# Patient Record
Sex: FEMALE | Race: WHITE | NOT HISPANIC OR LATINO | Employment: FULL TIME | URBAN - METROPOLITAN AREA
[De-identification: names, ages, dates, MRNs, and addresses within clinical notes are randomized per-mention and may not be internally consistent; named-entity substitution may affect disease eponyms.]

---

## 2018-02-04 ENCOUNTER — HOSPITAL ENCOUNTER (EMERGENCY)
Facility: HOSPITAL | Age: 48
Discharge: HOME | End: 2018-02-04
Attending: EMERGENCY MEDICINE | Admitting: EMERGENCY MEDICINE

## 2018-02-04 ENCOUNTER — APPOINTMENT (OUTPATIENT)
Dept: GENERAL RADIOLOGY | Facility: HOSPITAL | Age: 48
End: 2018-02-04

## 2018-02-04 VITALS
HEART RATE: 64 BPM | OXYGEN SATURATION: 97 % | WEIGHT: 200 LBS | TEMPERATURE: 98.6 F | DIASTOLIC BLOOD PRESSURE: 77 MMHG | BODY MASS INDEX: 32.14 KG/M2 | HEIGHT: 66 IN | SYSTOLIC BLOOD PRESSURE: 99 MMHG | RESPIRATION RATE: 16 BRPM

## 2018-02-04 DIAGNOSIS — R07.89 ATYPICAL CHEST PAIN: Primary | ICD-10-CM

## 2018-02-04 LAB
ALBUMIN SERPL-MCNC: 3.8 G/DL (ref 3.5–5.2)
ALBUMIN/GLOB SERPL: 1.1 G/DL
ALP SERPL-CCNC: 97 U/L (ref 39–117)
ALT SERPL W P-5'-P-CCNC: 28 U/L (ref 1–33)
ANION GAP SERPL CALCULATED.3IONS-SCNC: 12.2 MMOL/L
AST SERPL-CCNC: 24 U/L (ref 1–32)
BASOPHILS # BLD AUTO: 0.02 10*3/MM3 (ref 0–0.2)
BASOPHILS NFR BLD AUTO: 0.2 % (ref 0–1.5)
BILIRUB SERPL-MCNC: 0.3 MG/DL (ref 0.1–1.2)
BUN BLD-MCNC: 14 MG/DL (ref 6–20)
BUN/CREAT SERPL: 14.4 (ref 7–25)
CALCIUM SPEC-SCNC: 9.2 MG/DL (ref 8.6–10.5)
CHLORIDE SERPL-SCNC: 106 MMOL/L (ref 98–107)
CO2 SERPL-SCNC: 23.8 MMOL/L (ref 22–29)
CREAT BLD-MCNC: 0.97 MG/DL (ref 0.57–1)
DEPRECATED RDW RBC AUTO: 41.2 FL (ref 37–54)
EOSINOPHIL # BLD AUTO: 0.19 10*3/MM3 (ref 0–0.7)
EOSINOPHIL NFR BLD AUTO: 2.1 % (ref 0.3–6.2)
ERYTHROCYTE [DISTWIDTH] IN BLOOD BY AUTOMATED COUNT: 12.8 % (ref 11.7–13)
GFR SERPL CREATININE-BSD FRML MDRD: 61 ML/MIN/1.73
GLOBULIN UR ELPH-MCNC: 3.4 GM/DL
GLUCOSE BLD-MCNC: 82 MG/DL (ref 65–99)
HCT VFR BLD AUTO: 41.7 % (ref 35.6–45.5)
HGB BLD-MCNC: 14 G/DL (ref 11.9–15.5)
IMM GRANULOCYTES # BLD: 0.08 10*3/MM3 (ref 0–0.03)
IMM GRANULOCYTES NFR BLD: 0.9 % (ref 0–0.5)
LYMPHOCYTES # BLD AUTO: 2.06 10*3/MM3 (ref 0.9–4.8)
LYMPHOCYTES NFR BLD AUTO: 22.4 % (ref 19.6–45.3)
MCH RBC QN AUTO: 29.7 PG (ref 26.9–32)
MCHC RBC AUTO-ENTMCNC: 33.6 G/DL (ref 32.4–36.3)
MCV RBC AUTO: 88.5 FL (ref 80.5–98.2)
MONOCYTES # BLD AUTO: 0.92 10*3/MM3 (ref 0.2–1.2)
MONOCYTES NFR BLD AUTO: 10 % (ref 5–12)
NEUTROPHILS # BLD AUTO: 5.91 10*3/MM3 (ref 1.9–8.1)
NEUTROPHILS NFR BLD AUTO: 64.4 % (ref 42.7–76)
PLATELET # BLD AUTO: 196 10*3/MM3 (ref 140–500)
PMV BLD AUTO: 10.5 FL (ref 6–12)
POTASSIUM BLD-SCNC: 4.2 MMOL/L (ref 3.5–5.2)
PROT SERPL-MCNC: 7.2 G/DL (ref 6–8.5)
RBC # BLD AUTO: 4.71 10*6/MM3 (ref 3.9–5.2)
SODIUM BLD-SCNC: 142 MMOL/L (ref 136–145)
TROPONIN T SERPL-MCNC: <0.01 NG/ML (ref 0–0.03)
WBC NRBC COR # BLD: 9.18 10*3/MM3 (ref 4.5–10.7)

## 2018-02-04 PROCEDURE — 93005 ELECTROCARDIOGRAM TRACING: CPT

## 2018-02-04 PROCEDURE — 93010 ELECTROCARDIOGRAM REPORT: CPT | Performed by: INTERNAL MEDICINE

## 2018-02-04 PROCEDURE — 84484 ASSAY OF TROPONIN QUANT: CPT | Performed by: NURSE PRACTITIONER

## 2018-02-04 PROCEDURE — 99285 EMERGENCY DEPT VISIT HI MDM: CPT

## 2018-02-04 PROCEDURE — 80053 COMPREHEN METABOLIC PANEL: CPT | Performed by: NURSE PRACTITIONER

## 2018-02-04 PROCEDURE — 85025 COMPLETE CBC W/AUTO DIFF WBC: CPT | Performed by: NURSE PRACTITIONER

## 2018-02-04 PROCEDURE — 71046 X-RAY EXAM CHEST 2 VIEWS: CPT

## 2018-02-04 RX ORDER — NAPROXEN SODIUM 550 MG/1
550 TABLET ORAL 2 TIMES DAILY PRN
COMMUNITY
End: 2019-07-21

## 2018-02-04 RX ORDER — CITALOPRAM 40 MG/1
60 TABLET ORAL DAILY
COMMUNITY
End: 2022-03-23 | Stop reason: ALTCHOICE

## 2018-02-04 RX ORDER — LORAZEPAM 0.5 MG/1
0.5 TABLET ORAL EVERY 8 HOURS PRN
COMMUNITY
End: 2021-12-13 | Stop reason: HOSPADM

## 2018-02-04 RX ORDER — NITROGLYCERIN 0.4 MG/1
0.4 TABLET SUBLINGUAL ONCE
Status: COMPLETED | OUTPATIENT
Start: 2018-02-04 | End: 2018-02-04

## 2018-02-04 RX ORDER — SUMATRIPTAN 6 MG/.5ML
6 INJECTION, SOLUTION SUBCUTANEOUS ONCE AS NEEDED
COMMUNITY
End: 2021-12-13 | Stop reason: HOSPADM

## 2018-02-04 RX ADMIN — NITROGLYCERIN 0.4 MG: 0.4 TABLET SUBLINGUAL at 15:12

## 2018-02-04 NOTE — ED PROVIDER NOTES
"  EMERGENCY DEPARTMENT ENCOUNTER    CHIEF COMPLAINT  Chief Complaint: chest pain  History given by: patient, family  History limited by: N/A  Room Number: 03/03  PMD: SAMI Villegas      HPI:  Pt is a 47 y.o. female who has hx of atrial fibrillation (on 81mg ASA and metoprolol). She presents with nonradiating central chest pain (described as a \"kick\") that started today after she rolled over on the couch. It has no aggravating factors and no alleviating factors. She has also had anxiety. She denies nausea, vomiting, sweating, trouble breathing, palpitations, BLE swelling, fevers, chills, and abd pain. She reports that because her sx were similar to previous episodes of atrial fibrillation, she took metoprolol; however, it did not provide significant sx relief. Currently, sx have improved. Past Medical History of anxiety with panic attacks, atrial fibrillation (on metoprolol, 81mg ASA), and HTN.     Duration: started today  Timing: constant  Location: central chest  Radiation: none  Quality: \"kick\"  Intensity/Severity: moderate  Progression: improved  Associated Symptoms: anxiety  Aggravating Factors: none  Alleviating Factors: none  Previous Episodes: Pt notes that she had similar sx in the past due to atrial fibrillation.   Treatment before arrival: Pt reports that because her sx were similar to previous episodes of atrial fibrillation, she took metoprolol; however, it did not provide significant sx relief.    PAST MEDICAL HISTORY  Active Ambulatory Problems     Diagnosis Date Noted   • No Active Ambulatory Problems     Resolved Ambulatory Problems     Diagnosis Date Noted   • No Resolved Ambulatory Problems     Past Medical History:   Diagnosis Date   • Anxiety    • Atrial fibrillation    • Hypertension    • Panic attacks        PAST SURGICAL HISTORY  History reviewed. No pertinent surgical history.    FAMILY HISTORY  History reviewed. No pertinent family history.    SOCIAL HISTORY  Social History     Social " History   • Marital status:      Spouse name: N/A   • Number of children: N/A   • Years of education: N/A     Occupational History   • Not on file.     Social History Main Topics   • Smoking status: Not on file   • Smokeless tobacco: Not on file   • Alcohol use Not on file   • Drug use: Not on file   • Sexual activity: Not on file     Other Topics Concern   • Not on file     Social History Narrative   • No narrative on file         ALLERGIES  Review of patient's allergies indicates no known allergies.    REVIEW OF SYSTEMS  Review of Systems   Constitutional: Negative for chills and fever.   HENT: Negative for sore throat.    Respiratory: Negative for cough and shortness of breath.    Cardiovascular: Positive for chest pain (central chest pain).   Gastrointestinal: Negative for abdominal pain, diarrhea, nausea and vomiting.   Genitourinary: Negative for difficulty urinating and dysuria.   Musculoskeletal: Negative for back pain.   Skin: Negative for rash.   Neurological: Negative for weakness and headaches.   Psychiatric/Behavioral: The patient is nervous/anxious.        PHYSICAL EXAM  ED Triage Vitals   Temp Heart Rate Resp BP SpO2   02/04/18 1348 02/04/18 1347 02/04/18 1347 02/04/18 1347 02/04/18 1347   98.6 °F (37 °C) 65 16 106/72 98 % WNL       Physical Exam   Constitutional: She is oriented to person, place, and time and well-developed, well-nourished, and in no distress.   HENT:   Head: Normocephalic.   Mouth/Throat: Mucous membranes are normal.   Eyes: EOM are normal. No scleral icterus.   Neck: Normal range of motion.   Cardiovascular: Normal rate, regular rhythm and normal heart sounds.    Pulmonary/Chest: Effort normal and breath sounds normal. No respiratory distress. She has no wheezes. She exhibits tenderness (tenderness to mid chest that reproduces chest pain).   Abdominal: Soft. There is no tenderness. There is no rebound and no guarding.   Musculoskeletal: Normal range of motion. She exhibits  no edema (no pedal edema).   Neurological: She is alert and oriented to person, place, and time. She has normal motor skills and normal sensation.   Skin: Skin is warm and dry.   Psychiatric: Mood and affect normal.   Nursing note and vitals reviewed.      LAB RESULTS  Recent Results (from the past 24 hour(s))   Comprehensive Metabolic Panel    Collection Time: 02/04/18  3:00 PM   Result Value Ref Range    Glucose 82 65 - 99 mg/dL    BUN 14 6 - 20 mg/dL    Creatinine 0.97 0.57 - 1.00 mg/dL    Sodium 142 136 - 145 mmol/L    Potassium 4.2 3.5 - 5.2 mmol/L    Chloride 106 98 - 107 mmol/L    CO2 23.8 22.0 - 29.0 mmol/L    Calcium 9.2 8.6 - 10.5 mg/dL    Total Protein 7.2 6.0 - 8.5 g/dL    Albumin 3.80 3.50 - 5.20 g/dL    ALT (SGPT) 28 1 - 33 U/L    AST (SGOT) 24 1 - 32 U/L    Alkaline Phosphatase 97 39 - 117 U/L    Total Bilirubin 0.3 0.1 - 1.2 mg/dL    eGFR Non African Amer 61 >60 mL/min/1.73    Globulin 3.4 gm/dL    A/G Ratio 1.1 g/dL    BUN/Creatinine Ratio 14.4 7.0 - 25.0    Anion Gap 12.2 mmol/L   Troponin    Collection Time: 02/04/18  3:00 PM   Result Value Ref Range    Troponin T <0.010 0.000 - 0.030 ng/mL   CBC Auto Differential    Collection Time: 02/04/18  3:00 PM   Result Value Ref Range    WBC 9.18 4.50 - 10.70 10*3/mm3    RBC 4.71 3.90 - 5.20 10*6/mm3    Hemoglobin 14.0 11.9 - 15.5 g/dL    Hematocrit 41.7 35.6 - 45.5 %    MCV 88.5 80.5 - 98.2 fL    MCH 29.7 26.9 - 32.0 pg    MCHC 33.6 32.4 - 36.3 g/dL    RDW 12.8 11.7 - 13.0 %    RDW-SD 41.2 37.0 - 54.0 fl    MPV 10.5 6.0 - 12.0 fL    Platelets 196 140 - 500 10*3/mm3    Neutrophil % 64.4 42.7 - 76.0 %    Lymphocyte % 22.4 19.6 - 45.3 %    Monocyte % 10.0 5.0 - 12.0 %    Eosinophil % 2.1 0.3 - 6.2 %    Basophil % 0.2 0.0 - 1.5 %    Immature Grans % 0.9 (H) 0.0 - 0.5 %    Neutrophils, Absolute 5.91 1.90 - 8.10 10*3/mm3    Lymphocytes, Absolute 2.06 0.90 - 4.80 10*3/mm3    Monocytes, Absolute 0.92 0.20 - 1.20 10*3/mm3    Eosinophils, Absolute 0.19 0.00 - 0.70  10*3/mm3    Basophils, Absolute 0.02 0.00 - 0.20 10*3/mm3    Immature Grans, Absolute 0.08 (H) 0.00 - 0.03 10*3/mm3       I ordered the above labs and reviewed the results      RADIOLOGY         XR Chest 2 View (Preliminary result) Result time: 02/04/18 16:10:31     Preliminary result by Interface, Rad Results [place] In (02/04/18 16:10:31)     Impression:     Negative.        Narrative:     PA AND LATERAL CHEST X-RAY     HISTORY: Atrial fibrillation. Chest pain.     Chest x-ray consisting of PA and lateral views is provided.  Comparison  exams: None     FINDINGS: The cardiomediastinal silhouette is normal. The lungs are  clear. The costophrenic sulci are dry and the bones appear normal. There  is no pneumothorax.          I ordered the above noted radiological studies and reviewed the images on the PACS system.        EKG    EKG x2 were interpreted by Dr. Hairston. See Dr Hairston's note for EKG interpretation.         PROGRESS AND CONSULTS  3:01 PM- Reviewed pt's history and workup with Dr. Hairston.  At bedside evaluation, they agree with the plan of care.  3:08 PM- Per RN, pt's chest pain has worsened. Ordered repeat EKG and NTG sublingual.   3:40 PM- Pt's chest pain remained unchanged with NTG sublingual. Repeat EKG is not changed compared to initial EKG (see Dr Hairston's note for EKG interpretation).   3:52 PM- Rechecked pt. She is resting comfortably and is in no acute distress. Reviewed implications of results (including negative troponin, CXR findings (no acute process), EKG findings), diagnosis, meds, responsibility to follow up, warning signs and symptoms of possible worsening, potential complications and reasons to return to ER with patient.  Discussed all results and noted any abnormalities with patient.  Discussed the importance of and the absolute need to follow up with PMD and referred cardiologist closely for recheck of abnormalities and condition and for further management. Strict RTER warnings  "given.   Discussed plan for discharge, as there is no emergent indication for admission.  Pt is agreeable and understands need for follow up and repeat testing.  Pt is aware that discharge does not mean that nothing is wrong but it indicates no emergency is present.  Pt is discharged with instructions to follow up with primary care doctor to have their blood pressure rechecked.       DIAGNOSIS  Final diagnoses:   Atypical chest pain       FOLLOW UP   Yoanna AYAH Mabel, APRN  [address]  [facility] [id number] [phone]    Call in 1 day      Estefany Humphries MD  [address]  [address]  [facility] [address]  [phone]    Call in 1 day        COURSE & MEDICAL DECISION MAKING  Pertinent Labs and Imaging studies that were ordered and reviewed are noted above.  Results were reviewed/discussed with the patient and they were also made aware of online assess.   Pt also made aware that some labs, such as cultures, will not be resulted during ER visit and follow up with PMD is necessary.     MEDICATIONS GIVEN IN ER  Medications   nitroglycerin (NITROSTAT) SL tablet 0.4 mg (0.4 mg Sublingual Given 2/4/18 1512)       /84  Pulse 62  Temp 98.6 °F (37 °C)  Resp 16  Ht 167.6 cm (66\")  Wt 90.7 kg (200 lb)  SpO2 95%  BMI 32.28 kg/m2      I personally reviewed the past medical history, past surgical history, social history, family history, current medications and allergies as they appear in this chart.  The scribe's note accurately reflects the work and decisions made by me.     Documentation assistance provided by vicki Mooney for JET Cruz on 2/4/2018 at 4:11 PM. Information recorded by the scribe was done at my direction and has been verified and validated by me.         Asif Mooney  02/04/18 1632       SAMI Burton  02/05/18 0650    "

## 2018-02-04 NOTE — ED PROVIDER NOTES
Pt presents to the ED presents to the ED complaining of chest pain.  Pt states that today's chest pain was preceded by dizziness. She reports a history of afib but has not seen a cardiologist since 2016.  Pt states that her mom had a stent placed in her 40s.      On exam Pt is alert, awake, and in no distress.      I reviewed available workup.  Spoke with Pt about her available results.  Discussed the plan to evaluate further with chest XR.  Will discharge if XR is normal. Pt understands and agrees with the plan, all questions answered.    EKG           EKG time: 1437  Rhythm/Rate: Sinus, 64  P waves and MS: Normal  QRS, axis: LAD   ST and T waves: Non specific ST and T changes     Interpreted Contemporaneously by me, independently viewed  No priors for comparison.     EKG           EKG time: 1508  Rhythm/Rate: Sinus, 64  P waves and MS: Normal  QRS, axis: Normal   ST and T waves: Non specific ST and T wave changes.      Interpreted Contemporaneously by me, independently viewed  Unchanged compared to prior performed at 1437       I supervised care provided by the midlevel provider.    We have discussed this patient's history, physical exam, and treatment plan.   I have reviewed the note and personally saw and examined the patient and agree with the plan of care.    Documentation assistance provided by vicki Holm for Dr. Hairsotn.  Information recorded by the vicki was done at my direction and has been verified and validated by me.       Rosi Holm  02/04/18 1550       Rosi Holm  02/04/18 1621       Lai Hairston MD  02/04/18 5543

## 2018-02-04 NOTE — DISCHARGE INSTRUCTIONS
Continue current home medications  Follow up with pmd and cardiologist-call Monday for appointment  Return to er for fever, chills, vomiting, diarrhea, chest pain, shortness of air, or any new or worsening symptoms

## 2018-03-03 ENCOUNTER — HOSPITAL ENCOUNTER (EMERGENCY)
Facility: HOSPITAL | Age: 48
Discharge: HOME | End: 2018-03-03
Attending: EMERGENCY MEDICINE | Admitting: EMERGENCY MEDICINE

## 2018-03-03 ENCOUNTER — APPOINTMENT (OUTPATIENT)
Dept: GENERAL RADIOLOGY | Facility: HOSPITAL | Age: 48
End: 2018-03-03

## 2018-03-03 VITALS
HEART RATE: 78 BPM | RESPIRATION RATE: 18 BRPM | HEIGHT: 62 IN | DIASTOLIC BLOOD PRESSURE: 78 MMHG | WEIGHT: 163 LBS | TEMPERATURE: 98.2 F | OXYGEN SATURATION: 98 % | SYSTOLIC BLOOD PRESSURE: 114 MMHG | BODY MASS INDEX: 30 KG/M2

## 2018-03-03 DIAGNOSIS — S93.601A SPRAIN OF RIGHT FOOT, INITIAL ENCOUNTER: ICD-10-CM

## 2018-03-03 DIAGNOSIS — S93.401A SPRAIN OF RIGHT ANKLE, UNSPECIFIED LIGAMENT, INITIAL ENCOUNTER: Primary | ICD-10-CM

## 2018-03-03 PROCEDURE — 73630 X-RAY EXAM OF FOOT: CPT

## 2018-03-03 PROCEDURE — 99283 EMERGENCY DEPT VISIT LOW MDM: CPT

## 2018-03-03 PROCEDURE — 73610 X-RAY EXAM OF ANKLE: CPT

## 2018-03-03 RX ORDER — NAPROXEN 500 MG/1
500 TABLET ORAL 2 TIMES DAILY WITH MEALS
Qty: 20 TABLET | Refills: 0 | OUTPATIENT
Start: 2018-03-03 | End: 2019-07-21

## 2018-03-04 NOTE — ED NOTES
Pt c/o right anterior ankle pain that started apprpox 1hr ago. Pt states that she was walking her dog in the dark while texting when she missed at step and landed hard on her right foot. Minimal swelling noted. No deformity. Pedal pulse strong. Pt able to bear weight     Jayla Ann RN  03/03/18 4192

## 2018-03-04 NOTE — ED NOTES
Pt reports she was walking and texting and tripped and hurt her right ankle. Pt able to bear some wt     David Sapp RN  03/03/18 4565

## 2018-04-04 ENCOUNTER — HOSPITAL ENCOUNTER (EMERGENCY)
Facility: HOSPITAL | Age: 48
Discharge: HOME | End: 2018-04-04
Attending: EMERGENCY MEDICINE | Admitting: EMERGENCY MEDICINE

## 2018-04-04 VITALS
SYSTOLIC BLOOD PRESSURE: 126 MMHG | DIASTOLIC BLOOD PRESSURE: 72 MMHG | OXYGEN SATURATION: 98 % | HEART RATE: 87 BPM | RESPIRATION RATE: 18 BRPM | TEMPERATURE: 97.8 F | HEIGHT: 62 IN | BODY MASS INDEX: 30 KG/M2 | WEIGHT: 163 LBS

## 2018-04-04 DIAGNOSIS — M72.2 PLANTAR FASCIA SYNDROME: Primary | ICD-10-CM

## 2018-04-04 PROCEDURE — 99283 EMERGENCY DEPT VISIT LOW MDM: CPT

## 2018-04-04 RX ORDER — HYDROCODONE BITARTRATE AND ACETAMINOPHEN 5; 325 MG/1; MG/1
1 TABLET ORAL EVERY 6 HOURS PRN
Qty: 12 TABLET | Refills: 0 | Status: SHIPPED | OUTPATIENT
Start: 2018-04-04 | End: 2021-12-13 | Stop reason: HOSPADM

## 2018-04-04 RX ORDER — IBUPROFEN 800 MG/1
800 TABLET ORAL EVERY 8 HOURS PRN
Qty: 30 TABLET | Refills: 0 | OUTPATIENT
Start: 2018-04-04 | End: 2019-07-21

## 2018-04-04 NOTE — ED NOTES
Pt c/o left ankle pain that radiates up to her knee that started after she rolled her ankle 1 month ago. Pt states the pain has gotten worse.      Emma Bergman RN  04/04/18 6583

## 2018-04-04 NOTE — ED PROVIDER NOTES
EMERGENCY DEPARTMENT ENCOUNTER    CHIEF COMPLAINT  Chief Complaint: Ankle pain  History given by: pt  History limited by: nothing  Room Number: 50/50  PMD: Yoanna Monroe, SAMI      HPI:  Pt is a 47 y.o. female who presents complaining of L ankle pain onset 1 month ago. She feels pain today radiating up to her knee when she walks. Pt had a boot on her L leg, and thinks she twisted her ankle when she had this.     Duration:  1 month  Onset: gradual  Timing: constant  Location: L ankle  Radiation: L leg to knee  Quality: dull  Intensity/Severity: moderate  Progression: worsening  Associated Symptoms: none  Aggravating Factors: none  Alleviating Factors: none  Previous Episodes: Pt had an injury on her R leg prior to this one  Treatment before arrival: No tx before arrival noted.    PAST MEDICAL HISTORY  Active Ambulatory Problems     Diagnosis Date Noted   • No Active Ambulatory Problems     Resolved Ambulatory Problems     Diagnosis Date Noted   • No Resolved Ambulatory Problems     Past Medical History:   Diagnosis Date   • Anxiety    • Atrial fibrillation    • Hypertension    • Panic attacks        PAST SURGICAL HISTORY  History reviewed. No pertinent surgical history.    FAMILY HISTORY  History reviewed. No pertinent family history.    SOCIAL HISTORY  Social History     Social History   • Marital status:      Spouse name: N/A   • Number of children: N/A   • Years of education: N/A     Occupational History   • Not on file.     Social History Main Topics   • Smoking status: Never Smoker   • Smokeless tobacco: Not on file   • Alcohol use No   • Drug use: No   • Sexual activity: Defer     Other Topics Concern   • Not on file     Social History Narrative   • No narrative on file       ALLERGIES  Review of patient's allergies indicates no known allergies.    REVIEW OF SYSTEMS  Review of Systems   Constitutional: Negative for fever.   HENT: Negative for sore throat.    Eyes: Negative.    Respiratory: Negative  for cough and shortness of breath.    Cardiovascular: Negative for chest pain.   Gastrointestinal: Negative for abdominal pain, diarrhea and vomiting.   Genitourinary: Negative for dysuria.   Musculoskeletal: Positive for arthralgias (L foot). Negative for neck pain.   Skin: Negative for rash.   Allergic/Immunologic: Negative.    Neurological: Negative for weakness, numbness and headaches.   Hematological: Negative.    Psychiatric/Behavioral: Negative.    All other systems reviewed and are negative.      PHYSICAL EXAM  ED Triage Vitals   Temp Heart Rate Resp BP SpO2   04/04/18 1154 04/04/18 1154 04/04/18 1154 04/04/18 1221 04/04/18 1154   97.8 °F (36.6 °C) 88 16 115/78 98 %      Temp src Heart Rate Source Patient Position BP Location FiO2 (%)   04/04/18 1154 -- -- -- --   Tympanic           Physical Exam   Constitutional: She is oriented to person, place, and time and well-developed, well-nourished, and in no distress.   Eyes: EOM are normal.   Neck: Normal range of motion.   Cardiovascular: Normal rate and regular rhythm.    Pulmonary/Chest: Effort normal and breath sounds normal. No respiratory distress.   Musculoskeletal:   L foot- tenderness to palpation and plantar fascitis, pain with ROM, no swelling    Neurological: She is alert and oriented to person, place, and time. She has normal sensation and normal strength.   Skin: Skin is warm and dry.   Psychiatric: Affect normal.   Nursing note and vitals reviewed.      PROCEDURES  Procedures      PROGRESS AND CONSULTS  ED Course     1229- I advised pt that she has plantar fascitis. We will plan to provide her with steroids to decrease inflammation and provide her with a boot to decrease pressure on the area. We will have her f/u with a podiatrist. She will then be discharged. Pt understands and agrees with the plan, all questions answered.    MEDICAL DECISION MAKING  Results were reviewed/discussed with the patient and they were also made aware of online access. Pt  also made aware that some labs, such as cultures, will not be resulted during ER visit and follow up with PMD is necessary.     MDM       DIAGNOSIS  Final diagnoses:   Plantar fascia syndrome       DISPOSITION  DISCHARGE    Patient discharged in stable condition.    Reviewed implications of results, diagnosis, meds, responsibility to follow up, warning signs and symptoms of possible worsening, potential complications and reasons to return to ER, including worsening of sx.    Patient/Family voiced understanding of above instructions.    Discussed plan for discharge, as there is no emergent indication for admission. Patient referred to primary care provider for BP management due to today's BP. Pt/family is agreeable and understands need for follow up and repeat testing.  Pt is aware that discharge does not mean that nothing is wrong but it indicates no emergency is present that requires admission and they must continue care with follow-up as given below or physician of their choice.     FOLLOW-UP  Yoanna Monroe, APRN  [address] [facility] [address] [phone]    Call            Medication List      New Prescriptions    HYDROcodone-acetaminophen 5-325 MG per tablet  Commonly known as:  NORCO  Take 1 tablet by mouth Every 6 (Six) Hours As Needed for Severe Pain .     ibuprofen 800 MG tablet  Commonly known as:  ADVIL,MOTRIN  Take 1 tablet by mouth Every 8 (Eight) Hours As Needed for Mild Pain .              Latest Documented Vital Signs:  As of 12:42 PM  BP- 115/78 HR- 88 Temp- 97.8 °F (36.6 °C) (Tympanic) O2 sat- 98%    --  Documentation assistance provided by vicki Medellin for Dr. Smith.  Information recorded by the scrpoojae was done at my direction and has been verified and validated by me.     Chinmay Medellin  04/04/18 7624       Salvatore Smith MD  04/04/18 6485

## 2019-04-21 ENCOUNTER — HOSPITAL ENCOUNTER (EMERGENCY)
Facility: HOSPITAL | Age: 49
Discharge: HOME | End: 2019-04-21
Attending: EMERGENCY MEDICINE | Admitting: EMERGENCY MEDICINE

## 2019-04-21 VITALS
SYSTOLIC BLOOD PRESSURE: 108 MMHG | HEIGHT: 62 IN | OXYGEN SATURATION: 99 % | BODY MASS INDEX: 32.76 KG/M2 | WEIGHT: 178 LBS | DIASTOLIC BLOOD PRESSURE: 77 MMHG | RESPIRATION RATE: 16 BRPM | HEART RATE: 87 BPM | TEMPERATURE: 97.7 F

## 2019-04-21 DIAGNOSIS — K64.9 HEMORRHOIDS, UNSPECIFIED HEMORRHOID TYPE: ICD-10-CM

## 2019-04-21 DIAGNOSIS — K62.5 RECTAL BLEEDING: Primary | ICD-10-CM

## 2019-04-21 LAB
ABO GROUP BLD: NORMAL
ALBUMIN SERPL-MCNC: 4.5 G/DL (ref 3.5–5.2)
ALBUMIN/GLOB SERPL: 1.6 G/DL
ALP SERPL-CCNC: 110 U/L (ref 39–117)
ALT SERPL W P-5'-P-CCNC: 29 U/L (ref 1–33)
ANION GAP SERPL CALCULATED.3IONS-SCNC: 13.4 MMOL/L
AST SERPL-CCNC: 32 U/L (ref 1–32)
BASOPHILS # BLD AUTO: 0.04 10*3/MM3 (ref 0–0.2)
BASOPHILS NFR BLD AUTO: 0.6 % (ref 0–1.5)
BILIRUB SERPL-MCNC: 0.4 MG/DL (ref 0.2–1.2)
BLD GP AB SCN SERPL QL: NEGATIVE
BUN BLD-MCNC: 9 MG/DL (ref 6–20)
BUN/CREAT SERPL: 8.7 (ref 7–25)
CALCIUM SPEC-SCNC: 9.2 MG/DL (ref 8.6–10.5)
CHLORIDE SERPL-SCNC: 104 MMOL/L (ref 98–107)
CO2 SERPL-SCNC: 23.6 MMOL/L (ref 22–29)
CREAT BLD-MCNC: 1.03 MG/DL (ref 0.57–1)
D-LACTATE SERPL-SCNC: 1.3 MMOL/L (ref 0.5–2)
DEPRECATED RDW RBC AUTO: 38.6 FL (ref 37–54)
EOSINOPHIL # BLD AUTO: 0.24 10*3/MM3 (ref 0–0.4)
EOSINOPHIL NFR BLD AUTO: 3.3 % (ref 0.3–6.2)
ERYTHROCYTE [DISTWIDTH] IN BLOOD BY AUTOMATED COUNT: 12.6 % (ref 12.3–15.4)
GFR SERPL CREATININE-BSD FRML MDRD: 57 ML/MIN/1.73
GLOBULIN UR ELPH-MCNC: 2.9 GM/DL
GLUCOSE BLD-MCNC: 126 MG/DL (ref 65–99)
HCT VFR BLD AUTO: 43.5 % (ref 34–46.6)
HGB BLD-MCNC: 14.3 G/DL (ref 12–15.9)
IMM GRANULOCYTES # BLD AUTO: 0.04 10*3/MM3 (ref 0–0.05)
IMM GRANULOCYTES NFR BLD AUTO: 0.6 % (ref 0–0.5)
INR PPP: 0.98 (ref 0.9–1.1)
LYMPHOCYTES # BLD AUTO: 1.88 10*3/MM3 (ref 0.7–3.1)
LYMPHOCYTES NFR BLD AUTO: 25.9 % (ref 19.6–45.3)
MCH RBC QN AUTO: 28 PG (ref 26.6–33)
MCHC RBC AUTO-ENTMCNC: 32.9 G/DL (ref 31.5–35.7)
MCV RBC AUTO: 85.1 FL (ref 79–97)
MONOCYTES # BLD AUTO: 0.66 10*3/MM3 (ref 0.1–0.9)
MONOCYTES NFR BLD AUTO: 9.1 % (ref 5–12)
NEUTROPHILS # BLD AUTO: 4.4 10*3/MM3 (ref 1.7–7)
NEUTROPHILS NFR BLD AUTO: 60.5 % (ref 42.7–76)
NRBC BLD AUTO-RTO: 0 /100 WBC (ref 0–0.2)
PLATELET # BLD AUTO: 206 10*3/MM3 (ref 140–450)
PMV BLD AUTO: 10.4 FL (ref 6–12)
POTASSIUM BLD-SCNC: 4 MMOL/L (ref 3.5–5.2)
PROT SERPL-MCNC: 7.4 G/DL (ref 6–8.5)
PROTHROMBIN TIME: 12.7 SECONDS (ref 11.7–14.2)
RBC # BLD AUTO: 5.11 10*6/MM3 (ref 3.77–5.28)
RH BLD: POSITIVE
SODIUM BLD-SCNC: 141 MMOL/L (ref 136–145)
T&S EXPIRATION DATE: NORMAL
WBC NRBC COR # BLD: 7.26 10*3/MM3 (ref 3.4–10.8)

## 2019-04-21 PROCEDURE — 86901 BLOOD TYPING SEROLOGIC RH(D): CPT | Performed by: EMERGENCY MEDICINE

## 2019-04-21 PROCEDURE — 85025 COMPLETE CBC W/AUTO DIFF WBC: CPT | Performed by: EMERGENCY MEDICINE

## 2019-04-21 PROCEDURE — 80053 COMPREHEN METABOLIC PANEL: CPT | Performed by: EMERGENCY MEDICINE

## 2019-04-21 PROCEDURE — 83605 ASSAY OF LACTIC ACID: CPT | Performed by: EMERGENCY MEDICINE

## 2019-04-21 PROCEDURE — 99284 EMERGENCY DEPT VISIT MOD MDM: CPT

## 2019-04-21 PROCEDURE — 86850 RBC ANTIBODY SCREEN: CPT | Performed by: EMERGENCY MEDICINE

## 2019-04-21 PROCEDURE — 85610 PROTHROMBIN TIME: CPT | Performed by: EMERGENCY MEDICINE

## 2019-04-21 PROCEDURE — 86900 BLOOD TYPING SEROLOGIC ABO: CPT | Performed by: EMERGENCY MEDICINE

## 2019-04-21 RX ORDER — OMEPRAZOLE 20 MG/1
20 CAPSULE, DELAYED RELEASE ORAL DAILY
COMMUNITY
End: 2022-03-23 | Stop reason: SDUPTHER

## 2019-04-21 RX ORDER — ARIPIPRAZOLE 5 MG/1
5 TABLET ORAL DAILY
COMMUNITY
End: 2021-12-13 | Stop reason: HOSPADM

## 2019-04-21 RX ORDER — SODIUM CHLORIDE 0.9 % (FLUSH) 0.9 %
10 SYRINGE (ML) INJECTION AS NEEDED
Status: DISCONTINUED | OUTPATIENT
Start: 2019-04-21 | End: 2019-04-21 | Stop reason: HOSPADM

## 2019-07-21 ENCOUNTER — APPOINTMENT (OUTPATIENT)
Dept: GENERAL RADIOLOGY | Facility: HOSPITAL | Age: 49
End: 2019-07-21

## 2019-07-21 PROCEDURE — 73130 X-RAY EXAM OF HAND: CPT | Performed by: FAMILY MEDICINE

## 2019-07-21 PROCEDURE — 73110 X-RAY EXAM OF WRIST: CPT | Performed by: FAMILY MEDICINE

## 2019-09-02 ENCOUNTER — HOSPITAL ENCOUNTER (EMERGENCY)
Facility: HOSPITAL | Age: 49
Discharge: HOME | End: 2019-09-02
Attending: EMERGENCY MEDICINE | Admitting: EMERGENCY MEDICINE

## 2019-09-02 ENCOUNTER — APPOINTMENT (OUTPATIENT)
Dept: GENERAL RADIOLOGY | Facility: HOSPITAL | Age: 49
End: 2019-09-02

## 2019-09-02 VITALS
HEART RATE: 79 BPM | DIASTOLIC BLOOD PRESSURE: 87 MMHG | BODY MASS INDEX: 32.2 KG/M2 | RESPIRATION RATE: 16 BRPM | WEIGHT: 175 LBS | TEMPERATURE: 98.6 F | SYSTOLIC BLOOD PRESSURE: 125 MMHG | HEIGHT: 62 IN | OXYGEN SATURATION: 100 %

## 2019-09-02 DIAGNOSIS — S62.91XA CLOSED FRACTURE OF RIGHT HAND, INITIAL ENCOUNTER: Primary | ICD-10-CM

## 2019-09-02 PROCEDURE — 99283 EMERGENCY DEPT VISIT LOW MDM: CPT

## 2019-09-02 PROCEDURE — 73130 X-RAY EXAM OF HAND: CPT

## 2019-09-02 RX ORDER — OXYCODONE HYDROCHLORIDE AND ACETAMINOPHEN 5; 325 MG/1; MG/1
1-2 TABLET ORAL EVERY 6 HOURS PRN
Qty: 12 TABLET | Refills: 0 | Status: SHIPPED | OUTPATIENT
Start: 2019-09-02 | End: 2021-06-03 | Stop reason: HOSPADM

## 2019-09-02 NOTE — ED NOTES
"Pt states \"I hurt my wrist back in July and they told me that if it didn't get better in 2 weeks to get an MRI. It never got better but I didn't go to get an MRI. A week ago I hurt it again so now I think I need to get an MRI as soon as possible.\"     Rosi Viveros, RN  09/02/19 4191    "

## 2019-09-02 NOTE — ED PROVIDER NOTES
" EMERGENCY DEPARTMENT ENCOUNTER    Room Number:    Date of encounter:  2019  PCP: Yoanna Monroe APRN  Historian: Pt      HPI:  Chief Complaint: R wrist/hand pain  A complete HPI/ROS/PMH/PSH/SH/FH are unobtainable due to: n/a    Context: Marguerite Petersen is a 49 y.o. female who presents to the ED c/o constant \"moderate\" R wrist/hand pain for the past 6 weeks.  Pt states that she fell and landed on her R wrist 6 weeks ago and that it has been hurting ever since.  She states that she fell on it again recently which made the pain worse.  She had Xrays done following both falls which showed no fractures.  She came to ED today because her pain has not improved and she would like an MRI.  The pain is worse with movement and she has been taking Tylenol and Naproxen for the pain with no relief.       PAST MEDICAL HISTORY  Active Ambulatory Problems     Diagnosis Date Noted   • No Active Ambulatory Problems     Resolved Ambulatory Problems     Diagnosis Date Noted   • No Resolved Ambulatory Problems     Past Medical History:   Diagnosis Date   • Anxiety    • Atrial fibrillation (CMS/HCC)    • Hypertension    • Panic attacks          PAST SURGICAL HISTORY  Past Surgical History:   Procedure Laterality Date   •  SECTION     • CHOLECYSTECTOMY     • HYSTERECTOMY           FAMILY HISTORY  History reviewed. No pertinent family history.      SOCIAL HISTORY  Social History     Socioeconomic History   • Marital status:      Spouse name: Not on file   • Number of children: Not on file   • Years of education: Not on file   • Highest education level: Not on file   Tobacco Use   • Smoking status: Never Smoker   • Smokeless tobacco: Never Used   Substance and Sexual Activity   • Alcohol use: No   • Drug use: No   • Sexual activity: Defer         ALLERGIES  Patient has no known allergies.        REVIEW OF SYSTEMS  Review of Systems   Constitutional: Negative for fever.   Respiratory: Negative for shortness of breath. "    Cardiovascular: Negative for chest pain.   Musculoskeletal: Positive for arthralgias ( R wrist/hand).        All systems reviewed and negative except for those discussed in HPI.       PHYSICAL EXAM    I have reviewed the triage vital signs and nursing notes.    ED Triage Vitals [09/02/19 1651]   Temp Heart Rate Resp BP SpO2   97.4 °F (36.3 °C) 83 16 -- 99 %      Temp src Heart Rate Source Patient Position BP Location FiO2 (%)   Tympanic -- -- -- --       Physical Exam  GENERAL: not distressed  HENT: nares patent  EYES: no scleral icterus  CV: regular rhythm, regular rate  RESPIRATORY: normal effort  ABDOMEN: soft  MUSCULOSKELETAL: no deformity, tenderness to palpation of mild palm  NEURO: alert, moves all extremities, follows commands  SKIN: warm, dry      RADIOLOGY  No Radiology Exams Resulted Within Past 24 Hours    I ordered the above noted radiological studies. Reviewed by me and discussed with radiologist.  See dictation for official radiology interpretation.      PROCEDURES    Splint - Cast - Strapping  Date/Time: 9/2/2019 7:32 PM  Performed by: Joseph Berger MD  Authorized by: Joseph Berger MD     Pre-procedure details:     Sensation:  Normal  Procedure details:     Laterality:  Right    Location:  Hand    Hand:  R hand    Splint type:  Ulnar gutter    Supplies:  Ortho-Glass  Post-procedure details:     Pain:  Unchanged    Sensation:  Normal    Patient tolerance of procedure:  Tolerated well, no immediate complications          MEDICATIONS GIVEN IN ER    Medications - No data to display      PROGRESS, DATA ANALYSIS, CONSULTS, AND MEDICAL DECISION MAKING    All labs have been independently reviewed by me.  All radiology studies have been reviewed by me and discussed with radiologist dictating the report.   EKG's independently viewed and interpreted by me.  Discussion below represents my analysis of pertinent findings related to patient's condition, differential diagnosis, treatment plan and final  disposition.    1915: Rechecked the patient who is resting comfortably and in NAD. Patient is stable.  Informed the patient of imaging which showed possible healing from a recent fracture. Discussed the plan for discharge with instructions to f/u with hand specialist for further evaluation and management. Strict RTER warnings given. Pt understands and agrees with the plan, all questions answered.    ED Course as of Sep 02 1931   Mon Sep 02, 2019   1929 I discussed the x-ray results with Dr. Good.  I have also reviewed the images.  There is an abnormal density at the base of the fifth metacarpal which could represent healing from a prior occult fracture.  Dr. Good he does not note this density on the x-rays from 7/21.  I will then treat this as a possible subacute occult fracture.  I will patient place patient in a Ortho-Glass splint.  [DB]      ED Course User Index  [DB] Joseph Berger MD       AS OF 7:31 PM VITALS:    BP - 119/84  HR - 83  TEMP - 97.4 °F (36.3 °C) (Tympanic)  02 SATS - 99%        DIAGNOSIS  Final diagnoses:   Closed fracture of right hand, initial encounter         DISPOSITION  DISCHARGE    Patient discharged in stable condition.    Reviewed implications of results, diagnosis, meds, responsibility to follow up, warning signs and symptoms of possible worsening, potential complications and reasons to return to ER.    Patient/Family voiced understanding of above instructions.    Discussed plan for discharge, as there is no emergent indication for admission. Patient referred to primary care provider for BP management due to today's BP. Pt/family is agreeable and understands need for follow up and repeat testing.  Pt is aware that discharge does not mean that nothing is wrong but it indicates no emergency is present that requires admission and they must continue care with follow-up as given below or physician of their choice.     FOLLOW-UP  Ana M Mckinney MD  [facility]  [place] [address]  [phone]      Call for Appointment         Medication List      New Prescriptions    oxyCODONE-acetaminophen 5-325 MG per tablet  Commonly known as:  PERCOCET  Take 1-2 tablets by mouth Every 6 (Six) Hours As Needed (pain).              --  Documentation assistance provided by vicki Valente for Dr. Celine MD.  Information recorded by the scribe was done at my direction and has been verified and validated by me.       Simone Valente  09/02/19 1932       Joseph Berger MD  09/02/19 1932

## 2020-09-27 ENCOUNTER — HOSPITAL ENCOUNTER (EMERGENCY)
Facility: HOSPITAL | Age: 50
Discharge: LEFT BEFORE DISCHARGE | End: 2020-09-27

## 2020-09-27 VITALS — TEMPERATURE: 98.5 F

## 2020-11-03 ENCOUNTER — APPOINTMENT (OUTPATIENT)
Dept: GENERAL RADIOLOGY | Facility: HOSPITAL | Age: 50
End: 2020-11-03

## 2020-11-03 ENCOUNTER — HOSPITAL ENCOUNTER (EMERGENCY)
Facility: HOSPITAL | Age: 50
Discharge: HOME | End: 2020-11-03
Attending: EMERGENCY MEDICINE | Admitting: EMERGENCY MEDICINE

## 2020-11-03 VITALS
OXYGEN SATURATION: 97 % | HEART RATE: 80 BPM | BODY MASS INDEX: 32.01 KG/M2 | TEMPERATURE: 98.7 F | SYSTOLIC BLOOD PRESSURE: 108 MMHG | DIASTOLIC BLOOD PRESSURE: 76 MMHG | HEIGHT: 62 IN | RESPIRATION RATE: 16 BRPM

## 2020-11-03 DIAGNOSIS — S90.32XA CONTUSION OF LEFT FOOT, INITIAL ENCOUNTER: ICD-10-CM

## 2020-11-03 DIAGNOSIS — S80.12XA CONTUSION OF LEFT LOWER LEG, INITIAL ENCOUNTER: Primary | ICD-10-CM

## 2020-11-03 PROCEDURE — 73630 X-RAY EXAM OF FOOT: CPT

## 2020-11-03 PROCEDURE — 73590 X-RAY EXAM OF LOWER LEG: CPT

## 2020-11-03 PROCEDURE — 73610 X-RAY EXAM OF ANKLE: CPT

## 2020-11-03 PROCEDURE — 99283 EMERGENCY DEPT VISIT LOW MDM: CPT

## 2020-11-03 NOTE — ED PROVIDER NOTES
Pt presents to the ED c/o  left foot and ankle pain after fall down 7 carpeted steps today.  She denies head trauma or loss of consciousness.  The pain is currently mild, worse when she tries to put weight on it.     On exam,   Awake and alert, no acute distress.  Left ankle:  No proximal fibula tenderness.  Intact Crespo's test.  Leg compartments soft and compressible.   Mild medial malleolar tenderness.  No lateral malleolar tenderness.   No fifth metatarsal tenderness.  No dorsal foot tenderness.  2+ DP/PT pulses  5/5 strength to dorsiflexion and plantarflexion.  SILT to sural, saphenous, deep peroneal and superficial peroneal nerves.       Plan: Obtain plain films of the left ankle and tib-fib.      I wore a surgical mask, gloves, and eye protection during this patient encounter.  Patient also wearing a surgical mask.  Hand hygeine performed before and after seeing the patient.     Attestation:  The LYNNE and I have discussed this patient's history, physical exam, and treatment plan.  I have reviewed the documentation and personally had a face to face interaction with the patient. I affirm the documentation and agree with the treatment and plan.  The attached note describes my personal findings.            Teofilo Bundy MD  11/03/20 0910

## 2020-11-03 NOTE — ED NOTES
Pt presents to ED via EMS from home. Pt reports she fell down 7 stairs this morning. Pt denies head injury, LOC, or blood thinner. Pt complains of L ankle pain. Pt is A&OX4, able to ambulate with EMS, and in a mask.      Josh Berg, RN  11/03/20 0937

## 2020-11-03 NOTE — ED PROVIDER NOTES
EMERGENCY DEPARTMENT ENCOUNTER    Room Number:    Date seen:  11/3/2020  Time seen: 09:31 EST  PCP: Marielena Crews MD  Historian: patient      HPI:  Chief Complaint: left leg injury    A complete HPI/ROS/PMH/PSH/SH/FH are unobtainable due to: none    Context: Marguerite Petersen is a 50 y.o. female who presents to the ED for evaluation of pain in the left lower leg ankle and foot that began just prior to arrival and is constant moderate and made worse by weightbearing and range of motion and made better by resting and elevating.  She states it started when she fell on her carpeted stairs at home.  She states her foot slipped on the edge of one of them causing her to fall down them.  She denies any head injury neck or back pain, any other extremity pain or injury.  She is not anticoagulated.        PAST MEDICAL HISTORY  Active Ambulatory Problems     Diagnosis Date Noted   • No Active Ambulatory Problems     Resolved Ambulatory Problems     Diagnosis Date Noted   • No Resolved Ambulatory Problems     Past Medical History:   Diagnosis Date   • Anxiety    • Atrial fibrillation (CMS/HCC)    • Hypertension    • Panic attacks          PAST SURGICAL HISTORY  Past Surgical History:   Procedure Laterality Date   •  SECTION     • CHOLECYSTECTOMY     • HYSTERECTOMY           FAMILY HISTORY  No family history on file.      SOCIAL HISTORY  Social History     Socioeconomic History   • Marital status:      Spouse name: Not on file   • Number of children: Not on file   • Years of education: Not on file   • Highest education level: Not on file   Tobacco Use   • Smoking status: Never Smoker   • Smokeless tobacco: Never Used   Substance and Sexual Activity   • Alcohol use: No   • Drug use: No   • Sexual activity: Defer         ALLERGIES  Patient has no known allergies.        REVIEW OF SYSTEMS  Review of Systems     All systems reviewed and negative except for those discussed in HPI.       PHYSICAL EXAM  ED Triage  Vitals   Temp Heart Rate Resp BP SpO2   11/03/20 0909 11/03/20 0907 11/03/20 0907 11/03/20 0907 11/03/20 0907   98.7 °F (37.1 °C) 87 16 102/69 100 %      Temp src Heart Rate Source Patient Position BP Location FiO2 (%)   -- 11/03/20 0907 11/03/20 0907 11/03/20 0907 --    Monitor Sitting Right arm          GENERAL: not distressed  HENT: atraumatic  EYES: no scleral icterus  CV:  regular rate  RESPIRATORY: normal effort  ABDOMEN: Nondistended  MUSCULOSKELETAL: no deformity.  There is a superficial abrasion and with some linear ecchymosis over the mid left shin as well as superficial abrasion with some mild ecchymosis over the dorsum of the left foot along the first metatarsal.  There are some localized tenderness to these areas as well as some mild adjacent edema.  She does have full range of motion of the left lower extremity, sensation is intact distally, cap refill is brisk, DP PT pulses are 2+.  NEURO: alert, moves all extremities, follows commands  SKIN: warm, dry    Vital signs and nursing notes reviewed.    RADIOLOGY  Xr Tibia Fibula 2 View Left    Result Date: 11/3/2020  Narrative: XR TIBIA FIBULA 2 VW LEFT-, XR ANKLE 3+ VW LEFT-, XR FOOT 3+ VW LEFT-  Clinical: Fell down stairs, injured left leg  FINDINGS: There is patellofemoral joint narrowing. The medial and lateral compartments the left knee are preserved. No knee effusion. Proximally mid left tibia and fibula satisfactory appearance. The tibiotalar alignment is appropriate. No osteochondral defect.  There is a 3 mm calcific density projecting off the tip of the medial malleolus, there is a likely small cortical defect at this location suggesting tiny chip/avulsion fracture. There is however no overlying soft tissue swelling suggesting that this could be old.  The distal fibula is normal without fracture. The subtalar articulations are preserved. There is an inferior calcaneal spur. There is first metatarsal phalangeal joint narrowing. Minimal bone  hypertrophy demonstrated along the base of the fourth and fifth metatarsals. The forefoot is otherwise satisfactory in appearance. No indication of foot fracture or dislocation. The soft tissues have a satisfactory appearance, no soft tissue gas to radiopaque foreign body seen.  CONCLUSION: There appears to be a tiny chip or avulsion fracture off the tip of the medial malleolus, there is however no overlying soft tissue swelling at this location. Advise clinical correlation. No other acute osseous or articular abnormality is demonstrated. There is joint degeneration about the left knee.  This report was finalized on 11/3/2020 10:27 AM by Dr. Bashir Del Rosario M.D.      Xr Ankle 3+ View Left    Result Date: 11/3/2020  Narrative: XR TIBIA FIBULA 2 VW LEFT-, XR ANKLE 3+ VW LEFT-, XR FOOT 3+ VW LEFT-  Clinical: Fell down stairs, injured left leg  FINDINGS: There is patellofemoral joint narrowing. The medial and lateral compartments the left knee are preserved. No knee effusion. Proximally mid left tibia and fibula satisfactory appearance. The tibiotalar alignment is appropriate. No osteochondral defect.  There is a 3 mm calcific density projecting off the tip of the medial malleolus, there is a likely small cortical defect at this location suggesting tiny chip/avulsion fracture. There is however no overlying soft tissue swelling suggesting that this could be old.  The distal fibula is normal without fracture. The subtalar articulations are preserved. There is an inferior calcaneal spur. There is first metatarsal phalangeal joint narrowing. Minimal bone hypertrophy demonstrated along the base of the fourth and fifth metatarsals. The forefoot is otherwise satisfactory in appearance. No indication of foot fracture or dislocation. The soft tissues have a satisfactory appearance, no soft tissue gas to radiopaque foreign body seen.  CONCLUSION: There appears to be a tiny chip or avulsion fracture off the tip of the medial  malleolus, there is however no overlying soft tissue swelling at this location. Advise clinical correlation. No other acute osseous or articular abnormality is demonstrated. There is joint degeneration about the left knee.  This report was finalized on 11/3/2020 10:27 AM by Dr. Bashir Del Rosario M.D.      Xr Foot 3+ View Left    Result Date: 11/3/2020  Narrative: XR TIBIA FIBULA 2 VW LEFT-, XR ANKLE 3+ VW LEFT-, XR FOOT 3+ VW LEFT-  Clinical: Fell down stairs, injured left leg  FINDINGS: There is patellofemoral joint narrowing. The medial and lateral compartments the left knee are preserved. No knee effusion. Proximally mid left tibia and fibula satisfactory appearance. The tibiotalar alignment is appropriate. No osteochondral defect.  There is a 3 mm calcific density projecting off the tip of the medial malleolus, there is a likely small cortical defect at this location suggesting tiny chip/avulsion fracture. There is however no overlying soft tissue swelling suggesting that this could be old.  The distal fibula is normal without fracture. The subtalar articulations are preserved. There is an inferior calcaneal spur. There is first metatarsal phalangeal joint narrowing. Minimal bone hypertrophy demonstrated along the base of the fourth and fifth metatarsals. The forefoot is otherwise satisfactory in appearance. No indication of foot fracture or dislocation. The soft tissues have a satisfactory appearance, no soft tissue gas to radiopaque foreign body seen.  CONCLUSION: There appears to be a tiny chip or avulsion fracture off the tip of the medial malleolus, there is however no overlying soft tissue swelling at this location. Advise clinical correlation. No other acute osseous or articular abnormality is demonstrated. There is joint degeneration about the left knee.  This report was finalized on 11/3/2020 10:27 AM by Dr. Bashir Del Rosario M.D.        I ordered the above noted radiological studies. Reviewed by me and  discussed with radiologist.  See dictation for official radiology interpretation.    PROCEDURES  Procedures        MEDICATIONS GIVEN IN ER  Medications - No data to display          PROGRESS AND CONSULTS    DDX includes but not limited to sprain, contusion, fracture      Reassessed the patient, she appears comfortable.  She has no tenderness, edema, or ecchymosis over the medial malleolus to suggest acute avulsion fracture.  She declines crutches, feels they would actually make her more likely to fall.  She has a lot of pain with ambulation, requests a cam walker boot which we will apply prior to discharge.  Patient she has previously seen an orthopedist but cannot remember who.  I will give her the on-call today in case she needs the follow-up information, I have recommended she follow-up with Ortho in 1 week if not significantly improved.  It appears her injuries today are primarily contusion and hopefully she will have a quick and uneventful course of healing.  She can take OTCs at home as needed for pain, rest ice and elevate when possible when needed for pain and swelling.  She is agreeable with the plan and stable for discharge.     My interpretation of the left tib-fib and left foot show no acute fracture.  Small chip off the medial malleolus which is clinically inconsistent with acute fracture.    Medical chart reviewed. ER visit on 9/2/2019 for an injury to the right wrist and hand.  X-ray showed concern for possible subacute fracture and splint was applied and she was given follow-up with orthopedic hand.     Reviewed pt's history and workup with Dr. Bundy.  After a bedside evaluation; they agree with the plan of care      Patient was placed in face mask in first look. Patient was wearing facemask each time I entered the room and throughout our encounter. I wore protective equipment throughout this patient encounter including a face mask, eye shield and gloves. Hand hygiene was performed before donning  protective equipment and after removal when leaving the room.        DIAGNOSIS  Final diagnoses:   Contusion of left lower leg, initial encounter   Contusion of left foot, initial encounter               Latest Documented Vital Signs:  As of 09:31 EST  BP- 102/69 HR- 87 Temp- 98.7 °F (37.1 °C) O2 sat- 100%       Jeny Salmon PA  11/04/20 [id number]

## 2020-11-03 NOTE — DISCHARGE INSTRUCTIONS
Rest ice elevate, activities as tolerated.  He can take OTC medications as needed for pain such as Tylenol ibuprofen or Aleve.  Return to the emergency department as needed.

## 2021-03-24 ENCOUNTER — BULK ORDERING (OUTPATIENT)
Dept: CASE MANAGEMENT | Facility: OTHER | Age: 51
End: 2021-03-24

## 2021-03-24 DIAGNOSIS — Z23 IMMUNIZATION DUE: ICD-10-CM

## 2021-04-18 ENCOUNTER — APPOINTMENT (OUTPATIENT)
Dept: GENERAL RADIOLOGY | Facility: HOSPITAL | Age: 51
End: 2021-04-18

## 2021-04-18 ENCOUNTER — HOSPITAL ENCOUNTER (EMERGENCY)
Facility: HOSPITAL | Age: 51
Discharge: HOME | End: 2021-04-18
Attending: EMERGENCY MEDICINE | Admitting: EMERGENCY MEDICINE

## 2021-04-18 ENCOUNTER — APPOINTMENT (OUTPATIENT)
Dept: CT IMAGING | Facility: HOSPITAL | Age: 51
End: 2021-04-18

## 2021-04-18 VITALS
SYSTOLIC BLOOD PRESSURE: 113 MMHG | HEART RATE: 92 BPM | BODY MASS INDEX: 28.52 KG/M2 | OXYGEN SATURATION: 97 % | WEIGHT: 155 LBS | HEIGHT: 62 IN | DIASTOLIC BLOOD PRESSURE: 87 MMHG | RESPIRATION RATE: 16 BRPM | TEMPERATURE: 97.4 F

## 2021-04-18 DIAGNOSIS — S40.012A CONTUSION OF LEFT SHOULDER, INITIAL ENCOUNTER: ICD-10-CM

## 2021-04-18 DIAGNOSIS — S30.1XXA CONTUSION OF ABDOMINAL WALL, INITIAL ENCOUNTER: ICD-10-CM

## 2021-04-18 DIAGNOSIS — S00.93XA CONTUSION OF HEAD, UNSPECIFIED PART OF HEAD, INITIAL ENCOUNTER: ICD-10-CM

## 2021-04-18 DIAGNOSIS — S36.428A: ICD-10-CM

## 2021-04-18 DIAGNOSIS — S16.1XXA STRAIN OF NECK MUSCLE, INITIAL ENCOUNTER: ICD-10-CM
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
## 2021-04-18 LAB
ALBUMIN SERPL-MCNC: 4.3 G/DL (ref 3.5–5.2)
ALBUMIN/GLOB SERPL: 1.4 G/DL
ALP SERPL-CCNC: 144 U/L (ref 39–117)
ALT SERPL W P-5'-P-CCNC: 16 U/L (ref 1–33)
ANION GAP SERPL CALCULATED.3IONS-SCNC: 12.5 MMOL/L (ref 5–15)
AST SERPL-CCNC: 20 U/L (ref 1–32)
BACTERIA UR QL AUTO: ABNORMAL /HPF
BASOPHILS # BLD AUTO: 0.04 10*3/MM3 (ref 0–0.2)
BASOPHILS NFR BLD AUTO: 0.5 % (ref 0–1.5)
BILIRUB SERPL-MCNC: 0.3 MG/DL (ref 0–1.2)
BILIRUB UR QL STRIP: NEGATIVE
BUN SERPL-MCNC: 13 MG/DL (ref 6–20)
BUN/CREAT SERPL: 14.9 (ref 7–25)
CALCIUM SPEC-SCNC: 9.7 MG/DL (ref 8.6–10.5)
CHLORIDE SERPL-SCNC: 101 MMOL/L (ref 98–107)
CLARITY UR: ABNORMAL
CO2 SERPL-SCNC: 21.5 MMOL/L (ref 22–29)
COLOR UR: YELLOW
CREAT SERPL-MCNC: 0.87 MG/DL (ref 0.57–1)
DEPRECATED RDW RBC AUTO: 39.6 FL (ref 37–54)
EOSINOPHIL # BLD AUTO: 0.19 10*3/MM3 (ref 0–0.4)
EOSINOPHIL NFR BLD AUTO: 2.1 % (ref 0.3–6.2)
ERYTHROCYTE [DISTWIDTH] IN BLOOD BY AUTOMATED COUNT: 13.1 % (ref 12.3–15.4)
GFR SERPL CREATININE-BSD FRML MDRD: 68 ML/MIN/1.73
GLOBULIN UR ELPH-MCNC: 3 GM/DL
GLUCOSE SERPL-MCNC: 225 MG/DL (ref 65–99)
GLUCOSE UR STRIP-MCNC: NEGATIVE MG/DL
HCT VFR BLD AUTO: 44.4 % (ref 34–46.6)
HGB BLD-MCNC: 14.5 G/DL (ref 12–15.9)
HGB UR QL STRIP.AUTO: NEGATIVE
HYALINE CASTS UR QL AUTO: ABNORMAL /LPF
IMM GRANULOCYTES # BLD AUTO: 0.06 10*3/MM3 (ref 0–0.05)
IMM GRANULOCYTES NFR BLD AUTO: 0.7 % (ref 0–0.5)
INR PPP: 0.99 (ref 0.9–1.1)
KETONES UR QL STRIP: NEGATIVE
LEUKOCYTE ESTERASE UR QL STRIP.AUTO: ABNORMAL
LIPASE SERPL-CCNC: 74 U/L (ref 13–60)
LYMPHOCYTES # BLD AUTO: 1.46 10*3/MM3 (ref 0.7–3.1)
LYMPHOCYTES NFR BLD AUTO: 16.5 % (ref 19.6–45.3)
MCH RBC QN AUTO: 27.3 PG (ref 26.6–33)
MCHC RBC AUTO-ENTMCNC: 32.7 G/DL (ref 31.5–35.7)
MCV RBC AUTO: 83.6 FL (ref 79–97)
MONOCYTES # BLD AUTO: 0.52 10*3/MM3 (ref 0.1–0.9)
MONOCYTES NFR BLD AUTO: 5.9 % (ref 5–12)
NEUTROPHILS NFR BLD AUTO: 6.57 10*3/MM3 (ref 1.7–7)
NEUTROPHILS NFR BLD AUTO: 74.3 % (ref 42.7–76)
NITRITE UR QL STRIP: NEGATIVE
NRBC BLD AUTO-RTO: 0 /100 WBC (ref 0–0.2)
PH UR STRIP.AUTO: 6 [PH] (ref 5–8)
PLATELET # BLD AUTO: 193 10*3/MM3 (ref 140–450)
PMV BLD AUTO: 10 FL (ref 6–12)
POTASSIUM SERPL-SCNC: 3.6 MMOL/L (ref 3.5–5.2)
PROT SERPL-MCNC: 7.3 G/DL (ref 6–8.5)
PROT UR QL STRIP: NEGATIVE
PROTHROMBIN TIME: 12.9 SECONDS (ref 11.7–14.2)
RBC # BLD AUTO: 5.31 10*6/MM3 (ref 3.77–5.28)
RBC # UR: ABNORMAL /HPF
REF LAB TEST METHOD: ABNORMAL
SODIUM SERPL-SCNC: 135 MMOL/L (ref 136–145)
SP GR UR STRIP: 1.01 (ref 1–1.03)
SQUAMOUS #/AREA URNS HPF: ABNORMAL /HPF
UROBILINOGEN UR QL STRIP: ABNORMAL
WBC # BLD AUTO: 8.84 10*3/MM3 (ref 3.4–10.8)
WBC UR QL AUTO: ABNORMAL /HPF

## 2021-04-18 PROCEDURE — 83690 ASSAY OF LIPASE: CPT | Performed by: EMERGENCY MEDICINE

## 2021-04-18 PROCEDURE — 85610 PROTHROMBIN TIME: CPT | Performed by: EMERGENCY MEDICINE

## 2021-04-18 PROCEDURE — 70450 CT HEAD/BRAIN W/O DYE: CPT

## 2021-04-18 PROCEDURE — 81001 URINALYSIS AUTO W/SCOPE: CPT | Performed by: EMERGENCY MEDICINE

## 2021-04-18 PROCEDURE — 73030 X-RAY EXAM OF SHOULDER: CPT

## 2021-04-18 PROCEDURE — 25010000002 IOPAMIDOL 61 % SOLUTION: Performed by: EMERGENCY MEDICINE

## 2021-04-18 PROCEDURE — 71260 CT THORAX DX C+: CPT

## 2021-04-18 PROCEDURE — 80053 COMPREHEN METABOLIC PANEL: CPT | Performed by: EMERGENCY MEDICINE

## 2021-04-18 PROCEDURE — 99283 EMERGENCY DEPT VISIT LOW MDM: CPT

## 2021-04-18 PROCEDURE — 96374 THER/PROPH/DIAG INJ IV PUSH: CPT

## 2021-04-18 PROCEDURE — 25010000002 KETOROLAC TROMETHAMINE PER 15 MG: Performed by: EMERGENCY MEDICINE

## 2021-04-18 PROCEDURE — 85025 COMPLETE CBC W/AUTO DIFF WBC: CPT | Performed by: EMERGENCY MEDICINE

## 2021-04-18 PROCEDURE — 73610 X-RAY EXAM OF ANKLE: CPT

## 2021-04-18 PROCEDURE — 74177 CT ABD & PELVIS W/CONTRAST: CPT

## 2021-04-18 PROCEDURE — 72125 CT NECK SPINE W/O DYE: CPT

## 2021-04-18 RX ORDER — HYDROCODONE BITARTRATE AND ACETAMINOPHEN 5; 325 MG/1; MG/1
1 TABLET ORAL 4 TIMES DAILY PRN
Qty: 10 TABLET | Refills: 0 | Status: SHIPPED | OUTPATIENT
Start: 2021-04-18 | End: 2021-06-03 | Stop reason: HOSPADM

## 2021-04-18 RX ORDER — KETOROLAC TROMETHAMINE 15 MG/ML
15 INJECTION, SOLUTION INTRAMUSCULAR; INTRAVENOUS ONCE
Status: COMPLETED | OUTPATIENT
Start: 2021-04-18 | End: 2021-04-18

## 2021-04-18 RX ADMIN — SODIUM CHLORIDE 500 ML: 9 INJECTION, SOLUTION INTRAVENOUS at 09:29

## 2021-04-18 RX ADMIN — IOPAMIDOL 100 ML: 612 INJECTION, SOLUTION INTRAVENOUS at 10:38

## 2021-04-18 RX ADMIN — KETOROLAC TROMETHAMINE 15 MG: 15 INJECTION, SOLUTION INTRAMUSCULAR; INTRAVENOUS at 09:29
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
## 2021-05-22 ENCOUNTER — HOSPITAL ENCOUNTER (EMERGENCY)
Facility: HOSPITAL | Age: 51
Discharge: HOME | End: 2021-05-23
Attending: EMERGENCY MEDICINE | Admitting: EMERGENCY MEDICINE

## 2021-05-22 ENCOUNTER — APPOINTMENT (OUTPATIENT)
Dept: CT IMAGING | Facility: HOSPITAL | Age: 51
End: 2021-05-22

## 2021-05-22 DIAGNOSIS — N39.0 UTI (URINARY TRACT INFECTION), BACTERIAL: ICD-10-CM

## 2021-05-22 DIAGNOSIS — A49.9 UTI (URINARY TRACT INFECTION), BACTERIAL: ICD-10-CM

## 2021-05-22 DIAGNOSIS — N23 RENAL COLIC ON RIGHT SIDE: Primary | ICD-10-CM

## 2021-05-22 LAB
ALBUMIN SERPL-MCNC: 4.1 G/DL (ref 3.5–5.2)
ALBUMIN/GLOB SERPL: 1.3 G/DL
ALP SERPL-CCNC: 159 U/L (ref 39–117)
ALT SERPL W P-5'-P-CCNC: 18 U/L (ref 1–33)
ANION GAP SERPL CALCULATED.3IONS-SCNC: 12.4 MMOL/L (ref 5–15)
AST SERPL-CCNC: 20 U/L (ref 1–32)
BACTERIA UR QL AUTO: ABNORMAL /HPF
BASOPHILS # BLD AUTO: 0.07 10*3/MM3 (ref 0–0.2)
BASOPHILS NFR BLD AUTO: 0.8 % (ref 0–1.5)
BILIRUB SERPL-MCNC: 0.3 MG/DL (ref 0–1.2)
BILIRUB UR QL STRIP: NEGATIVE
BUN SERPL-MCNC: 8 MG/DL (ref 6–20)
BUN/CREAT SERPL: 7.8 (ref 7–25)
CALCIUM SPEC-SCNC: 9.5 MG/DL (ref 8.6–10.5)
CHLORIDE SERPL-SCNC: 100 MMOL/L (ref 98–107)
CLARITY UR: ABNORMAL
CO2 SERPL-SCNC: 21.6 MMOL/L (ref 22–29)
COLOR UR: ABNORMAL
CREAT SERPL-MCNC: 1.02 MG/DL (ref 0.57–1)
DEPRECATED RDW RBC AUTO: 37.6 FL (ref 37–54)
EOSINOPHIL # BLD AUTO: 0.35 10*3/MM3 (ref 0–0.4)
EOSINOPHIL NFR BLD AUTO: 4 % (ref 0.3–6.2)
ERYTHROCYTE [DISTWIDTH] IN BLOOD BY AUTOMATED COUNT: 13 % (ref 12.3–15.4)
GFR SERPL CREATININE-BSD FRML MDRD: 57 ML/MIN/1.73
GLOBULIN UR ELPH-MCNC: 3.1 GM/DL
GLUCOSE SERPL-MCNC: 374 MG/DL (ref 65–99)
GLUCOSE UR STRIP-MCNC: ABNORMAL MG/DL
HCT VFR BLD AUTO: 46.3 % (ref 34–46.6)
HGB BLD-MCNC: 15.6 G/DL (ref 12–15.9)
HGB UR QL STRIP.AUTO: ABNORMAL
HYALINE CASTS UR QL AUTO: ABNORMAL /LPF
IMM GRANULOCYTES # BLD AUTO: 0.1 10*3/MM3 (ref 0–0.05)
IMM GRANULOCYTES NFR BLD AUTO: 1.2 % (ref 0–0.5)
KETONES UR QL STRIP: NEGATIVE
LEUKOCYTE ESTERASE UR QL STRIP.AUTO: ABNORMAL
LIPASE SERPL-CCNC: 151 U/L (ref 13–60)
LYMPHOCYTES # BLD AUTO: 1.9 10*3/MM3 (ref 0.7–3.1)
LYMPHOCYTES NFR BLD AUTO: 21.9 % (ref 19.6–45.3)
MCH RBC QN AUTO: 27.4 PG (ref 26.6–33)
MCHC RBC AUTO-ENTMCNC: 33.7 G/DL (ref 31.5–35.7)
MCV RBC AUTO: 81.4 FL (ref 79–97)
MONOCYTES # BLD AUTO: 0.83 10*3/MM3 (ref 0.1–0.9)
MONOCYTES NFR BLD AUTO: 9.6 % (ref 5–12)
NEUTROPHILS NFR BLD AUTO: 5.41 10*3/MM3 (ref 1.7–7)
NEUTROPHILS NFR BLD AUTO: 62.5 % (ref 42.7–76)
NITRITE UR QL STRIP: NEGATIVE
NRBC BLD AUTO-RTO: 0 /100 WBC (ref 0–0.2)
PH UR STRIP.AUTO: <=5 [PH] (ref 5–8)
PLATELET # BLD AUTO: 225 10*3/MM3 (ref 140–450)
PMV BLD AUTO: 9.5 FL (ref 6–12)
POTASSIUM SERPL-SCNC: 3.9 MMOL/L (ref 3.5–5.2)
PROT SERPL-MCNC: 7.2 G/DL (ref 6–8.5)
PROT UR QL STRIP: ABNORMAL
RBC # BLD AUTO: 5.69 10*6/MM3 (ref 3.77–5.28)
RBC # UR: ABNORMAL /HPF
REF LAB TEST METHOD: ABNORMAL
SODIUM SERPL-SCNC: 134 MMOL/L (ref 136–145)
SP GR UR STRIP: 1.02 (ref 1–1.03)
SQUAMOUS #/AREA URNS HPF: ABNORMAL /HPF
UROBILINOGEN UR QL STRIP: ABNORMAL
WBC # BLD AUTO: 8.66 10*3/MM3 (ref 3.4–10.8)
WBC UR QL AUTO: ABNORMAL /HPF

## 2021-05-22 PROCEDURE — 25010000002 HYDROMORPHONE PER 4 MG: Performed by: EMERGENCY MEDICINE

## 2021-05-22 PROCEDURE — 80053 COMPREHEN METABOLIC PANEL: CPT | Performed by: EMERGENCY MEDICINE

## 2021-05-22 PROCEDURE — 99283 EMERGENCY DEPT VISIT LOW MDM: CPT

## 2021-05-22 PROCEDURE — 96365 THER/PROPH/DIAG IV INF INIT: CPT

## 2021-05-22 PROCEDURE — 85025 COMPLETE CBC W/AUTO DIFF WBC: CPT | Performed by: EMERGENCY MEDICINE

## 2021-05-22 PROCEDURE — 25010000002 ONDANSETRON PER 1 MG: Performed by: EMERGENCY MEDICINE

## 2021-05-22 PROCEDURE — 81001 URINALYSIS AUTO W/SCOPE: CPT | Performed by: EMERGENCY MEDICINE

## 2021-05-22 PROCEDURE — 25010000002 KETOROLAC TROMETHAMINE PER 15 MG: Performed by: EMERGENCY MEDICINE

## 2021-05-22 PROCEDURE — 83690 ASSAY OF LIPASE: CPT | Performed by: EMERGENCY MEDICINE

## 2021-05-22 PROCEDURE — 74176 CT ABD & PELVIS W/O CONTRAST: CPT

## 2021-05-22 PROCEDURE — 96375 TX/PRO/DX INJ NEW DRUG ADDON: CPT

## 2021-05-22 PROCEDURE — 25010000002 CEFTRIAXONE PER 250 MG: Performed by: EMERGENCY MEDICINE

## 2021-05-22 RX ORDER — SODIUM CHLORIDE 0.9 % (FLUSH) 0.9 %
10 SYRINGE (ML) INJECTION AS NEEDED
Status: DISCONTINUED | OUTPATIENT
Start: 2021-05-22 | End: 2021-05-23 | Stop reason: HOSPADM

## 2021-05-22 RX ORDER — CEFTRIAXONE SODIUM 1 G/50ML
1 INJECTION, SOLUTION INTRAVENOUS ONCE
Status: COMPLETED | OUTPATIENT
Start: 2021-05-22 | End: 2021-05-23

## 2021-05-22 RX ORDER — ONDANSETRON 2 MG/ML
4 INJECTION INTRAMUSCULAR; INTRAVENOUS ONCE
Status: COMPLETED | OUTPATIENT
Start: 2021-05-22 | End: 2021-05-22

## 2021-05-22 RX ORDER — HYDROMORPHONE HYDROCHLORIDE 1 MG/ML
0.5 INJECTION, SOLUTION INTRAMUSCULAR; INTRAVENOUS; SUBCUTANEOUS ONCE
Status: COMPLETED | OUTPATIENT
Start: 2021-05-22 | End: 2021-05-22

## 2021-05-22 RX ORDER — KETOROLAC TROMETHAMINE 15 MG/ML
15 INJECTION, SOLUTION INTRAMUSCULAR; INTRAVENOUS ONCE
Status: COMPLETED | OUTPATIENT
Start: 2021-05-22 | End: 2021-05-22

## 2021-05-22 RX ADMIN — SODIUM CHLORIDE 1000 ML: 9 INJECTION, SOLUTION INTRAVENOUS at 21:20

## 2021-05-22 RX ADMIN — CEFTRIAXONE SODIUM 1 G: 1 INJECTION, SOLUTION INTRAVENOUS at 23:57

## 2021-05-22 RX ADMIN — HYDROMORPHONE HYDROCHLORIDE 0.5 MG: 1 INJECTION, SOLUTION INTRAMUSCULAR; INTRAVENOUS; SUBCUTANEOUS at 21:22

## 2021-05-22 RX ADMIN — ONDANSETRON 4 MG: 2 INJECTION INTRAMUSCULAR; INTRAVENOUS at 21:20

## 2021-05-22 RX ADMIN — KETOROLAC TROMETHAMINE 15 MG: 15 INJECTION, SOLUTION INTRAMUSCULAR; INTRAVENOUS at 21:21

## 2021-05-22 NOTE — ED NOTES
Pt ambulatory to triage from home with c/o right flank pain - diagnosed recently with kidney stone.  States pain is increasing throughout day.  Pt denies dysuria and hematuria.  Pt provided with mask in triage.  Triage personnel wore appropriate PPE       Jeniffer Morgan RN  05/22/21 5571

## 2021-05-23 VITALS
OXYGEN SATURATION: 98 % | BODY MASS INDEX: 28.35 KG/M2 | HEART RATE: 92 BPM | DIASTOLIC BLOOD PRESSURE: 99 MMHG | RESPIRATION RATE: 16 BRPM | TEMPERATURE: 98.5 F | HEIGHT: 62 IN | SYSTOLIC BLOOD PRESSURE: 134 MMHG

## 2021-05-23 RX ORDER — HYDROCODONE BITARTRATE AND ACETAMINOPHEN 5; 325 MG/1; MG/1
1 TABLET ORAL EVERY 4 HOURS PRN
Qty: 18 TABLET | Refills: 0 | Status: SHIPPED | OUTPATIENT
Start: 2021-05-23 | End: 2021-06-03 | Stop reason: HOSPADM

## 2021-05-23 RX ORDER — HYDROCODONE BITARTRATE AND ACETAMINOPHEN 5; 325 MG/1; MG/1
1 TABLET ORAL EVERY 4 HOURS PRN
Qty: 18 TABLET | Refills: 0 | Status: SHIPPED | OUTPATIENT
Start: 2021-05-23 | End: 2021-05-23 | Stop reason: SDUPTHER

## 2021-05-23 RX ORDER — SULFAMETHOXAZOLE AND TRIMETHOPRIM 800; 160 MG/1; MG/1
1 TABLET ORAL 2 TIMES DAILY
Qty: 14 TABLET | Refills: 0 | Status: SHIPPED | OUTPATIENT
Start: 2021-05-23 | End: 2021-06-03 | Stop reason: HOSPADM

## 2021-05-23 NOTE — ED NOTES
Pt states she has had a known kidney stone in the right kidney since October of last year. Pt states s Pt in mask in room. This RN wore appropriate PPE throughout encounter with pt, hand hygiene performed prior to entering room and upon exiting the room. he has had increased right flank pain for the last 2 days and nausea.      Caitlin Del Rosario, RN  05/22/21 2046
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
## 2021-05-28 ENCOUNTER — APPOINTMENT (OUTPATIENT)
Dept: CT IMAGING | Facility: HOSPITAL | Age: 51
End: 2021-05-28

## 2021-05-28 ENCOUNTER — HOSPITAL ENCOUNTER (EMERGENCY)
Facility: HOSPITAL | Age: 51
Discharge: HOME | End: 2021-05-28
Attending: EMERGENCY MEDICINE | Admitting: EMERGENCY MEDICINE

## 2021-05-28 VITALS
SYSTOLIC BLOOD PRESSURE: 126 MMHG | RESPIRATION RATE: 18 BRPM | DIASTOLIC BLOOD PRESSURE: 93 MMHG | HEART RATE: 108 BPM | HEIGHT: 62 IN | TEMPERATURE: 98.2 F | OXYGEN SATURATION: 95 % | WEIGHT: 159 LBS | BODY MASS INDEX: 29.26 KG/M2

## 2021-05-28 DIAGNOSIS — R10.9 RIGHT FLANK PAIN: Primary | ICD-10-CM

## 2021-05-28 LAB
ALBUMIN SERPL-MCNC: 4.3 G/DL (ref 3.5–5.2)
ALBUMIN/GLOB SERPL: 1.3 G/DL
ALP SERPL-CCNC: 169 U/L (ref 39–117)
ALT SERPL W P-5'-P-CCNC: 21 U/L (ref 1–33)
ANION GAP SERPL CALCULATED.3IONS-SCNC: 11.1 MMOL/L (ref 5–15)
AST SERPL-CCNC: 19 U/L (ref 1–32)
BACTERIA UR QL AUTO: ABNORMAL /HPF
BASOPHILS # BLD AUTO: 0.08 10*3/MM3 (ref 0–0.2)
BASOPHILS NFR BLD AUTO: 0.9 % (ref 0–1.5)
BILIRUB SERPL-MCNC: 0.3 MG/DL (ref 0–1.2)
BILIRUB UR QL STRIP: NEGATIVE
BUN SERPL-MCNC: 10 MG/DL (ref 6–20)
BUN/CREAT SERPL: 8.5 (ref 7–25)
CALCIUM SPEC-SCNC: 9.7 MG/DL (ref 8.6–10.5)
CHLORIDE SERPL-SCNC: 102 MMOL/L (ref 98–107)
CLARITY UR: CLEAR
CO2 SERPL-SCNC: 19.9 MMOL/L (ref 22–29)
COLOR UR: YELLOW
CREAT SERPL-MCNC: 1.18 MG/DL (ref 0.57–1)
DEPRECATED RDW RBC AUTO: 38.7 FL (ref 37–54)
EOSINOPHIL # BLD AUTO: 0.17 10*3/MM3 (ref 0–0.4)
EOSINOPHIL NFR BLD AUTO: 1.9 % (ref 0.3–6.2)
ERYTHROCYTE [DISTWIDTH] IN BLOOD BY AUTOMATED COUNT: 13.2 % (ref 12.3–15.4)
GFR SERPL CREATININE-BSD FRML MDRD: 48 ML/MIN/1.73
GLOBULIN UR ELPH-MCNC: 3.2 GM/DL
GLUCOSE SERPL-MCNC: 307 MG/DL (ref 65–99)
GLUCOSE UR STRIP-MCNC: ABNORMAL MG/DL
HCT VFR BLD AUTO: 48.2 % (ref 34–46.6)
HGB BLD-MCNC: 16.1 G/DL (ref 12–15.9)
HGB UR QL STRIP.AUTO: ABNORMAL
HOLD SPECIMEN: NORMAL
HOLD SPECIMEN: NORMAL
HYALINE CASTS UR QL AUTO: ABNORMAL /LPF
IMM GRANULOCYTES # BLD AUTO: 0.03 10*3/MM3 (ref 0–0.05)
IMM GRANULOCYTES NFR BLD AUTO: 0.3 % (ref 0–0.5)
KETONES UR QL STRIP: NEGATIVE
LEUKOCYTE ESTERASE UR QL STRIP.AUTO: ABNORMAL
LIPASE SERPL-CCNC: 77 U/L (ref 13–60)
LYMPHOCYTES # BLD AUTO: 2.1 10*3/MM3 (ref 0.7–3.1)
LYMPHOCYTES NFR BLD AUTO: 23.5 % (ref 19.6–45.3)
MCH RBC QN AUTO: 27.5 PG (ref 26.6–33)
MCHC RBC AUTO-ENTMCNC: 33.4 G/DL (ref 31.5–35.7)
MCV RBC AUTO: 82.3 FL (ref 79–97)
MONOCYTES # BLD AUTO: 0.77 10*3/MM3 (ref 0.1–0.9)
MONOCYTES NFR BLD AUTO: 8.6 % (ref 5–12)
NEUTROPHILS NFR BLD AUTO: 5.77 10*3/MM3 (ref 1.7–7)
NEUTROPHILS NFR BLD AUTO: 64.8 % (ref 42.7–76)
NITRITE UR QL STRIP: NEGATIVE
NRBC BLD AUTO-RTO: 0 /100 WBC (ref 0–0.2)
PH UR STRIP.AUTO: 6 [PH] (ref 5–8)
PLATELET # BLD AUTO: 208 10*3/MM3 (ref 140–450)
PMV BLD AUTO: 9.8 FL (ref 6–12)
POTASSIUM SERPL-SCNC: 4.3 MMOL/L (ref 3.5–5.2)
PROT SERPL-MCNC: 7.5 G/DL (ref 6–8.5)
PROT UR QL STRIP: NEGATIVE
RBC # BLD AUTO: 5.86 10*6/MM3 (ref 3.77–5.28)
RBC # UR: ABNORMAL /HPF
REF LAB TEST METHOD: ABNORMAL
SODIUM SERPL-SCNC: 133 MMOL/L (ref 136–145)
SP GR UR STRIP: 1.01 (ref 1–1.03)
SQUAMOUS #/AREA URNS HPF: ABNORMAL /HPF
UROBILINOGEN UR QL STRIP: ABNORMAL
WBC # BLD AUTO: 8.92 10*3/MM3 (ref 3.4–10.8)
WBC UR QL AUTO: ABNORMAL /HPF
WHOLE BLOOD HOLD SPECIMEN: NORMAL
WHOLE BLOOD HOLD SPECIMEN: NORMAL

## 2021-05-28 PROCEDURE — 36415 COLL VENOUS BLD VENIPUNCTURE: CPT

## 2021-05-28 PROCEDURE — 96374 THER/PROPH/DIAG INJ IV PUSH: CPT

## 2021-05-28 PROCEDURE — 96375 TX/PRO/DX INJ NEW DRUG ADDON: CPT

## 2021-05-28 PROCEDURE — 83690 ASSAY OF LIPASE: CPT

## 2021-05-28 PROCEDURE — 80053 COMPREHEN METABOLIC PANEL: CPT

## 2021-05-28 PROCEDURE — 74176 CT ABD & PELVIS W/O CONTRAST: CPT

## 2021-05-28 PROCEDURE — 25010000002 HYDROMORPHONE PER 4 MG: Performed by: EMERGENCY MEDICINE

## 2021-05-28 PROCEDURE — 99283 EMERGENCY DEPT VISIT LOW MDM: CPT

## 2021-05-28 PROCEDURE — 81001 URINALYSIS AUTO W/SCOPE: CPT

## 2021-05-28 PROCEDURE — 25010000002 ONDANSETRON PER 1 MG: Performed by: EMERGENCY MEDICINE

## 2021-05-28 PROCEDURE — 85025 COMPLETE CBC W/AUTO DIFF WBC: CPT

## 2021-05-28 RX ORDER — ONDANSETRON 2 MG/ML
4 INJECTION INTRAMUSCULAR; INTRAVENOUS ONCE
Status: COMPLETED | OUTPATIENT
Start: 2021-05-28 | End: 2021-05-28

## 2021-05-28 RX ORDER — SODIUM CHLORIDE 0.9 % (FLUSH) 0.9 %
10 SYRINGE (ML) INJECTION AS NEEDED
Status: DISCONTINUED | OUTPATIENT
Start: 2021-05-28 | End: 2021-05-29 | Stop reason: HOSPADM

## 2021-05-28 RX ORDER — PROMETHAZINE HYDROCHLORIDE 25 MG/1
25 TABLET ORAL EVERY 6 HOURS PRN
Qty: 10 TABLET | Refills: 0 | Status: SHIPPED | OUTPATIENT
Start: 2021-05-28 | End: 2021-06-03 | Stop reason: HOSPADM

## 2021-05-28 RX ORDER — HYDROMORPHONE HYDROCHLORIDE 1 MG/ML
0.5 INJECTION, SOLUTION INTRAMUSCULAR; INTRAVENOUS; SUBCUTANEOUS ONCE
Status: COMPLETED | OUTPATIENT
Start: 2021-05-28 | End: 2021-05-28

## 2021-05-28 RX ORDER — NAPROXEN 250 MG/1
250 TABLET ORAL 2 TIMES DAILY PRN
Qty: 20 TABLET | Refills: 0 | Status: SHIPPED | OUTPATIENT
Start: 2021-05-28 | End: 2021-06-03 | Stop reason: HOSPADM

## 2021-05-28 RX ADMIN — ONDANSETRON 4 MG: 2 INJECTION INTRAMUSCULAR; INTRAVENOUS at 20:29

## 2021-05-28 RX ADMIN — HYDROMORPHONE HYDROCHLORIDE 0.5 MG: 1 INJECTION, SOLUTION INTRAMUSCULAR; INTRAVENOUS; SUBCUTANEOUS at 20:30

## 2021-05-30 ENCOUNTER — HOSPITAL ENCOUNTER (EMERGENCY)
Facility: HOSPITAL | Age: 51
Discharge: HOME | End: 2021-05-31
Attending: EMERGENCY MEDICINE | Admitting: EMERGENCY MEDICINE

## 2021-05-30 ENCOUNTER — APPOINTMENT (OUTPATIENT)
Dept: GENERAL RADIOLOGY | Facility: HOSPITAL | Age: 51
End: 2021-05-30

## 2021-05-30 DIAGNOSIS — R11.0 NAUSEA: ICD-10-CM

## 2021-05-30 DIAGNOSIS — R00.0 TACHYCARDIA: ICD-10-CM

## 2021-05-30 DIAGNOSIS — R05.9 COUGH: ICD-10-CM

## 2021-05-30 DIAGNOSIS — R73.9 HYPERGLYCEMIA: Primary | ICD-10-CM

## 2021-05-30 LAB
ALBUMIN SERPL-MCNC: 4.3 G/DL (ref 3.5–5.2)
ALBUMIN/GLOB SERPL: 1.4 G/DL
ALP SERPL-CCNC: 161 U/L (ref 39–117)
ALT SERPL W P-5'-P-CCNC: 22 U/L (ref 1–33)
ANION GAP SERPL CALCULATED.3IONS-SCNC: 12.5 MMOL/L (ref 5–15)
AST SERPL-CCNC: 21 U/L (ref 1–32)
ATMOSPHERIC PRESS: 759 MMHG
B-OH-BUTYR SERPL-SCNC: 0.07 MMOL/L (ref 0.02–0.27)
BACTERIA UR QL AUTO: ABNORMAL /HPF
BASE EXCESS BLDV CALC-SCNC: -3.4 MMOL/L (ref -2–2)
BASOPHILS # BLD AUTO: 0.06 10*3/MM3 (ref 0–0.2)
BASOPHILS NFR BLD AUTO: 0.7 % (ref 0–1.5)
BDY SITE: ABNORMAL
BILIRUB SERPL-MCNC: 0.2 MG/DL (ref 0–1.2)
BILIRUB UR QL STRIP: NEGATIVE
BUN SERPL-MCNC: 12 MG/DL (ref 6–20)
BUN/CREAT SERPL: 10.1 (ref 7–25)
CALCIUM SPEC-SCNC: 9.6 MG/DL (ref 8.6–10.5)
CHLORIDE SERPL-SCNC: 101 MMOL/L (ref 98–107)
CLARITY UR: ABNORMAL
CO2 SERPL-SCNC: 21.5 MMOL/L (ref 22–29)
COD CRY URNS QL: ABNORMAL /HPF
COLOR UR: ABNORMAL
CREAT SERPL-MCNC: 1.19 MG/DL (ref 0.57–1)
DEPRECATED RDW RBC AUTO: 38.7 FL (ref 37–54)
EOSINOPHIL # BLD AUTO: 0.26 10*3/MM3 (ref 0–0.4)
EOSINOPHIL NFR BLD AUTO: 3.1 % (ref 0.3–6.2)
ERYTHROCYTE [DISTWIDTH] IN BLOOD BY AUTOMATED COUNT: 13.6 % (ref 12.3–15.4)
GFR SERPL CREATININE-BSD FRML MDRD: 48 ML/MIN/1.73
GLOBULIN UR ELPH-MCNC: 3 GM/DL
GLUCOSE BLDC GLUCOMTR-MCNC: 120 MG/DL (ref 70–130)
GLUCOSE BLDC GLUCOMTR-MCNC: 273 MG/DL (ref 70–130)
GLUCOSE SERPL-MCNC: 266 MG/DL (ref 65–99)
GLUCOSE UR STRIP-MCNC: NEGATIVE MG/DL
HCO3 BLDV-SCNC: 22.5 MMOL/L (ref 22–28)
HCT VFR BLD AUTO: 46.7 % (ref 34–46.6)
HGB BLD-MCNC: 15.6 G/DL (ref 12–15.9)
HGB UR QL STRIP.AUTO: NEGATIVE
HOLD SPECIMEN: NORMAL
HOLD SPECIMEN: NORMAL
HYALINE CASTS UR QL AUTO: ABNORMAL /LPF
IMM GRANULOCYTES # BLD AUTO: 0.05 10*3/MM3 (ref 0–0.05)
IMM GRANULOCYTES NFR BLD AUTO: 0.6 % (ref 0–0.5)
INHALED O2 CONCENTRATION: 21 %
KETONES UR QL STRIP: ABNORMAL
LEUKOCYTE ESTERASE UR QL STRIP.AUTO: ABNORMAL
LIPASE SERPL-CCNC: 65 U/L (ref 13–60)
LYMPHOCYTES # BLD AUTO: 2.38 10*3/MM3 (ref 0.7–3.1)
LYMPHOCYTES NFR BLD AUTO: 28.1 % (ref 19.6–45.3)
MCH RBC QN AUTO: 26.9 PG (ref 26.6–33)
MCHC RBC AUTO-ENTMCNC: 33.4 G/DL (ref 31.5–35.7)
MCV RBC AUTO: 80.5 FL (ref 79–97)
MODALITY: ABNORMAL
MONOCYTES # BLD AUTO: 0.71 10*3/MM3 (ref 0.1–0.9)
MONOCYTES NFR BLD AUTO: 8.4 % (ref 5–12)
NEUTROPHILS NFR BLD AUTO: 5.02 10*3/MM3 (ref 1.7–7)
NEUTROPHILS NFR BLD AUTO: 59.1 % (ref 42.7–76)
NITRITE UR QL STRIP: NEGATIVE
NRBC BLD AUTO-RTO: 0 /100 WBC (ref 0–0.2)
PCO2 BLDV: 42.5 MM HG (ref 41–51)
PH BLDV: 7.33 PH UNITS (ref 7.31–7.41)
PH UR STRIP.AUTO: 5.5 [PH] (ref 5–8)
PLATELET # BLD AUTO: 229 10*3/MM3 (ref 140–450)
PMV BLD AUTO: 9.5 FL (ref 6–12)
PO2 BLDV: 43.5 MM HG (ref 35–45)
POTASSIUM SERPL-SCNC: 3.5 MMOL/L (ref 3.5–5.2)
PROT SERPL-MCNC: 7.3 G/DL (ref 6–8.5)
PROT UR QL STRIP: ABNORMAL
RBC # BLD AUTO: 5.8 10*6/MM3 (ref 3.77–5.28)
RBC # UR: ABNORMAL /HPF
REF LAB TEST METHOD: ABNORMAL
SAO2 % BLDCOA: 75.8 % (ref 92–99)
SODIUM SERPL-SCNC: 135 MMOL/L (ref 136–145)
SP GR UR STRIP: 1.03 (ref 1–1.03)
SQUAMOUS #/AREA URNS HPF: ABNORMAL /HPF
TOTAL RATE: 20 BREATHS/MINUTE
TROPONIN T SERPL-MCNC: <0.01 NG/ML (ref 0–0.03)
UROBILINOGEN UR QL STRIP: ABNORMAL
WBC # BLD AUTO: 8.48 10*3/MM3 (ref 3.4–10.8)
WBC UR QL AUTO: ABNORMAL /HPF
WHOLE BLOOD HOLD SPECIMEN: NORMAL
WHOLE BLOOD HOLD SPECIMEN: NORMAL

## 2021-05-30 PROCEDURE — 82962 GLUCOSE BLOOD TEST: CPT

## 2021-05-30 PROCEDURE — 96375 TX/PRO/DX INJ NEW DRUG ADDON: CPT

## 2021-05-30 PROCEDURE — 81001 URINALYSIS AUTO W/SCOPE: CPT | Performed by: EMERGENCY MEDICINE

## 2021-05-30 PROCEDURE — 83690 ASSAY OF LIPASE: CPT | Performed by: EMERGENCY MEDICINE

## 2021-05-30 PROCEDURE — 25010000002 KETOROLAC TROMETHAMINE PER 15 MG: Performed by: EMERGENCY MEDICINE

## 2021-05-30 PROCEDURE — 96361 HYDRATE IV INFUSION ADD-ON: CPT

## 2021-05-30 PROCEDURE — 84484 ASSAY OF TROPONIN QUANT: CPT | Performed by: EMERGENCY MEDICINE

## 2021-05-30 PROCEDURE — 99283 EMERGENCY DEPT VISIT LOW MDM: CPT

## 2021-05-30 PROCEDURE — 80053 COMPREHEN METABOLIC PANEL: CPT | Performed by: EMERGENCY MEDICINE

## 2021-05-30 PROCEDURE — 82803 BLOOD GASES ANY COMBINATION: CPT

## 2021-05-30 PROCEDURE — 71045 X-RAY EXAM CHEST 1 VIEW: CPT

## 2021-05-30 PROCEDURE — 82010 KETONE BODYS QUAN: CPT | Performed by: EMERGENCY MEDICINE

## 2021-05-30 PROCEDURE — 93005 ELECTROCARDIOGRAM TRACING: CPT | Performed by: EMERGENCY MEDICINE

## 2021-05-30 PROCEDURE — 93010 ELECTROCARDIOGRAM REPORT: CPT | Performed by: INTERNAL MEDICINE

## 2021-05-30 PROCEDURE — 85025 COMPLETE CBC W/AUTO DIFF WBC: CPT | Performed by: EMERGENCY MEDICINE

## 2021-05-30 PROCEDURE — 96374 THER/PROPH/DIAG INJ IV PUSH: CPT

## 2021-05-30 PROCEDURE — 25010000002 ONDANSETRON PER 1 MG: Performed by: EMERGENCY MEDICINE

## 2021-05-30 PROCEDURE — 63710000001 INSULIN REGULAR HUMAN PER 5 UNITS: Performed by: EMERGENCY MEDICINE

## 2021-05-30 RX ORDER — ONDANSETRON 2 MG/ML
8 INJECTION INTRAMUSCULAR; INTRAVENOUS ONCE
Status: COMPLETED | OUTPATIENT
Start: 2021-05-30 | End: 2021-05-30

## 2021-05-30 RX ORDER — KETOROLAC TROMETHAMINE 15 MG/ML
15 INJECTION, SOLUTION INTRAMUSCULAR; INTRAVENOUS ONCE
Status: COMPLETED | OUTPATIENT
Start: 2021-05-30 | End: 2021-05-30

## 2021-05-30 RX ADMIN — KETOROLAC TROMETHAMINE 15 MG: 15 INJECTION, SOLUTION INTRAMUSCULAR; INTRAVENOUS at 23:19

## 2021-05-30 RX ADMIN — INSULIN HUMAN 10 UNITS: 100 INJECTION, SOLUTION PARENTERAL at 23:18

## 2021-05-30 RX ADMIN — SODIUM CHLORIDE, POTASSIUM CHLORIDE, SODIUM LACTATE AND CALCIUM CHLORIDE 1000 ML: 600; 310; 30; 20 INJECTION, SOLUTION INTRAVENOUS at 22:52

## 2021-05-30 RX ADMIN — ONDANSETRON 8 MG: 2 INJECTION INTRAMUSCULAR; INTRAVENOUS at 22:54

## 2021-05-31 VITALS
SYSTOLIC BLOOD PRESSURE: 116 MMHG | HEART RATE: 98 BPM | DIASTOLIC BLOOD PRESSURE: 83 MMHG | TEMPERATURE: 98.5 F | BODY MASS INDEX: 29.08 KG/M2 | OXYGEN SATURATION: 96 % | HEIGHT: 62 IN | RESPIRATION RATE: 16 BRPM

## 2021-05-31 LAB
BILIRUB UR QL STRIP: NEGATIVE
CLARITY UR: CLEAR
COLOR UR: YELLOW
GLUCOSE UR STRIP-MCNC: NEGATIVE MG/DL
HGB UR QL STRIP.AUTO: NEGATIVE
KETONES UR QL STRIP: NEGATIVE
LEUKOCYTE ESTERASE UR QL STRIP.AUTO: NEGATIVE
NITRITE UR QL STRIP: NEGATIVE
PH UR STRIP.AUTO: 6.5 [PH] (ref 5–8)
PROT UR QL STRIP: NEGATIVE
QT INTERVAL: 353 MS
SP GR UR STRIP: 1.01 (ref 1–1.03)
UROBILINOGEN UR QL STRIP: NORMAL

## 2021-05-31 PROCEDURE — P9612 CATHETERIZE FOR URINE SPEC: HCPCS

## 2021-05-31 PROCEDURE — 81003 URINALYSIS AUTO W/O SCOPE: CPT | Performed by: EMERGENCY MEDICINE

## 2021-05-31 PROCEDURE — 96361 HYDRATE IV INFUSION ADD-ON: CPT

## 2021-05-31 RX ORDER — ONDANSETRON 8 MG/1
8 TABLET, ORALLY DISINTEGRATING ORAL EVERY 8 HOURS PRN
Qty: 15 TABLET | Refills: 0 | Status: SHIPPED | OUTPATIENT
Start: 2021-05-31 | End: 2022-04-28

## 2021-05-31 RX ADMIN — SODIUM CHLORIDE, POTASSIUM CHLORIDE, SODIUM LACTATE AND CALCIUM CHLORIDE 1000 ML: 600; 310; 30; 20 INJECTION, SOLUTION INTRAVENOUS at 01:31

## 2021-06-01 ENCOUNTER — HOSPITAL ENCOUNTER (OUTPATIENT)
Facility: HOSPITAL | Age: 51
Discharge: HOME | End: 2021-06-03
Attending: HOSPITALIST | Admitting: HOSPITALIST

## 2021-06-01 LAB — GLUCOSE BLDC GLUCOMTR-MCNC: 123 MG/DL (ref 70–130)

## 2021-06-01 PROCEDURE — 96375 TX/PRO/DX INJ NEW DRUG ADDON: CPT

## 2021-06-01 PROCEDURE — 96374 THER/PROPH/DIAG INJ IV PUSH: CPT

## 2021-06-01 PROCEDURE — 25010000002 HYDROMORPHONE PER 4 MG: Performed by: UROLOGY

## 2021-06-01 PROCEDURE — 82962 GLUCOSE BLOOD TEST: CPT

## 2021-06-01 PROCEDURE — 25010000002 ONDANSETRON PER 1 MG: Performed by: UROLOGY

## 2021-06-01 PROCEDURE — C9803 HOPD COVID-19 SPEC COLLECT: HCPCS

## 2021-06-01 PROCEDURE — 25010000002 HYDROMORPHONE PER 4 MG: Performed by: HOSPITALIST

## 2021-06-01 PROCEDURE — U0004 COV-19 TEST NON-CDC HGH THRU: HCPCS | Performed by: HOSPITALIST

## 2021-06-01 PROCEDURE — 25010000002 ONDANSETRON PER 1 MG: Performed by: HOSPITALIST

## 2021-06-01 RX ORDER — HYDROMORPHONE HYDROCHLORIDE 1 MG/ML
0.5 INJECTION, SOLUTION INTRAMUSCULAR; INTRAVENOUS; SUBCUTANEOUS EVERY 4 HOURS PRN
Status: DISCONTINUED | OUTPATIENT
Start: 2021-06-01 | End: 2021-06-03

## 2021-06-01 RX ORDER — SODIUM CHLORIDE 9 MG/ML
75 INJECTION, SOLUTION INTRAVENOUS CONTINUOUS
Status: DISCONTINUED | OUTPATIENT
Start: 2021-06-01 | End: 2021-06-03

## 2021-06-01 RX ORDER — HYDROMORPHONE HYDROCHLORIDE 1 MG/ML
0.5 INJECTION, SOLUTION INTRAMUSCULAR; INTRAVENOUS; SUBCUTANEOUS
Status: DISCONTINUED | OUTPATIENT
Start: 2021-06-01 | End: 2021-06-01

## 2021-06-01 RX ORDER — ONDANSETRON 2 MG/ML
4 INJECTION INTRAMUSCULAR; INTRAVENOUS EVERY 6 HOURS PRN
Status: DISCONTINUED | OUTPATIENT
Start: 2021-06-01 | End: 2021-06-03

## 2021-06-01 RX ORDER — METOPROLOL TARTRATE 50 MG/1
50 TABLET, FILM COATED ORAL EVERY 12 HOURS SCHEDULED
Status: DISCONTINUED | OUTPATIENT
Start: 2021-06-01 | End: 2021-06-03 | Stop reason: HOSPADM

## 2021-06-01 RX ADMIN — HYDROMORPHONE HYDROCHLORIDE 0.5 MG: 1 INJECTION, SOLUTION INTRAMUSCULAR; INTRAVENOUS; SUBCUTANEOUS at 20:03

## 2021-06-01 RX ADMIN — ONDANSETRON 4 MG: 2 INJECTION INTRAMUSCULAR; INTRAVENOUS at 20:04

## 2021-06-01 RX ADMIN — SODIUM CHLORIDE 75 ML/HR: 9 INJECTION, SOLUTION INTRAVENOUS at 20:08

## 2021-06-01 RX ADMIN — METOPROLOL TARTRATE 50 MG: 50 TABLET, FILM COATED ORAL at 20:07

## 2021-06-02 ENCOUNTER — APPOINTMENT (OUTPATIENT)
Dept: GENERAL RADIOLOGY | Facility: HOSPITAL | Age: 51
End: 2021-06-02

## 2021-06-02 ENCOUNTER — ANESTHESIA EVENT (OUTPATIENT)
Dept: PERIOP | Facility: HOSPITAL | Age: 51
End: 2021-06-02

## 2021-06-02 ENCOUNTER — ANESTHESIA (OUTPATIENT)
Dept: PERIOP | Facility: HOSPITAL | Age: 51
End: 2021-06-02

## 2021-06-02 PROBLEM — N20.0 RIGHT RENAL STONE: Status: ACTIVE | Noted: 2021-06-02

## 2021-06-02 LAB
ANION GAP SERPL CALCULATED.3IONS-SCNC: 10.3 MMOL/L (ref 5–15)
BACTERIA UR QL AUTO: ABNORMAL /HPF
BILIRUB UR QL STRIP: NEGATIVE
BUN SERPL-MCNC: 7 MG/DL (ref 6–20)
BUN/CREAT SERPL: 7.4 (ref 7–25)
CALCIUM SPEC-SCNC: 8.7 MG/DL (ref 8.6–10.5)
CHLORIDE SERPL-SCNC: 108 MMOL/L (ref 98–107)
CLARITY UR: CLEAR
CO2 SERPL-SCNC: 23.7 MMOL/L (ref 22–29)
COLOR UR: YELLOW
CREAT SERPL-MCNC: 0.94 MG/DL (ref 0.57–1)
DEPRECATED RDW RBC AUTO: 39.7 FL (ref 37–54)
ERYTHROCYTE [DISTWIDTH] IN BLOOD BY AUTOMATED COUNT: 13.2 % (ref 12.3–15.4)
GFR SERPL CREATININE-BSD FRML MDRD: 63 ML/MIN/1.73
GLUCOSE BLDC GLUCOMTR-MCNC: 114 MG/DL (ref 70–130)
GLUCOSE BLDC GLUCOMTR-MCNC: 379 MG/DL (ref 70–130)
GLUCOSE SERPL-MCNC: 131 MG/DL (ref 65–99)
GLUCOSE UR STRIP-MCNC: NEGATIVE MG/DL
HCT VFR BLD AUTO: 41.5 % (ref 34–46.6)
HGB BLD-MCNC: 13.9 G/DL (ref 12–15.9)
HGB UR QL STRIP.AUTO: NEGATIVE
HYALINE CASTS UR QL AUTO: ABNORMAL /LPF
KETONES UR QL STRIP: NEGATIVE
LEUKOCYTE ESTERASE UR QL STRIP.AUTO: ABNORMAL
MCH RBC QN AUTO: 27.8 PG (ref 26.6–33)
MCHC RBC AUTO-ENTMCNC: 33.5 G/DL (ref 31.5–35.7)
MCV RBC AUTO: 83 FL (ref 79–97)
NITRITE UR QL STRIP: NEGATIVE
PH UR STRIP.AUTO: 5.5 [PH] (ref 5–8)
PLATELET # BLD AUTO: 179 10*3/MM3 (ref 140–450)
PMV BLD AUTO: 9.9 FL (ref 6–12)
POTASSIUM SERPL-SCNC: 3.6 MMOL/L (ref 3.5–5.2)
PROT UR QL STRIP: NEGATIVE
RBC # BLD AUTO: 5 10*6/MM3 (ref 3.77–5.28)
RBC # UR: ABNORMAL /HPF
REF LAB TEST METHOD: ABNORMAL
SARS-COV-2 ORF1AB RESP QL NAA+PROBE: NOT DETECTED
SODIUM SERPL-SCNC: 142 MMOL/L (ref 136–145)
SP GR UR STRIP: 1.01 (ref 1–1.03)
SQUAMOUS #/AREA URNS HPF: ABNORMAL /HPF
UROBILINOGEN UR QL STRIP: ABNORMAL
WBC # BLD AUTO: 7.31 10*3/MM3 (ref 3.4–10.8)
WBC UR QL AUTO: ABNORMAL /HPF

## 2021-06-02 PROCEDURE — 25010000002 HYDROMORPHONE PER 4 MG: Performed by: UROLOGY

## 2021-06-02 PROCEDURE — 25010000002 ONDANSETRON PER 1 MG: Performed by: UROLOGY

## 2021-06-02 PROCEDURE — 82962 GLUCOSE BLOOD TEST: CPT

## 2021-06-02 PROCEDURE — 81001 URINALYSIS AUTO W/SCOPE: CPT | Performed by: UROLOGY

## 2021-06-02 PROCEDURE — 25010000002 HYDROMORPHONE PER 4 MG: Performed by: HOSPITALIST

## 2021-06-02 PROCEDURE — G0378 HOSPITAL OBSERVATION PER HR: HCPCS

## 2021-06-02 PROCEDURE — 96376 TX/PRO/DX INJ SAME DRUG ADON: CPT

## 2021-06-02 PROCEDURE — 25010000002 DEXAMETHASONE PER 1 MG: Performed by: ANESTHESIOLOGY

## 2021-06-02 PROCEDURE — 74018 RADEX ABDOMEN 1 VIEW: CPT

## 2021-06-02 PROCEDURE — 25010000002 ONDANSETRON PER 1 MG: Performed by: ANESTHESIOLOGY

## 2021-06-02 PROCEDURE — 25010000002 ONDANSETRON PER 1 MG: Performed by: HOSPITALIST

## 2021-06-02 PROCEDURE — 80048 BASIC METABOLIC PNL TOTAL CA: CPT | Performed by: HOSPITALIST

## 2021-06-02 PROCEDURE — 25010000002 PROPOFOL 10 MG/ML EMULSION: Performed by: ANESTHESIOLOGY

## 2021-06-02 PROCEDURE — 85027 COMPLETE CBC AUTOMATED: CPT | Performed by: HOSPITALIST

## 2021-06-02 PROCEDURE — 25010000002 FENTANYL CITRATE (PF) 50 MCG/ML SOLUTION: Performed by: ANESTHESIOLOGY

## 2021-06-02 RX ORDER — ARIPIPRAZOLE 5 MG/1
5 TABLET ORAL DAILY
Status: DISCONTINUED | OUTPATIENT
Start: 2021-06-02 | End: 2021-06-03 | Stop reason: HOSPADM

## 2021-06-02 RX ORDER — PROMETHAZINE HYDROCHLORIDE 25 MG/1
25 TABLET ORAL ONCE AS NEEDED
Status: DISCONTINUED | OUTPATIENT
Start: 2021-06-02 | End: 2021-06-03

## 2021-06-02 RX ORDER — DEXAMETHASONE SODIUM PHOSPHATE 10 MG/ML
INJECTION INTRAMUSCULAR; INTRAVENOUS AS NEEDED
Status: DISCONTINUED | OUTPATIENT
Start: 2021-06-02 | End: 2021-06-02 | Stop reason: SURG

## 2021-06-02 RX ORDER — NALOXONE HCL 0.4 MG/ML
0.2 VIAL (ML) INJECTION AS NEEDED
Status: DISCONTINUED | OUTPATIENT
Start: 2021-06-02 | End: 2021-06-03

## 2021-06-02 RX ORDER — ONDANSETRON 2 MG/ML
INJECTION INTRAMUSCULAR; INTRAVENOUS AS NEEDED
Status: DISCONTINUED | OUTPATIENT
Start: 2021-06-02 | End: 2021-06-02 | Stop reason: SURG

## 2021-06-02 RX ORDER — INSULIN LISPRO 100 [IU]/ML
0-7 INJECTION, SOLUTION INTRAVENOUS; SUBCUTANEOUS
Status: DISCONTINUED | OUTPATIENT
Start: 2021-06-02 | End: 2021-06-03

## 2021-06-02 RX ORDER — MIDAZOLAM HYDROCHLORIDE 1 MG/ML
1 INJECTION INTRAMUSCULAR; INTRAVENOUS
Status: DISCONTINUED | OUTPATIENT
Start: 2021-06-02 | End: 2021-06-02 | Stop reason: HOSPADM

## 2021-06-02 RX ORDER — SODIUM CHLORIDE 0.9 % (FLUSH) 0.9 %
3 SYRINGE (ML) INJECTION EVERY 12 HOURS SCHEDULED
Status: DISCONTINUED | OUTPATIENT
Start: 2021-06-02 | End: 2021-06-02 | Stop reason: HOSPADM

## 2021-06-02 RX ORDER — PANTOPRAZOLE SODIUM 40 MG/1
40 TABLET, DELAYED RELEASE ORAL EVERY MORNING
Status: DISCONTINUED | OUTPATIENT
Start: 2021-06-02 | End: 2021-06-03 | Stop reason: HOSPADM

## 2021-06-02 RX ORDER — EPHEDRINE SULFATE 50 MG/ML
5 INJECTION, SOLUTION INTRAVENOUS ONCE AS NEEDED
Status: DISCONTINUED | OUTPATIENT
Start: 2021-06-02 | End: 2021-06-02 | Stop reason: HOSPADM

## 2021-06-02 RX ORDER — LIDOCAINE HYDROCHLORIDE 10 MG/ML
0.5 INJECTION, SOLUTION EPIDURAL; INFILTRATION; INTRACAUDAL; PERINEURAL ONCE AS NEEDED
Status: DISCONTINUED | OUTPATIENT
Start: 2021-06-02 | End: 2021-06-02 | Stop reason: HOSPADM

## 2021-06-02 RX ORDER — OXYCODONE AND ACETAMINOPHEN 10; 325 MG/1; MG/1
1 TABLET ORAL EVERY 4 HOURS PRN
Status: DISCONTINUED | OUTPATIENT
Start: 2021-06-02 | End: 2021-06-03

## 2021-06-02 RX ORDER — FENTANYL CITRATE 50 UG/ML
INJECTION, SOLUTION INTRAMUSCULAR; INTRAVENOUS AS NEEDED
Status: DISCONTINUED | OUTPATIENT
Start: 2021-06-02 | End: 2021-06-02 | Stop reason: SURG

## 2021-06-02 RX ORDER — FLUMAZENIL 0.1 MG/ML
0.2 INJECTION INTRAVENOUS AS NEEDED
Status: DISCONTINUED | OUTPATIENT
Start: 2021-06-02 | End: 2021-06-02 | Stop reason: HOSPADM

## 2021-06-02 RX ORDER — HYDROCODONE BITARTRATE AND ACETAMINOPHEN 7.5; 325 MG/1; MG/1
1 TABLET ORAL ONCE AS NEEDED
Status: DISCONTINUED | OUTPATIENT
Start: 2021-06-02 | End: 2021-06-03

## 2021-06-02 RX ORDER — TRAZODONE HYDROCHLORIDE 50 MG/1
50 TABLET ORAL NIGHTLY
Status: DISCONTINUED | OUTPATIENT
Start: 2021-06-02 | End: 2021-06-03 | Stop reason: HOSPADM

## 2021-06-02 RX ORDER — SODIUM CHLORIDE, SODIUM LACTATE, POTASSIUM CHLORIDE, CALCIUM CHLORIDE 600; 310; 30; 20 MG/100ML; MG/100ML; MG/100ML; MG/100ML
9 INJECTION, SOLUTION INTRAVENOUS CONTINUOUS
Status: DISCONTINUED | OUTPATIENT
Start: 2021-06-02 | End: 2021-06-02

## 2021-06-02 RX ORDER — LABETALOL HYDROCHLORIDE 5 MG/ML
5 INJECTION, SOLUTION INTRAVENOUS
Status: DISCONTINUED | OUTPATIENT
Start: 2021-06-02 | End: 2021-06-03

## 2021-06-02 RX ORDER — ONDANSETRON 2 MG/ML
4 INJECTION INTRAMUSCULAR; INTRAVENOUS ONCE AS NEEDED
Status: DISCONTINUED | OUTPATIENT
Start: 2021-06-02 | End: 2021-06-03

## 2021-06-02 RX ORDER — PROPOFOL 10 MG/ML
VIAL (ML) INTRAVENOUS AS NEEDED
Status: DISCONTINUED | OUTPATIENT
Start: 2021-06-02 | End: 2021-06-02 | Stop reason: SURG

## 2021-06-02 RX ORDER — LIDOCAINE HYDROCHLORIDE 20 MG/ML
INJECTION, SOLUTION INFILTRATION; PERINEURAL AS NEEDED
Status: DISCONTINUED | OUTPATIENT
Start: 2021-06-02 | End: 2021-06-02 | Stop reason: SURG

## 2021-06-02 RX ORDER — DIPHENHYDRAMINE HCL 25 MG
25 CAPSULE ORAL
Status: DISCONTINUED | OUTPATIENT
Start: 2021-06-02 | End: 2021-06-02 | Stop reason: HOSPADM

## 2021-06-02 RX ORDER — DULOXETIN HYDROCHLORIDE 60 MG/1
60 CAPSULE, DELAYED RELEASE ORAL DAILY
Status: DISCONTINUED | OUTPATIENT
Start: 2021-06-02 | End: 2021-06-03 | Stop reason: HOSPADM

## 2021-06-02 RX ORDER — PROMETHAZINE HYDROCHLORIDE 25 MG/1
25 SUPPOSITORY RECTAL ONCE AS NEEDED
Status: DISCONTINUED | OUTPATIENT
Start: 2021-06-02 | End: 2021-06-03

## 2021-06-02 RX ORDER — ASPIRIN 81 MG/1
81 TABLET, CHEWABLE ORAL DAILY
Status: DISCONTINUED | OUTPATIENT
Start: 2021-06-02 | End: 2021-06-03 | Stop reason: HOSPADM

## 2021-06-02 RX ORDER — SODIUM CHLORIDE 0.9 % (FLUSH) 0.9 %
3-10 SYRINGE (ML) INJECTION AS NEEDED
Status: DISCONTINUED | OUTPATIENT
Start: 2021-06-02 | End: 2021-06-02 | Stop reason: HOSPADM

## 2021-06-02 RX ORDER — IBUPROFEN 400 MG/1
600 TABLET ORAL ONCE AS NEEDED
Status: DISCONTINUED | OUTPATIENT
Start: 2021-06-02 | End: 2021-06-03

## 2021-06-02 RX ORDER — HYDROMORPHONE HYDROCHLORIDE 1 MG/ML
0.5 INJECTION, SOLUTION INTRAMUSCULAR; INTRAVENOUS; SUBCUTANEOUS
Status: DISCONTINUED | OUTPATIENT
Start: 2021-06-02 | End: 2021-06-03 | Stop reason: SDUPTHER

## 2021-06-02 RX ORDER — DIPHENHYDRAMINE HYDROCHLORIDE 50 MG/ML
12.5 INJECTION INTRAMUSCULAR; INTRAVENOUS
Status: DISCONTINUED | OUTPATIENT
Start: 2021-06-02 | End: 2021-06-02 | Stop reason: HOSPADM

## 2021-06-02 RX ADMIN — HYDROMORPHONE HYDROCHLORIDE 0.5 MG: 1 INJECTION, SOLUTION INTRAMUSCULAR; INTRAVENOUS; SUBCUTANEOUS at 18:40

## 2021-06-02 RX ADMIN — METOPROLOL TARTRATE 50 MG: 50 TABLET, FILM COATED ORAL at 08:19

## 2021-06-02 RX ADMIN — SODIUM CHLORIDE, POTASSIUM CHLORIDE, SODIUM LACTATE AND CALCIUM CHLORIDE: 600; 310; 30; 20 INJECTION, SOLUTION INTRAVENOUS at 15:08

## 2021-06-02 RX ADMIN — PROPOFOL 160 MG: 10 INJECTION, EMULSION INTRAVENOUS at 15:08

## 2021-06-02 RX ADMIN — LIDOCAINE HYDROCHLORIDE 60 MG: 20 INJECTION, SOLUTION INFILTRATION; PERINEURAL at 15:08

## 2021-06-02 RX ADMIN — ONDANSETRON 4 MG: 2 INJECTION INTRAMUSCULAR; INTRAVENOUS at 09:43

## 2021-06-02 RX ADMIN — FENTANYL CITRATE 50 MCG: 50 INJECTION INTRAMUSCULAR; INTRAVENOUS at 15:08

## 2021-06-02 RX ADMIN — METOPROLOL TARTRATE 50 MG: 50 TABLET, FILM COATED ORAL at 20:00

## 2021-06-02 RX ADMIN — ONDANSETRON 4 MG: 2 INJECTION INTRAMUSCULAR; INTRAVENOUS at 15:34

## 2021-06-02 RX ADMIN — HYDROMORPHONE HYDROCHLORIDE 0.5 MG: 1 INJECTION, SOLUTION INTRAMUSCULAR; INTRAVENOUS; SUBCUTANEOUS at 09:43

## 2021-06-02 RX ADMIN — TRAZODONE HYDROCHLORIDE 50 MG: 50 TABLET ORAL at 20:00

## 2021-06-02 RX ADMIN — DEXAMETHASONE SODIUM PHOSPHATE 8 MG: 10 INJECTION INTRAMUSCULAR; INTRAVENOUS at 15:18

## 2021-06-02 RX ADMIN — OXYCODONE HYDROCHLORIDE AND ACETAMINOPHEN 1 TABLET: 10; 325 TABLET ORAL at 22:27

## 2021-06-02 NOTE — PROGRESS NOTES
Continued Stay Note  [facility]     Patient Name: Marguerite Petersen  MRN: [id number]  Today's Date: 6/2/2021    Admit Date: 6/1/2021    Discharge Plan     Row Name 06/02/21 1336       Plan    Plan  Declines skilled rehab he prefers home with HH    Plan Comments  Patient declines skilled rehab at [facility].  He plans home and prefers HH and agreeable to referrals.  Landy with [facility] declines..........................Arianna Castro RN        Discharge Codes    No documentation.             Arianna Castro RN

## 2021-06-02 NOTE — PLAN OF CARE
Goal Outcome Evaluation:  Plan of Care Reviewed With: patient  Progress: improving  Outcome Summary: VSS, on RA. Lithotripsy with cystoscopy today. currently no c/o of pain, N/V/D. BP and DM controlled. will continue to montior

## 2021-06-02 NOTE — ANESTHESIA POSTPROCEDURE EVALUATION
Patient: Marguerite Petersen    Procedure Summary     Date: 06/02/21 Room / Location:  [facility]  /  [facility]    Anesthesia Start: 1507 Anesthesia Stop: 1546    Procedure: EXTRACORPOREAL SHOCKWAVE LITHOTRIPSY CYSTOSCOPY (Right ) Diagnosis:       Renal calculi      Hematuria, microscopic      (Right renal calculus with hematuria)    Surgeons: Sidney Medeliln MD Provider: Salvatore Medellin MD    Anesthesia Type: general ASA Status: 3          Anesthesia Type: general    Vitals  Vitals Value Taken Time   /78 06/02/21 1630   Temp 36.7 °C (98 °F) 06/02/21 1545   Pulse 88 06/02/21 1638   Resp 18 06/02/21 1615   SpO2 95 % 06/02/21 1638   Vitals shown include unvalidated device data.        Post Anesthesia Care and Evaluation    Patient location during evaluation: bedside  Patient participation: complete - patient participated  Level of consciousness: awake  Pain management: adequate  Airway patency: patent  Anesthetic complications: No anesthetic complications  PONV Status: none  Cardiovascular status: acceptable  Respiratory status: acceptable  Hydration status: acceptable  Post Neuraxial Block status: Motor and sensory function returned to baseline

## 2021-06-02 NOTE — ANESTHESIA PROCEDURE NOTES
Airway  Urgency: elective    Date/Time: 6/2/2021 3:10 PM  Airway not difficult    General Information and Staff    Patient location during procedure: OR  Anesthesiologist: Salvatore Medellin MD    Indications and Patient Condition  Indications for airway management: airway protection    Preoxygenated: yes  MILS maintained throughout  Mask difficulty assessment: 1 - vent by mask    Final Airway Details  Final airway type: supraglottic airway      Successful airway: classic  Size 4    Number of attempts at approach: 1  Assessment: lips, teeth, and gum same as pre-op

## 2021-06-02 NOTE — H&P
"History and physical    Primary care physician  Dr. Crews    Chief complaint  Right flank pain    History of present illness  50-year-old white female with history of anxiety depression diet-controlled diabetes presented to [facility] multiple times with right flank pain.  Patient has been diagnosed with right kidney stone that will require lithotripsy by [facility].  Patient discharged home with pain medication antibiotics with follow-up with urology.  Patient continued to have symptoms which get worse and further evaluated by primary care doctor and directly admitted to our service as she is having increase right flank pain.  Patient denies any burning in the urination hematuria fever but have some nausea.  Patient admitted for management.    PAST MEDICAL HISTORY  • Anxiety     • Depression     • Diabetes mellitus      • Hypertension     • Panic attacks     • Kidney stones        PAST SURGICAL HISTORY              Procedure Laterality Date   •  SECTION       • CHOLECYSTECTOMY       • HYSTERECTOMY             FAMILY HISTORY  History reviewed. No pertinent family history.     SOCIAL HISTORY                 Socioeconomic History   • Marital status:        Spouse name: Not on file   • Number of children: Not on file   • Years of education: Not on file   • Highest education level: Not on file   Tobacco Use   • Smoking status: Never Smoker   • Smokeless tobacco: Never Used   Vaping Use   • Vaping Use: Never used   Substance and Sexual Activity   • Alcohol use: No   • Drug use: No   • Sexual activity: Defer         ALLERGIES  Patient has no known allergies.  Home medications reviewed     REVIEW OF SYSTEMS  All systems reviewed and negative except for those discussed in HPI.      PHYSICAL EXAM  Blood pressure 119/92, pulse 97, temperature 98.6 °F (37 °C), temperature source Oral, resp. rate 18, height 157.5 cm (62\"), weight 72.6 kg (160 lb), SpO2 95 %.    General: No acute distress.  HENT: " NCAT, PERRL, Nares patent.  Eyes: no scleral icterus.  Neck: trachea midline, no ROM limitations.  CV: regular rhythm, tachycardic rate.  Respiratory: normal effort, CTAB.  Abdomen: soft, nondistended, bowel sounds positive right CVA tenderness  Musculoskeletal: no deformity.  Neuro: alert, moves all extremities, follows commands.  Skin: warm, dry.     LAB RESULTS  Lab Results (last 24 hours)     Procedure Component Value Units Date/Time    POC Glucose Once [[id number]]  (Normal) Collected: 06/02/21 1126    Specimen: Blood Updated: 06/02/21 1128     Glucose 114 mg/dL     Urinalysis With Microscopic If Indicated (No Culture) - Urine, Catheter In/Out [[id number]]  (Abnormal) Collected: 06/02/21 0831    Specimen: Urine, Catheter In/Out Updated: 06/02/21 0846     Color, UA Yellow     Appearance, UA Clear     pH, UA 5.5     Specific Gravity, UA 1.009     Glucose, UA Negative     Ketones, UA Negative     Bilirubin, UA Negative     Blood, UA Negative     Protein, UA Negative     Leuk Esterase, UA Trace     Nitrite, UA Negative     Urobilinogen, UA 0.2 E.U./dL    Urinalysis, Microscopic Only - Urine, Catheter In/Out [[id number]]  (Abnormal) Collected: 06/02/21 0831    Specimen: Urine, Catheter In/Out Updated: 06/02/21 0846     RBC, UA 0-2 /HPF      WBC, UA 3-5 /HPF      Bacteria, UA None Seen /HPF      Squamous Epithelial Cells, UA 0-2 /HPF      Hyaline Casts, UA None Seen /LPF      Methodology Automated Microscopy    Basic Metabolic Panel [[id number]]  (Abnormal) Collected: 06/02/21 0348    Specimen: Blood Updated: 06/02/21 0555     Glucose 131 mg/dL      BUN 7 mg/dL      Creatinine 0.94 mg/dL      Sodium 142 mmol/L      Potassium 3.6 mmol/L      Chloride 108 mmol/L      CO2 23.7 mmol/L      Calcium 8.7 mg/dL      eGFR Non African Amer 63 mL/min/1.73      BUN/Creatinine Ratio 7.4     Anion Gap 10.3 mmol/L     Narrative:      GFR Normal >60  Chronic Kidney Disease <60  Kidney Failure <15      CBC (No Diff) [[id number]]  (Normal)  Collected: 06/02/21 0348    Specimen: Blood Updated: 06/02/21 0533     WBC 7.31 10*3/mm3      RBC 5.00 10*6/mm3      Hemoglobin 13.9 g/dL      Hematocrit 41.5 %      MCV 83.0 fL      MCH 27.8 pg      MCHC 33.5 g/dL      RDW 13.2 %      RDW-SD 39.7 fl      MPV 9.9 fL      Platelets 179 10*3/mm3     COVID PRE-OP / PRE-PROCEDURE SCREENING ORDER (NO ISOLATION) - Swab, Nasopharynx [[id number]]  (Normal) Collected: 06/01/21 2018    Specimen: Swab from Nasopharynx Updated: 06/02/21 0110    Narrative:      The following orders were created for panel order COVID PRE-OP / PRE-PROCEDURE SCREENING ORDER (NO ISOLATION) - Swab, Nasopharynx.  Procedure                               Abnormality         Status                     ---------                               -----------         ------                     COVID-19,APTIMA PANTHER,...[[id number]]  Normal              Final result                 Please view results for these tests on the individual orders.    COVID-19,APTIMA PANTHER,[facility], NP/OP SWAB IN UTM/VTM/SALINE TRANSPORT MEDIA,24 HR TAT - Swab, Nasopharynx [[id number]]  (Normal) Collected: 06/01/21 2018    Specimen: Swab from Nasopharynx Updated: 06/02/21 0110     COVID19 Not Detected    Narrative:      Fact sheet for providers: https://www.fda.gov/media/870257/download     Fact sheet for patients: https://www.fda.gov/media/382480/download    Test performed by RT PCR.    POC Glucose Once [[id number]]  (Normal) Collected: 06/01/21 1839    Specimen: Blood Updated: 06/01/21 1840     Glucose 123 mg/dL         Imaging Results (Last 24 Hours)     Procedure Component Value Units Date/Time    XR Abdomen KUB [[id number]] Resulted: 06/02/21 1112     Updated: 06/02/21 1119           Study Result    Narrative & Impression   CT OF THE ABDOMEN AND PELVIS WITHOUT CONTRAST     HISTORY: Right flank pain     COMPARISON: 05/22/2021     TECHNIQUE: Axial CT imaging was obtained through the abdomen and pelvis.  No IV contrast was  administered.     FINDINGS:  Images through the lung bases do not demonstrate any acute  abnormalities. The stomach and duodenum are unremarkable, as are the  adrenal glands. There is somewhat heterogeneous attenuation of the  liver. This is favored to be secondary to hepatic steatosis. The stomach  and duodenum are unremarkable. Gallbladder is surgically absent.  Calcified granulomata are noted within the spleen. Pancreas is normal.  There is no hydronephrosis. There is a 5 mm nonobstructing stone  identified within the inferior pole of the right kidney. There is a left  renal cyst. No distal ureteral or bladder stones are seen. Uterus is  surgically absent. There is colonic diverticulosis. The appendix is  normal. No acute osseous abnormalities are identified.     IMPRESSION:     1. Stable appearance to previously described 5 mm nonobstructing stone  within the inferior pole of the right kidney.           ECG 12 Lead  Component   Ref Range & Units 5/30/21 2218   QT Interval   ms 353         HEART RATE= 114  bpm  RR Interval= 524  ms  GA Interval= 142  ms  P Horizontal Axis= 20  deg  P Front Axis= 39  deg  QRSD Interval= 91  ms  QT Interval= 353  ms  QRS Axis= -83  deg  T Wave Axis= 59  deg  - ABNORMAL ECG -  Sinus tachycardia  Low voltage, precordial leads  Nonspecific T abnormalities, anterior leads  Borderline prolonged QT interval  When compared with ECG of 04-Feb-2018 15:08:47,  Significant rate increase otherwise no change               Current Facility-Administered Medications:   •  ARIPiprazole (ABILIFY) tablet 5 mg, 5 mg, Oral, Daily, Dante Alcantara MD  •  aspirin chewable tablet 81 mg, 81 mg, Oral, Daily, Dante Alcantara MD  •  citalopram (CeleXA) tablet 60 mg, 60 mg, Oral, Daily, Dante Alcantara MD  •  DULoxetine (CYMBALTA) DR capsule 60 mg, 60 mg, Oral, Daily, Dante Alcantara MD  •  HYDROmorphone (DILAUDID) injection 0.5 mg, 0.5 mg, Intravenous, Q4H PRN, Dante Alcantara MD, 0.5 mg at 06/02/21 0943  •  metoprolol  tartrate (LOPRESSOR) tablet 50 mg, 50 mg, Oral, Q12H, Satya Alcantara MD, 50 mg at 06/02/21 0819  •  ondansetron (ZOFRAN) injection 4 mg, 4 mg, Intravenous, Q6H PRN, Satya Alcantara MD, 4 mg at 06/02/21 0943  •  pantoprazole (PROTONIX) EC tablet 40 mg, 40 mg, Oral, QAM, Satya Alcantara MD  •  sodium chloride 0.9 % infusion, 75 mL/hr, Intravenous, Continuous, Satya Alcantara MD, Last Rate: 75 mL/hr at 06/01/21 2008, 75 mL/hr at 06/01/21 2008  •  traZODone (DESYREL) tablet 50 mg, 50 mg, Oral, Nightly, Satya Alcantara MD     ASSESSMENT  Right nonobstructing 5 mm kidney stone  Anxiety disorder  Depression  Hypertension  Diabetes mellitus diet-controlled  Gastroesophageal reflux disease    PLAN  Admit  IVF  Pain management  Urology consult  Adjust home medications  Stress ulcer DVT prophylaxis  Supportive care  Patient is full code  Discussed with nursing staff  Follow closely further recommendation current hospital course    SATYA ALCANTARA MD

## 2021-06-02 NOTE — OP NOTE
EXTRACORPOREAL SHOCKWAVE LITHOTRIPSY  Procedure Note    Marguerite Petersen  6/2/2021    Pre-op Diagnosis:   Right renal calculus with hematuria    Post-op Diagnosis:     Post-Op Diagnosis Codes:     * Renal calculi [N20.0]     * Hematuria, microscopic [R31.29]    Procedure(s):  EXTRACORPOREAL SHOCKWAVE LITHOTRIPSY CYSTOSCOPY    Surgeon(s):  Sidney Medellin MD    Anesthesia: General    Staff:   Circulator: Peggy Martinez RN  Scrub Person: Sailaja Paez  Vendor Representative: Ellis, Duane    Estimated Blood Loss: none    Specimens:                * No orders in the log *      Drains: * No LDAs found *    Findings: Small stone right lower pole kidney which appears to be well fragmented.  Negative cystoscopy    Complications: None apparent    Indications: This 50-year-old female microscopic hematuria stone the right lower pole kidney now presents for ESWL.  She has been having persistent right-sided abdominal pain.  Patient of Dr. Germain Palmer.    Procedure: Patient was taken the op suite given general anesthesia.  Placed in comfortable supine position Lithotron table.  Surgical timeout was performed.  Schocket was brought up the stone was visualized and was coupled.  We commenced with lithotripsy.  Her legs were frog-legged during the initiation of the procedure she was prepped and draped in a sterile fashion.  Cystoscopy was performed.  Her bladder was unremarkable with no urothelial abnormalities.  3000 shocks were given at 24 KV.  Excellent fragmentation was achieved.  No ventricular ectopy was noted.  She was woken and taken to recovery in stable condition.  No obvious bruising seen.      Sidney Medellin MD     Date: 6/2/2021  Time: 15:39 EDT

## 2021-06-02 NOTE — CONSULTS
[facility]      Patient Identification:  NAME:  Marguerite Petersen  Age:  50 y.o.   Sex:  female   :  1970   MRN:  [id number]       Chief complaint:  Right side hurts    History of present illness:      49 yo female well known to our practice, sees Dr. Palmer.  Has known 5 mm RLP stone, scheduled for right ESWL as OP.  Had pain at that time but stone non-obstructing and not etiology of pain.    Presented now to the ER with worsening nausea and right flank pain with radiation to the RLQ.  No fever or GH.  Increased frequency and urgency and light headed.    In hospital:  -Afebrile, VSs stable  -No UOP recorded overnight  -WBC 7 K  -Creat 0.94  -Repeated UAs in last month show possible UTI on clean catch, normal UA on cath specimen on 21  -CT on 21, 21 and 21 all show 5 or 6 mm RLP non obstructing stone    Was seen in her primary MDs office who counseled her to be [facility] as an IP    Asked to see    Past medical history:  Past Medical History:   Diagnosis Date   • Anxiety    • Atrial fibrillation (CMS/HCC)    • Diabetes mellitus (CMS/HCC)    • Hypertension    • Panic attacks    • Renal stone        Past surgical history:  Past Surgical History:   Procedure Laterality Date   •  SECTION     • CHOLECYSTECTOMY     • HYSTERECTOMY         Allergies:  Patient has no known allergies.    Home medications:  Medications Prior to Admission   Medication Sig Dispense Refill Last Dose   • ARIPiprazole (ABILIFY) 5 MG tablet Take 5 mg by mouth Daily.   2021 at Unknown time   • aspirin 81 MG tablet Take 81 mg by mouth Daily.   2021   • citalopram (CeleXA) 40 MG tablet Take 60 mg by mouth Daily.   2021 at Unknown time   • DULoxetine (CYMBALTA) 60 MG capsule Take 60 mg by mouth Daily.  2 2021 at Unknown time   • METOPROLOL TARTRATE PO Take 50 mg by mouth 2 (Two) Times a Day.   2021 at Unknown time   • naproxen (NAPROSYN) 250 MG  tablet Take 1 tablet by mouth 2 (Two) Times a Day As Needed (Neck Pain). 20 tablet 0 5/31/2021 at Unknown time   • omeprazole (priLOSEC) 20 MG capsule Take 20 mg by mouth Daily.   6/1/2021 at Unknown time   • ondansetron ODT (ZOFRAN-ODT) 8 MG disintegrating tablet Place 1 tablet on the tongue Every 8 (Eight) Hours As Needed for Nausea or Vomiting. 15 tablet 0 5/31/2021 at Unknown time   • promethazine (PHENERGAN) 25 MG tablet Take 1 tablet by mouth Every 6 (Six) Hours As Needed for Nausea or Vomiting. 10 tablet 0 5/31/2021 at Unknown time   • sulfamethoxazole-trimethoprim (BACTRIM DS,SEPTRA DS) 800-160 MG per tablet Take 1 tablet by mouth 2 (Two) Times a Day. 14 tablet 0 5/31/2021 at Unknown time   • SUMAtriptan (IMITREX) 6 MG/0.5ML solution injection Inject 6 mg under the skin 1 (One) Time As Needed for Migraine.   Past Month at Unknown time   • Topiramate (TROKENDI XR PO) Take 200 mg by mouth Daily.   5/31/2021 at Unknown time   • TRAZODONE HCL PO Take 50 mg by mouth Every Night.   5/31/2021 at Unknown time   • Cyanocobalamin (VITAMIN B12 PO) Take 1 tablet by mouth Daily.      • HYDROcodone-acetaminophen (NORCO) 5-325 MG per tablet Take 1 tablet by mouth Every 6 (Six) Hours As Needed for Severe Pain . 12 tablet 0    • HYDROcodone-acetaminophen (NORCO) 5-325 MG per tablet Take 1 tablet by mouth 4 (Four) Times a Day As Needed for Moderate Pain . 10 tablet 0    • HYDROcodone-acetaminophen (NORCO) 5-325 MG per tablet Take 1 tablet by mouth Every 4 (Four) Hours As Needed for Moderate Pain . 18 tablet 0    • IRON PO Take 1 tablet by mouth Daily.      • LORazepam (ATIVAN) 0.5 MG tablet Take 0.5 mg by mouth Every 8 (Eight) Hours As Needed for Anxiety.      • oxyCODONE-acetaminophen (PERCOCET) 5-325 MG per tablet Take 1-2 tablets by mouth Every 6 (Six) Hours As Needed (pain). 12 tablet 0         Hospital medications:  metoprolol tartrate, 50 mg, Oral, Q12H      sodium chloride, 75 mL/hr, Last Rate: 75 mL/hr (06/01/21  )      •  HYDROmorphone  •  ondansetron    Family history:  History reviewed. No pertinent family history.    Social history:  Social History     Tobacco Use   • Smoking status: Never Smoker   • Smokeless tobacco: Never Used   Vaping Use   • Vaping Use: Never used   Substance Use Topics   • Alcohol use: No   • Drug use: No       Review of systems:      Positive for:  nothing  Negative for:  Chest pain, cough, sob, o/w neg    Objective:  TMax 24 hours:   Temp (24hrs), Av.1 °F (36.7 °C), Min:97.6 °F (36.4 °C), Max:98.8 °F (37.1 °C)      Vitals Ranges:   Temp:  [97.6 °F (36.4 °C)-98.8 °F (37.1 °C)] 97.8 °F (36.6 °C)  Heart Rate:  [] 91  Resp:  [16-18] 16  BP: (115-120)/(78-89) 115/78    Intake/Output Last 3 shifts:  No intake/output data recorded.     Physical Exam:    General Appearance:    Alert, conversational, NAD   HEENT:    No trauma, pupils reactive, hearing intact   Lungs:     Respirations unlabored, no wheezing    Heart:    RRR, intact peripheral pulses   Abdomen:     Soft, ND   :    Pelvic not performed, bladder nondistended and nontender   Skin:   No bleeding, bruising or rashes   Neuro/Psych:   Orientation intact, answers questions       Results review:   I reviewed the patient's new clinical results.    Data review:  Lab Results (last 24 hours)     Procedure Component Value Units Date/Time    Basic Metabolic Panel [[id number]]  (Abnormal) Collected: 21    Specimen: Blood Updated: 21     Glucose 131 mg/dL      BUN 7 mg/dL      Creatinine 0.94 mg/dL      Sodium 142 mmol/L      Potassium 3.6 mmol/L      Chloride 108 mmol/L      CO2 23.7 mmol/L      Calcium 8.7 mg/dL      eGFR Non African Amer 63 mL/min/1.73      BUN/Creatinine Ratio 7.4     Anion Gap 10.3 mmol/L     Narrative:      GFR Normal >60  Chronic Kidney Disease <60  Kidney Failure <15      CBC (No Diff) [[id number]]  (Normal) Collected: 21    Specimen: Blood Updated: 21     WBC 7.31 10*3/mm3       RBC 5.00 10*6/mm3      Hemoglobin 13.9 g/dL      Hematocrit 41.5 %      MCV 83.0 fL      MCH 27.8 pg      MCHC 33.5 g/dL      RDW 13.2 %      RDW-SD 39.7 fl      MPV 9.9 fL      Platelets 179 10*3/mm3     COVID PRE-OP / PRE-PROCEDURE SCREENING ORDER (NO ISOLATION) - Swab, Nasopharynx [[id number]]  (Normal) Collected: 06/01/21 2018    Specimen: Swab from Nasopharynx Updated: 06/02/21 0110    Narrative:      The following orders were created for panel order COVID PRE-OP / PRE-PROCEDURE SCREENING ORDER (NO ISOLATION) - Swab, Nasopharynx.  Procedure                               Abnormality         Status                     ---------                               -----------         ------                     COVID-19,APTIMA PANTHER,...[[id number]]  Normal              Final result                 Please view results for these tests on the individual orders.    COVID-19,APTIMA PANTHER,[facility], NP/OP SWAB IN UTM/VTM/SALINE TRANSPORT MEDIA,24 HR TAT - Swab, Nasopharynx [[id number]]  (Normal) Collected: 06/01/21 2018    Specimen: Swab from Nasopharynx Updated: 06/02/21 0110     COVID19 Not Detected    Narrative:      Fact sheet for providers: https://www.fda.gov/media/050987/download     Fact sheet for patients: https://www.fda.gov/media/398643/download    Test performed by RT PCR.    POC Glucose Once [[id number]]  (Normal) Collected: 06/01/21 1839    Specimen: Blood Updated: 06/01/21 1840     Glucose 123 mg/dL            Imaging:  Imaging Results (Last 24 Hours)     ** No results found for the last 24 hours. **             Assessment:       * No active hospital problems. *    Right renal stone  Dysuria and frequency    Plan:     Check cath UA - treat if evidence of UTI  No indication for urgent stone surgery - stone not cause of any symptoms based on CT 3 days ago - patient informed of this fact at time of OV as well but remains concerned her symptoms are related to the non-obstructing stone  Check KUB  Will attempt  to add on for ESWL based on availability - keep NPO  RBO explained, discussed    Sidney Onofre MD  06/02/21  06:06 EDT

## 2021-06-02 NOTE — PLAN OF CARE
Goal Outcome Evaluation:  Plan of Care Reviewed With: patient  Progress: improving  Outcome Summary: pt here for right kidney stones. she had right flank pain, diluadid given prn. urology consult placed. NPO since midnight. IV fluids running. waiting for Ahmed to put in more admission orders. Labs this AM. VSS. room air. blood pressure stable with home metoprolol given. will ctm.

## 2021-06-02 NOTE — ANESTHESIA PREPROCEDURE EVALUATION
Anesthesia Evaluation     NPO Solid Status: > 8 hours  NPO Liquid Status: > 2 hours           Airway   Mallampati: II  Dental    (+) upper dentures and lower dentures    Pulmonary    Cardiovascular     (+) hypertension, dysrhythmias,       Neuro/Psych  (+) psychiatric history Anxiety,     GI/Hepatic/Renal/Endo    (+)   renal disease stones, diabetes mellitus,     Musculoskeletal     Abdominal    Substance History      OB/GYN          Other                        Anesthesia Plan    ASA 3     general     intravenous induction     Anesthetic plan, all risks, benefits, and alternatives have been provided, discussed and informed consent has been obtained with: patient.

## 2021-06-03 VITALS
HEIGHT: 62 IN | OXYGEN SATURATION: 97 % | HEART RATE: 87 BPM | RESPIRATION RATE: 14 BRPM | DIASTOLIC BLOOD PRESSURE: 70 MMHG | SYSTOLIC BLOOD PRESSURE: 101 MMHG | TEMPERATURE: 97.6 F | WEIGHT: 158.07 LBS | BODY MASS INDEX: 29.09 KG/M2

## 2021-06-03 LAB
ALBUMIN SERPL-MCNC: 3.6 G/DL (ref 3.5–5.2)
ALBUMIN/GLOB SERPL: 1.3 G/DL
ALP SERPL-CCNC: 138 U/L (ref 39–117)
ALT SERPL W P-5'-P-CCNC: 24 U/L (ref 1–33)
ANION GAP SERPL CALCULATED.3IONS-SCNC: 9.8 MMOL/L (ref 5–15)
AST SERPL-CCNC: 32 U/L (ref 1–32)
BASOPHILS # BLD AUTO: 0.02 10*3/MM3 (ref 0–0.2)
BASOPHILS NFR BLD AUTO: 0.2 % (ref 0–1.5)
BILIRUB SERPL-MCNC: 0.3 MG/DL (ref 0–1.2)
BUN SERPL-MCNC: 13 MG/DL (ref 6–20)
BUN/CREAT SERPL: 14 (ref 7–25)
CALCIUM SPEC-SCNC: 9.1 MG/DL (ref 8.6–10.5)
CHLORIDE SERPL-SCNC: 107 MMOL/L (ref 98–107)
CHOLEST SERPL-MCNC: 155 MG/DL (ref 0–200)
CO2 SERPL-SCNC: 22.2 MMOL/L (ref 22–29)
CREAT SERPL-MCNC: 0.93 MG/DL (ref 0.57–1)
DEPRECATED RDW RBC AUTO: 37 FL (ref 37–54)
EOSINOPHIL # BLD AUTO: 0.01 10*3/MM3 (ref 0–0.4)
EOSINOPHIL NFR BLD AUTO: 0.1 % (ref 0.3–6.2)
ERYTHROCYTE [DISTWIDTH] IN BLOOD BY AUTOMATED COUNT: 12.8 % (ref 12.3–15.4)
GFR SERPL CREATININE-BSD FRML MDRD: 63 ML/MIN/1.73
GLOBULIN UR ELPH-MCNC: 2.7 GM/DL
GLUCOSE BLDC GLUCOMTR-MCNC: 210 MG/DL (ref 70–130)
GLUCOSE BLDC GLUCOMTR-MCNC: 239 MG/DL (ref 70–130)
GLUCOSE SERPL-MCNC: 276 MG/DL (ref 65–99)
HBA1C MFR BLD: 8 % (ref 4.8–5.6)
HCT VFR BLD AUTO: 39.6 % (ref 34–46.6)
HDLC SERPL-MCNC: 35 MG/DL (ref 40–60)
HGB BLD-MCNC: 13.5 G/DL (ref 12–15.9)
IMM GRANULOCYTES # BLD AUTO: 0.07 10*3/MM3 (ref 0–0.05)
IMM GRANULOCYTES NFR BLD AUTO: 0.7 % (ref 0–0.5)
LDLC SERPL CALC-MCNC: 95 MG/DL (ref 0–100)
LDLC/HDLC SERPL: 2.62 {RATIO}
LYMPHOCYTES # BLD AUTO: 1.08 10*3/MM3 (ref 0.7–3.1)
LYMPHOCYTES NFR BLD AUTO: 11.3 % (ref 19.6–45.3)
MCH RBC QN AUTO: 27.4 PG (ref 26.6–33)
MCHC RBC AUTO-ENTMCNC: 34.1 G/DL (ref 31.5–35.7)
MCV RBC AUTO: 80.3 FL (ref 79–97)
MONOCYTES # BLD AUTO: 0.41 10*3/MM3 (ref 0.1–0.9)
MONOCYTES NFR BLD AUTO: 4.3 % (ref 5–12)
NEUTROPHILS NFR BLD AUTO: 7.96 10*3/MM3 (ref 1.7–7)
NEUTROPHILS NFR BLD AUTO: 83.4 % (ref 42.7–76)
NRBC BLD AUTO-RTO: 0 /100 WBC (ref 0–0.2)
NT-PROBNP SERPL-MCNC: 272.8 PG/ML (ref 0–900)
PLATELET # BLD AUTO: 211 10*3/MM3 (ref 140–450)
PMV BLD AUTO: 9.6 FL (ref 6–12)
POTASSIUM SERPL-SCNC: 4.3 MMOL/L (ref 3.5–5.2)
PROT SERPL-MCNC: 6.3 G/DL (ref 6–8.5)
RBC # BLD AUTO: 4.93 10*6/MM3 (ref 3.77–5.28)
SODIUM SERPL-SCNC: 139 MMOL/L (ref 136–145)
TRIGL SERPL-MCNC: 141 MG/DL (ref 0–150)
TSH SERPL DL<=0.05 MIU/L-ACNC: 0.28 UIU/ML (ref 0.27–4.2)
VLDLC SERPL-MCNC: 25 MG/DL (ref 5–40)
WBC # BLD AUTO: 9.55 10*3/MM3 (ref 3.4–10.8)

## 2021-06-03 PROCEDURE — 85025 COMPLETE CBC W/AUTO DIFF WBC: CPT | Performed by: UROLOGY

## 2021-06-03 PROCEDURE — 80053 COMPREHEN METABOLIC PANEL: CPT | Performed by: UROLOGY

## 2021-06-03 PROCEDURE — 84443 ASSAY THYROID STIM HORMONE: CPT | Performed by: UROLOGY

## 2021-06-03 PROCEDURE — 63710000001 INSULIN LISPRO (HUMAN) PER 5 UNITS: Performed by: UROLOGY

## 2021-06-03 PROCEDURE — 80061 LIPID PANEL: CPT | Performed by: UROLOGY

## 2021-06-03 PROCEDURE — G0378 HOSPITAL OBSERVATION PER HR: HCPCS

## 2021-06-03 PROCEDURE — 83036 HEMOGLOBIN GLYCOSYLATED A1C: CPT | Performed by: UROLOGY

## 2021-06-03 PROCEDURE — 82962 GLUCOSE BLOOD TEST: CPT

## 2021-06-03 PROCEDURE — 83880 ASSAY OF NATRIURETIC PEPTIDE: CPT | Performed by: UROLOGY

## 2021-06-03 RX ADMIN — PANTOPRAZOLE SODIUM 40 MG: 40 TABLET, DELAYED RELEASE ORAL at 06:07

## 2021-06-03 RX ADMIN — INSULIN LISPRO 3 UNITS: 100 INJECTION, SOLUTION INTRAVENOUS; SUBCUTANEOUS at 08:06

## 2021-06-03 RX ADMIN — CITALOPRAM 60 MG: 40 TABLET, FILM COATED ORAL at 08:07

## 2021-06-03 RX ADMIN — ASPIRIN 81 MG: 81 TABLET, CHEWABLE ORAL at 08:07

## 2021-06-03 RX ADMIN — DULOXETINE HYDROCHLORIDE 60 MG: 60 CAPSULE, DELAYED RELEASE ORAL at 08:07

## 2021-06-03 RX ADMIN — ARIPIPRAZOLE 5 MG: 5 TABLET ORAL at 08:07

## 2021-06-03 RX ADMIN — INSULIN LISPRO 3 UNITS: 100 INJECTION, SOLUTION INTRAVENOUS; SUBCUTANEOUS at 11:41

## 2021-06-03 RX ADMIN — METOPROLOL TARTRATE 50 MG: 50 TABLET, FILM COATED ORAL at 08:07

## 2021-06-03 RX ADMIN — OXYCODONE HYDROCHLORIDE AND ACETAMINOPHEN 1 TABLET: 10; 325 TABLET ORAL at 03:50

## 2021-06-03 RX ADMIN — SODIUM CHLORIDE 75 ML/HR: 9 INJECTION, SOLUTION INTRAVENOUS at 03:44

## 2021-06-03 NOTE — DISCHARGE SUMMARY
Discharge summary    Date of admission 6/1/2021  Date of discharge 6/3/2021                     Final diagnosis  Right nonobstructing 5 mm kidney stone s/p extra corporeal shockwave lithotripsy   Anxiety disorder  Depression  Hypertension  Diabetes mellitus   Gastroesophageal reflux disease    Discharge medications    Current Facility-Administered Medications:   •  ARIPiprazole (ABILIFY) tablet 5 mg, 5 mg, Oral, Daily, Sidney Medellin MD, 5 mg at 06/03/21 0807  •  aspirin chewable tablet 81 mg, 81 mg, Oral, Daily, Sidney Medellin MD, 81 mg at 06/03/21 0807  •  citalopram (CeleXA) tablet 60 mg, 60 mg, Oral, Daily, Sidney Medellin MD, 60 mg at 06/03/21 0807  •  DULoxetine (CYMBALTA) DR capsule 60 mg, 60 mg, Oral, Daily, Sidney Medellin MD, 60 mg at 06/03/21 0807  •  metoprolol tartrate (LOPRESSOR) tablet 50 mg, 50 mg, Oral, Q12H, Sidney Medellin MD, 50 mg at 06/03/21 0807  •  pantoprazole (PROTONIX) EC tablet 40 mg, 40 mg, Oral, QAM, Sidney Medellin MD, 40 mg at 06/03/21 0607  •  traZODone (DESYREL) tablet 50 mg, 50 mg, Oral, Nightly, Sidney Medellin MD, 50 mg at 06/02/21 2000     Consults obtained  Urology    Procedures  Extracorporeal shockwave lithotripsy with cystoscopy    Hospital course  51-year-old white female with history of diabetes anxiety depression migraine headache admitted through primary care doctor office with right flank pain.  Patient has multiple ER visits and found to have right nonobstructing 5 mm kidney stone.  Patient admitted treated with supportive care including IV fluid pain medication and urology consult obtained.  Patient underwent right extracorporeal shockwave lithotripsy with cystoscopy and post surgery she is doing 100% better.  Patient has no more flank pain tolerating diet walking all over wants to go home.  Patient clear for discharge    Discharge diet regular    Activity as tolerated    Medication as above    Follow-up with primary doctor  in 1 week and follow-up with urology per the instruction and take medication as directed    SATYA COATES MD

## 2021-06-03 NOTE — PROGRESS NOTES
"Renal progress note    Chief complaint  S/p lithotripsy and cystoscopy  Doing better  No new complaints  Wants to go home    History of present illness  50-year-old white female with history of anxiety depression diet-controlled diabetes presented to [facility] multiple times with right flank pain.  Patient has been diagnosed with right kidney stone that will require lithotripsy by first urology.  Patient discharged home with pain medication antibiotics with follow-up with urology.  Patient continued to have symptoms which get worse and further evaluated by primary care doctor and directly admitted to our service as she is having increase right flank pain.  Patient denies any burning in the urination hematuria fever but have some nausea.  Patient admitted for management.    REVIEW OF SYSTEMS  All systems reviewed and negative except for those discussed in HPI.      PHYSICAL EXAM  Blood pressure 101/70, pulse 87, temperature 97.6 °F (36.4 °C), temperature source Oral, resp. rate 14, height 157.5 cm (62.01\"), weight 71.7 kg (158 lb 1.1 oz), SpO2 97 %.    General: No acute distress.  HENT: NCAT, PERRL, Nares patent.  Eyes: no scleral icterus.  Neck: trachea midline, no ROM limitations.  CV: regular rhythm, tachycardic rate.  Respiratory: normal effort, CTAB.  Abdomen: soft, nondistended, bowel sounds positive right CVA tenderness  Musculoskeletal: no deformity.  Neuro: alert, moves all extremities, follows commands.  Skin: warm, dry.     LAB RESULTS  Lab Results (last 24 hours)     Procedure Component Value Units Date/Time    POC Glucose Once [[id number]]  (Abnormal) Collected: 06/03/21 1134    Specimen: Blood Updated: 06/03/21 1137     Glucose 239 mg/dL     POC Glucose Once [[id number]]  (Abnormal) Collected: 06/03/21 0639    Specimen: Blood Updated: 06/03/21 0642     Glucose 210 mg/dL     BNP [[id number]]  (Normal) Collected: 06/03/21 0345    Specimen: Blood Updated: 06/03/21 0517     proBNP 272.8 pg/mL     " Narrative:      Among patients with dyspnea, NT-proBNP is highly sensitive for the detection of acute congestive heart failure. In addition NT-proBNP of <300 pg/ml effectively rules out acute congestive heart failure with 99% negative predictive value.    Results may be falsely decreased if patient taking Biotin.      TSH [[id number]]  (Normal) Collected: 06/03/21 0345    Specimen: Blood Updated: 06/03/21 0517     TSH 0.279 uIU/mL     Comprehensive Metabolic Panel [[id number]]  (Abnormal) Collected: 06/03/21 0345    Specimen: Blood Updated: 06/03/21 0512     Glucose 276 mg/dL      BUN 13 mg/dL      Creatinine 0.93 mg/dL      Sodium 139 mmol/L      Potassium 4.3 mmol/L      Comment: Slight hemolysis detected by analyzer. Results may be affected.        Chloride 107 mmol/L      CO2 22.2 mmol/L      Calcium 9.1 mg/dL      Total Protein 6.3 g/dL      Albumin 3.60 g/dL      ALT (SGPT) 24 U/L      AST (SGOT) 32 U/L      Alkaline Phosphatase 138 U/L      Total Bilirubin 0.3 mg/dL      eGFR Non African Amer 63 mL/min/1.73      Globulin 2.7 gm/dL      A/G Ratio 1.3 g/dL      BUN/Creatinine Ratio 14.0     Anion Gap 9.8 mmol/L     Narrative:      GFR Normal >60  Chronic Kidney Disease <60  Kidney Failure <15      Lipid Panel [[id number]]  (Abnormal) Collected: 06/03/21 0345    Specimen: Blood Updated: 06/03/21 0510     Total Cholesterol 155 mg/dL      Triglycerides 141 mg/dL      HDL Cholesterol 35 mg/dL      LDL Cholesterol  95 mg/dL      VLDL Cholesterol 25 mg/dL      LDL/HDL Ratio 2.62    Narrative:      Cholesterol Reference Ranges  (U.S. Department of Health and Human Services ATP III Classifications)    Desirable          <200 mg/dL  Borderline High    200-239 mg/dL  High Risk          >240 mg/dL      Triglyceride Reference Ranges  (U.S. Department of Health and Human Services ATP III Classifications)    Normal           <150 mg/dL  Borderline High  150-199 mg/dL  High             200-499 mg/dL  Very High        >500  mg/dL    HDL Reference Ranges  (U.S. Department of Health and Human Services ATP III Classifcations)    Low     <40 mg/dl (major risk factor for CHD)  High    >60 mg/dl ('negative' risk factor for CHD)        LDL Reference Ranges  (U.S. Department of Health and Human Services ATP III Classifcations)    Optimal          <100 mg/dL  Near Optimal     100-129 mg/dL  Borderline High  130-159 mg/dL  High             160-189 mg/dL  Very High        >189 mg/dL    Hemoglobin A1c [[id number]]  (Abnormal) Collected: 06/03/21 0345    Specimen: Blood Updated: 06/03/21 0440     Hemoglobin A1C 8.00 %     Narrative:      Hemoglobin A1C Ranges:    Increased Risk for Diabetes  5.7% to 6.4%  Diabetes                     >= 6.5%  Diabetic Goal                < 7.0%    CBC & Differential [[id number]]  (Abnormal) Collected: 06/03/21 0345    Specimen: Blood Updated: 06/03/21 0428    Narrative:      The following orders were created for panel order CBC & Differential.  Procedure                               Abnormality         Status                     ---------                               -----------         ------                     CBC Auto Differential[[id number]]        Abnormal            Final result                 Please view results for these tests on the individual orders.    CBC Auto Differential [[id number]]  (Abnormal) Collected: 06/03/21 0345    Specimen: Blood Updated: 06/03/21 0428     WBC 9.55 10*3/mm3      RBC 4.93 10*6/mm3      Hemoglobin 13.5 g/dL      Hematocrit 39.6 %      MCV 80.3 fL      MCH 27.4 pg      MCHC 34.1 g/dL      RDW 12.8 %      RDW-SD 37.0 fl      MPV 9.6 fL      Platelets 211 10*3/mm3      Neutrophil % 83.4 %      Lymphocyte % 11.3 %      Monocyte % 4.3 %      Eosinophil % 0.1 %      Basophil % 0.2 %      Immature Grans % 0.7 %      Neutrophils, Absolute 7.96 10*3/mm3      Lymphocytes, Absolute 1.08 10*3/mm3      Monocytes, Absolute 0.41 10*3/mm3      Eosinophils, Absolute 0.01 10*3/mm3       Basophils, Absolute 0.02 10*3/mm3      Immature Grans, Absolute 0.07 10*3/mm3      nRBC 0.0 /100 WBC     POC Glucose Once [[id number]]  (Abnormal) Collected: 06/02/21 2004    Specimen: Blood Updated: 06/02/21 2007     Glucose 379 mg/dL         Imaging Results (Last 24 Hours)     ** No results found for the last 24 hours. **           Study Result    Narrative & Impression   CT OF THE ABDOMEN AND PELVIS WITHOUT CONTRAST     HISTORY: Right flank pain     COMPARISON: 05/22/2021     TECHNIQUE: Axial CT imaging was obtained through the abdomen and pelvis.  No IV contrast was administered.     FINDINGS:  Images through the lung bases do not demonstrate any acute  abnormalities. The stomach and duodenum are unremarkable, as are the  adrenal glands. There is somewhat heterogeneous attenuation of the  liver. This is favored to be secondary to hepatic steatosis. The stomach  and duodenum are unremarkable. Gallbladder is surgically absent.  Calcified granulomata are noted within the spleen. Pancreas is normal.  There is no hydronephrosis. There is a 5 mm nonobstructing stone  identified within the inferior pole of the right kidney. There is a left  renal cyst. No distal ureteral or bladder stones are seen. Uterus is  surgically absent. There is colonic diverticulosis. The appendix is  normal. No acute osseous abnormalities are identified.     IMPRESSION:     1. Stable appearance to previously described 5 mm nonobstructing stone  within the inferior pole of the right kidney.           ECG 12 Lead  Component   Ref Range & Units 5/30/21 2218   QT Interval   ms 353         HEART RATE= 114  bpm  RR Interval= 524  ms  AZ Interval= 142  ms  P Horizontal Axis= 20  deg  P Front Axis= 39  deg  QRSD Interval= 91  ms  QT Interval= 353  ms  QRS Axis= -83  deg  T Wave Axis= 59  deg  - ABNORMAL ECG -  Sinus tachycardia  Low voltage, precordial leads  Nonspecific T abnormalities, anterior leads  Borderline prolonged QT interval  When compared  with ECG of 04-Feb-2018 15:08:47,  Significant rate increase otherwise no change               Current Facility-Administered Medications:   •  ARIPiprazole (ABILIFY) tablet 5 mg, 5 mg, Oral, Daily, Sidney Medellin MD, 5 mg at 06/03/21 0807  •  aspirin chewable tablet 81 mg, 81 mg, Oral, Daily, Sidney Medellin MD, 81 mg at 06/03/21 0807  •  citalopram (CeleXA) tablet 60 mg, 60 mg, Oral, Daily, Sidney Medellin MD, 60 mg at 06/03/21 0807  •  DULoxetine (CYMBALTA) DR capsule 60 mg, 60 mg, Oral, Daily, Sidney Medellin MD, 60 mg at 06/03/21 0807  •  HYDROcodone-acetaminophen (NORCO) 7.5-325 MG per tablet 1 tablet, 1 tablet, Oral, Once PRN, Sidney Medellin MD  •  HYDROmorphone (DILAUDID) injection 0.5 mg, 0.5 mg, Intravenous, Q4H PRN, Sidney Medellin MD, 0.5 mg at 06/02/21 1840  •  ibuprofen (ADVIL,MOTRIN) tablet 600 mg, 600 mg, Oral, Once PRN, Sidney Medellin MD  •  insulin lispro (ADMELOG) injection 0-7 Units, 0-7 Units, Subcutaneous, TID AC, Sidney Medellin MD, 3 Units at 06/03/21 1141  •  labetalol (NORMODYNE,TRANDATE) injection 5 mg, 5 mg, Intravenous, Q5 Min PRN, Sidney Medellin MD  •  metoprolol tartrate (LOPRESSOR) tablet 50 mg, 50 mg, Oral, Q12H, Sidney Medellin MD, 50 mg at 06/03/21 0807  •  naloxone (NARCAN) injection 0.2 mg, 0.2 mg, Intravenous, PRN, Sidney Medellin MD  •  ondansetron (ZOFRAN) injection 4 mg, 4 mg, Intravenous, Q6H PRN, Sidney Medellin MD, 4 mg at 06/02/21 0943  •  ondansetron (ZOFRAN) injection 4 mg, 4 mg, Intravenous, Once PRN, Sidney Medellin MD  •  oxyCODONE-acetaminophen (PERCOCET)  MG per tablet 1 tablet, 1 tablet, Oral, Q4H PRN, Sidney Medellin MD, 1 tablet at 06/03/21 0350  •  pantoprazole (PROTONIX) EC tablet 40 mg, 40 mg, Oral, QAM, Sidney Medellin MD, 40 mg at 06/03/21 0607  •  promethazine (PHENERGAN) suppository 25 mg, 25 mg, Rectal, Once PRN **OR** promethazine (PHENERGAN) tablet 25 mg,  25 mg, Oral, Once PRN, Sidney Medellin MD  •  sodium chloride 0.9 % infusion, 75 mL/hr, Intravenous, Continuous, Sidney Medellin MD, Last Rate: 75 mL/hr at 06/03/21 1141, 75 mL/hr at 06/03/21 1141  •  traZODone (DESYREL) tablet 50 mg, 50 mg, Oral, Nightly, Sidney Medellin MD, 50 mg at 06/02/21 2000     ASSESSMENT  Right nonobstructing 5 mm kidney stone s/p extra corporeal shockwave lithotripsy with cystoscopy  Anxiety disorder  Depression  Hypertension  Diabetes mellitus diet-controlled  Gastroesophageal reflux disease    PLAN  Discharge home  Discharge summary dictated    SATYA COATES MD

## 2021-06-03 NOTE — PLAN OF CARE
Goal Outcome Evaluation:  Plan of Care Reviewed With: patient  Progress: improving  Outcome Summary: VSS, on RA. Pain controlled. Urine clear yellow. plan to d/c home today. F/u urology in 1-2 weeks. will continue to monitor

## 2021-06-03 NOTE — PLAN OF CARE
Goal Outcome Evaluation:  Plan of Care Reviewed With: patient  Progress: improving  Outcome Summary: pt is status post lithotripsy via cystoscopy. she requests pain meds PRN for flank pain. pt denies pain while urinating. pt's urine is clear yellow now. denies N/V/D. iv fluids running. BP and blood sugars controlled. VSS. room air. possible discharge home today. will ctm.

## 2021-06-07 NOTE — PROGRESS NOTES
Case Management Discharge Note                Selected Continued Care - Discharged on 6/3/2021 Admission date: 6/1/2021 - Discharge disposition: Home or Self Care    Destination    No services have been selected for the patient.              Durable Medical Equipment    No services have been selected for the patient.              Dialysis/Infusion    No services have been selected for the patient.              Home Medical Care    No services have been selected for the patient.              Therapy    No services have been selected for the patient.              Community Resources    No services have been selected for the patient.                  Transportation Services  Private: Car    Final Discharge Disposition Code: 01 - home or self-care

## 2021-07-11 ENCOUNTER — HOSPITAL ENCOUNTER (EMERGENCY)
Facility: HOSPITAL | Age: 51
Discharge: HOME | End: 2021-07-11
Attending: EMERGENCY MEDICINE | Admitting: EMERGENCY MEDICINE

## 2021-07-11 VITALS
SYSTOLIC BLOOD PRESSURE: 129 MMHG | OXYGEN SATURATION: 99 % | HEIGHT: 62 IN | TEMPERATURE: 97.5 F | HEART RATE: 73 BPM | DIASTOLIC BLOOD PRESSURE: 97 MMHG | BODY MASS INDEX: 28.91 KG/M2 | RESPIRATION RATE: 18 BRPM

## 2021-07-11 DIAGNOSIS — E11.65 TYPE 2 DIABETES MELLITUS WITH HYPERGLYCEMIA, WITH LONG-TERM CURRENT USE OF INSULIN (HCC): Primary | ICD-10-CM

## 2021-07-11 DIAGNOSIS — Z79.4 TYPE 2 DIABETES MELLITUS WITH HYPERGLYCEMIA, WITH LONG-TERM CURRENT USE OF INSULIN (HCC): Primary | ICD-10-CM

## 2021-07-11 LAB
ANION GAP SERPL CALCULATED.3IONS-SCNC: 9.6 MMOL/L (ref 5–15)
BUN SERPL-MCNC: 9 MG/DL (ref 6–20)
BUN/CREAT SERPL: 10.5 (ref 7–25)
CALCIUM SPEC-SCNC: 9.2 MG/DL (ref 8.6–10.5)
CHLORIDE SERPL-SCNC: 104 MMOL/L (ref 98–107)
CO2 SERPL-SCNC: 22.4 MMOL/L (ref 22–29)
CREAT SERPL-MCNC: 0.86 MG/DL (ref 0.57–1)
GFR SERPL CREATININE-BSD FRML MDRD: 69 ML/MIN/1.73
GLUCOSE BLDC GLUCOMTR-MCNC: 260 MG/DL (ref 70–130)
GLUCOSE BLDC GLUCOMTR-MCNC: 415 MG/DL (ref 70–130)
GLUCOSE SERPL-MCNC: 341 MG/DL (ref 65–99)
POTASSIUM SERPL-SCNC: 4.1 MMOL/L (ref 3.5–5.2)
SODIUM SERPL-SCNC: 136 MMOL/L (ref 136–145)

## 2021-07-11 PROCEDURE — 99283 EMERGENCY DEPT VISIT LOW MDM: CPT

## 2021-07-11 PROCEDURE — 82962 GLUCOSE BLOOD TEST: CPT

## 2021-07-11 PROCEDURE — 80048 BASIC METABOLIC PNL TOTAL CA: CPT | Performed by: EMERGENCY MEDICINE

## 2021-07-11 RX ORDER — SODIUM CHLORIDE 0.9 % (FLUSH) 0.9 %
10 SYRINGE (ML) INJECTION AS NEEDED
Status: DISCONTINUED | OUTPATIENT
Start: 2021-07-11 | End: 2021-07-11 | Stop reason: HOSPADM

## 2021-07-11 RX ADMIN — SODIUM CHLORIDE 1000 ML: 9 INJECTION, SOLUTION INTRAVENOUS at 18:14

## 2021-07-11 NOTE — ED TRIAGE NOTES
Pt ambulatory to triage with c/o hyperglycemia. Pt is type 2 diabetic, takes victoza. Pt reports blood glucose of 472.    Pt given mask in triage, staff in PPE.

## 2021-07-11 NOTE — ED PROVIDER NOTES
EMERGENCY DEPARTMENT ENCOUNTER  Patient was placed in face mask in first look and the following protective measures were taken unless additional measures were taken and documented below in the ED course. Patient was wearing facemask when I entered the room and throughout our encounter. I wore full protective equipment throughout this patient encounter including a face mask, and gloves. Hand hygiene was performed before donning protective equipment and after removal when leaving the room.    Room Number:  15/15  Date of encounter:  2021  PCP: Marielena Crews MD    HPI:  Context: Marguerite Petersen is a 51 y.o. female who presents to the ED c/o chief complaint of hyperglycemia.  Patient states she has been on prednisone for the past 3 days for bursitis of the left shoulder.  She was feeling a little off yesterday and checked her blood sugar and was 327.  Today, patient's blood sugar was read as 472 at home.  This is after she took her days dose of Victoza.  Patient has noted polyuria and polydipsia today.  She denies nausea, vomiting, chest pain or shortness of air.  She has noted that since she is been on the prednisone she has been wanting to eat everything in sight.  She denies recent illness and denies fever, chills or body aches.    MEDICAL HISTORY REVIEW  Reviewed in EPIC    PAST MEDICAL HISTORY  Active Ambulatory Problems     Diagnosis Date Noted   • Right renal stone 2021     Resolved Ambulatory Problems     Diagnosis Date Noted   • No Resolved Ambulatory Problems     Past Medical History:   Diagnosis Date   • Anxiety    • Atrial fibrillation (CMS/HCC)    • Diabetes mellitus (CMS/HCC)    • Hypertension    • Panic attacks    • Renal stone        PAST SURGICAL HISTORY  Past Surgical History:   Procedure Laterality Date   •  SECTION     • CHOLECYSTECTOMY     • EXTRACORPOREAL SHOCK WAVE LITHOTRIPSY (ESWL) Right 2021    Procedure: EXTRACORPOREAL SHOCKWAVE LITHOTRIPSY CYSTOSCOPY;  Surgeon: Vignesh  Sidney MOLINA MD;  [facility];  Service: Urology;  Laterality: Right;   • HYSTERECTOMY         FAMILY HISTORY  No family history on file.    SOCIAL HISTORY  Social History     Socioeconomic History   • Marital status:      Spouse name: Not on file   • Number of children: Not on file   • Years of education: Not on file   • Highest education level: Not on file   Tobacco Use   • Smoking status: Never Smoker   • Smokeless tobacco: Never Used   Vaping Use   • Vaping Use: Never used   Substance and Sexual Activity   • Alcohol use: No   • Drug use: No   • Sexual activity: Defer       ALLERGIES  Patient has no known allergies.    The patient's allergies have been reviewed    REVIEW OF SYSTEMS  All systems reviewed and negative except for those discussed in HPI.     PHYSICAL EXAM  I have reviewed the triage vital signs and nursing notes.  ED Triage Vitals   Temp Heart Rate Resp BP SpO2   07/11/21 1703 07/11/21 1703 07/11/21 1703 07/11/21 1711 07/11/21 1703   97.5 °F (36.4 °C) 83 18 136/98 98 %      Temp src Heart Rate Source Patient Position BP Location FiO2 (%)   07/11/21 1703 07/11/21 1711 07/11/21 1711 07/11/21 1711 --   Tympanic Monitor Lying Right arm        General: Mild distress.  HENT: NCAT, PERRL, Nares patent.  Eyes: no scleral icterus.  Tracking movements are intact  Neck: trachea midline, no ROM limitations.  CV: regular rhythm, regular rate.  Respiratory: normal effort, CTAB.  Abdomen: soft, nondistended, NTTP, no rebound tenderness, no guarding or rigidity  : deferred.  Musculoskeletal: no deformity.  Neuro: alert, moves all extremities, follows commands.  Skin: warm, dry.    LAB RESULTS  Recent Results (from the past 24 hour(s))   POC Glucose Once    Collection Time: 07/11/21  5:03 PM    Specimen: Blood   Result Value Ref Range    Glucose 415 (C) 70 - 130 mg/dL   Basic Metabolic Panel    Collection Time: 07/11/21  6:13 PM    Specimen: Blood   Result Value Ref Range    Glucose 341 (H) 65  - 99 mg/dL    BUN 9 6 - 20 mg/dL    Creatinine 0.86 0.57 - 1.00 mg/dL    Sodium 136 136 - 145 mmol/L    Potassium 4.1 3.5 - 5.2 mmol/L    Chloride 104 98 - 107 mmol/L    CO2 22.4 22.0 - 29.0 mmol/L    Calcium 9.2 8.6 - 10.5 mg/dL    eGFR Non African Amer 69 >60 mL/min/1.73    BUN/Creatinine Ratio 10.5 7.0 - 25.0    Anion Gap 9.6 5.0 - 15.0 mmol/L   POC Glucose Once    Collection Time: 07/11/21  7:27 PM    Specimen: Blood   Result Value Ref Range    Glucose 260 (H) 70 - 130 mg/dL       I ordered the above labs and reviewed the results.    RADIOLOGY  No Radiology Exams Resulted Within Past 24 Hours    I ordered the above noted radiological studies. I reviewed the images and results. I agree with the radiologist interpretation.    PROCEDURES  Procedures    MEDICATIONS GIVEN IN ER  Medications   sodium chloride 0.9 % bolus 1,000 mL (0 mL Intravenous Stopped 7/11/21 1950)       PROGRESS, DATA ANALYSIS, CONSULTS, AND MEDICAL DECISION MAKING  A complete history and physical exam have been performed.  All available laboratory and imaging results have been reviewed by myself prior to disposition.    MDM  After the initial H&P, I discussed pertinent information from history and physical exam with patient/family.  Discussed differential diagnosis.  Discussed plan for ED evaluation/work-up/treatment.  All questions answered.  Patient/family is agreeable with plan.  ED Course as of Jul 11 2337   Sun Jul 11, 2021   1725 Patient presents with hyperglycemia after being on prednisone.  Patient states her left shoulder does not feel better so I have advised patient to stop taking prednisone.  We will give IV fluids and check her BMP.  Once I know patient's potassium and serum blood sugar I will give glucose.    [DE]   1854 I updated patient on the results of her BMP.  We are going to recheck patient's blood sugar and half an hour.  If it continues to decrease, I think it is safe to discharge patient to home without insulin.    [DE]    1928 Patient's repeat blood sugar is 260.  I think it is safe to discharge patient to home.  I have advised her to stop using the prednisone.    [DE]      ED Course User Index  [DE] Vin Ta MD       AS OF 23:37 EDT VITALS:    BP - 129/97  HR - 73  TEMP - 97.5 °F (36.4 °C) (Tympanic)  O2 SATS - 99%    DIAGNOSIS  Final diagnoses:   Type 2 diabetes mellitus with hyperglycemia, with long-term current use of insulin (CMS/[facility])         DISPOSITION     DISCHARGE    Patient discharged in stable condition.    Reviewed implications of results, diagnosis, meds, responsibility to follow up, warning signs and symptoms of possible worsening, potential complications and reasons to return to ER.    Patient/Family voiced understanding of above instructions.    Discussed plan for discharge, as there is no emergent indication for admission. Patient referred to primary care provider for BP management due to today's BP. Pt/family is agreeable and understands need for follow up and repeat testing.  Pt is aware that discharge does not mean that nothing is wrong but it indicates no emergency is present that requires admission and they must continue care with follow-up as given below or physician of their choice.     FOLLOW-UP  Marielena Crews MD  [address] [facility] [address] [phone]    Schedule an appointment as soon as possible for a visit            Medication List      No changes were made to your prescriptions during this visit.          Vin Ta MD  07/11/21 2407

## 2021-07-11 NOTE — ED NOTES
Pt reports she noticed her BG high today. Pt had recent Bursitis diagnosis and has been on prednisone for the last 3 days. Pt also complains of fatigue.      Amado Gamez RN  07/11/21 0515

## 2021-07-11 NOTE — DISCHARGE INSTRUCTIONS
Stop using the prednisone and follow-up with your family doctor.  Continue to monitor your blood sugar and take your Victoza as directed.  Please return to the emergency department symptoms worsen.

## 2021-07-21 ENCOUNTER — HOSPITAL ENCOUNTER (EMERGENCY)
Facility: HOSPITAL | Age: 51
Discharge: LEFT BEFORE DISCHARGE | End: 2021-07-21

## 2021-07-21 ENCOUNTER — APPOINTMENT (OUTPATIENT)
Dept: GENERAL RADIOLOGY | Facility: HOSPITAL | Age: 51
End: 2021-07-21

## 2021-07-21 VITALS
WEIGHT: 161 LBS | HEART RATE: 128 BPM | DIASTOLIC BLOOD PRESSURE: 80 MMHG | SYSTOLIC BLOOD PRESSURE: 104 MMHG | OXYGEN SATURATION: 99 % | RESPIRATION RATE: 22 BRPM | BODY MASS INDEX: 29.63 KG/M2 | TEMPERATURE: 98.7 F | HEIGHT: 62 IN

## 2021-07-21 PROCEDURE — 99211 OFF/OP EST MAY X REQ PHY/QHP: CPT

## 2021-07-21 PROCEDURE — 93005 ELECTROCARDIOGRAM TRACING: CPT

## 2021-07-21 PROCEDURE — 93010 ELECTROCARDIOGRAM REPORT: CPT | Performed by: INTERNAL MEDICINE

## 2021-07-21 RX ORDER — SODIUM CHLORIDE 0.9 % (FLUSH) 0.9 %
10 SYRINGE (ML) INJECTION AS NEEDED
Status: DISCONTINUED | OUTPATIENT
Start: 2021-07-21 | End: 2021-07-21 | Stop reason: HOSPADM

## 2021-07-21 RX ORDER — ASPIRIN 325 MG
325 TABLET ORAL ONCE
Status: DISCONTINUED | OUTPATIENT
Start: 2021-07-21 | End: 2021-07-21 | Stop reason: HOSPADM

## 2021-07-21 NOTE — ED NOTES
"Pt says, \"my watch has been saying that my heart rate is in the 120.\" pt reports cp that started 1 hr pta with SOA.  Pt reports that she started taking atomoxetine today.   Pt given mask in triage, staff in PPE.       Kim Dahl, RN  07/21/21 0185    "

## 2021-07-22 LAB — QT INTERVAL: 338 MS

## 2021-08-04 ENCOUNTER — HOSPITAL ENCOUNTER (EMERGENCY)
Facility: HOSPITAL | Age: 51
Discharge: HOME | End: 2021-08-04
Attending: EMERGENCY MEDICINE | Admitting: EMERGENCY MEDICINE

## 2021-08-04 VITALS
TEMPERATURE: 97.6 F | OXYGEN SATURATION: 99 % | SYSTOLIC BLOOD PRESSURE: 104 MMHG | DIASTOLIC BLOOD PRESSURE: 82 MMHG | HEART RATE: 94 BPM | RESPIRATION RATE: 15 BRPM

## 2021-08-04 DIAGNOSIS — M25.512 LEFT SHOULDER PAIN, UNSPECIFIED CHRONICITY: Primary | ICD-10-CM

## 2021-08-04 PROCEDURE — 99283 EMERGENCY DEPT VISIT LOW MDM: CPT

## 2021-08-04 RX ORDER — LIDOCAINE 50 MG/G
1 PATCH TOPICAL EVERY 24 HOURS
Qty: 15 EACH | Refills: 0 | Status: SHIPPED | OUTPATIENT
Start: 2021-08-04 | End: 2021-08-19

## 2021-08-04 RX ORDER — CYCLOBENZAPRINE HCL 10 MG
10 TABLET ORAL 2 TIMES DAILY PRN
Qty: 14 TABLET | Refills: 0 | Status: SHIPPED | OUTPATIENT
Start: 2021-08-04 | End: 2021-08-11

## 2021-08-04 RX ORDER — CYCLOBENZAPRINE HCL 10 MG
10 TABLET ORAL ONCE
Status: DISCONTINUED | OUTPATIENT
Start: 2021-08-04 | End: 2021-08-04 | Stop reason: HOSPADM

## 2021-08-04 RX ORDER — NAPROXEN 500 MG/1
500 TABLET ORAL 2 TIMES DAILY PRN
Qty: 14 TABLET | Refills: 0 | Status: SHIPPED | OUTPATIENT
Start: 2021-08-04 | End: 2021-08-11

## 2021-08-04 RX ORDER — HYDROCODONE BITARTRATE AND ACETAMINOPHEN 7.5; 325 MG/1; MG/1
1 TABLET ORAL ONCE
Status: COMPLETED | OUTPATIENT
Start: 2021-08-04 | End: 2021-08-04

## 2021-08-04 RX ORDER — LIDOCAINE 50 MG/G
1 PATCH TOPICAL ONCE
Status: DISCONTINUED | OUTPATIENT
Start: 2021-08-04 | End: 2021-08-04 | Stop reason: HOSPADM

## 2021-08-04 RX ADMIN — HYDROCODONE BITARTRATE AND ACETAMINOPHEN 1 TABLET: 7.5; 325 TABLET ORAL at 14:18

## 2021-08-04 NOTE — ED PROVIDER NOTES
MD ATTESTATION NOTE    The LYNNE and I have discussed this patient's history, physical exam, and treatment plan.  I have reviewed the documentation and personally had a face to face interaction with the patient. I affirm the documentation and agree with the treatment and plan.  The attached note describes my personal findings.      Marguerite Petersen is a 51 y.o. female who presents to the ED c/o left shoulder pain.  She reports that she has had left shoulder pain for the last 2 months.  She states it is in the posterior shoulder and worse with movement.  She has no weakness or numbness.  She reports that she saw an orthopedist and had an MRI of her shoulder.  She states she called the office to let them know that her pain was severe and they told her that her appointment is not until the sixth.  She reports no weakness or numbness.  She denies any new injury.  She reports that her shoulder started hurting after she was walking her dog and it was tugging on her arm.      On exam:  GENERAL: Awake, alert, no acute distress  SKIN: Warm, dry  HENT: Normocephalic, atraumatic  EYES: no scleral icterus  CV: regular rhythm, regular rate  RESPIRATORY: normal effort, lungs clear  ABDOMEN: soft, non-tender, non-distended  MUSCULOSKELETAL: no deformity.  Left upper extremity with strong pulses.  Normal sensation and motor.  Decreased range of motion due to pain.  No cervical tenderness.  NEURO: alert, moves all extremities, follows commands    Labs  No results found for this or any previous visit (from the past 24 hour(s)).    Radiology  No Radiology Exams Resulted Within Past 24 Hours    Medical Decision Making:       She has [facility] with [facility].  She has orthopedics follow-up in 2 days.  She has no new symptoms.  Plan to help her with pain control in the meantime.  No further imaging necessary today.    PPE: Both the patient and I wore a surgical mask throughout the entire patient encounter. I wore protective goggles.      Diagnosis  Final diagnoses:   None        Ramon Maynard MD  08/04/21 1210

## 2021-08-04 NOTE — ED PROVIDER NOTES
EMERGENCY DEPARTMENT ENCOUNTER    Room Number:  B05/05  Date seen:  2021  Time seen: 13:41 EDT  PCP: Marielena Crews MD  Historian: Patient    HPI:  Chief complaint: Shoulder pain  A complete HPI/ROS/PMH/PSH/SH/FH are unobtainable due to: None  Context:Marguerite Petersen is a 51 y.o. female, who presents to the ED with c/o waking up 2 months ago with left posterior shoulder pain, denies any injury to the shoulder.  She has seen Dr. Jones, orthopedics  and had an MRI of the left shoulder at that time.  She says that the MRI has not been read and will not know the results until  when she sees orthopedics again.  Patient denies any new injuries, but now in the ER due to worsening pain.  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  She is right-hand dominant.  Occasionally the pain radiates down her left arm.  Denies any numbness or tingling to the area.    Patient was placed in face mask in first look. Patient was wearing facemask when I entered the room and throughout our encounter. I wore full protective equipment throughout this patient encounter including a face mask, goggles, and gloves. Hand hygiene was performed before donning protective equipment and after removal when leaving the room.    MEDICAL RECORD REVIEW      ALLERGIES  Patient has no known allergies.    PAST MEDICAL HISTORY  Active Ambulatory Problems     Diagnosis Date Noted   • Right renal stone 2021     Resolved Ambulatory Problems     Diagnosis Date Noted   • No Resolved Ambulatory Problems     Past Medical History:   Diagnosis Date   • Anxiety    • Atrial fibrillation (CMS/HCC)    • Diabetes mellitus (CMS/HCC)    • Hypertension    • Panic attacks    • Renal stone        PAST SURGICAL HISTORY  Past Surgical History:   Procedure Laterality Date   •  SECTION     • CHOLECYSTECTOMY     • EXTRACORPOREAL SHOCK WAVE LITHOTRIPSY (ESWL) Right 2021    Procedure: EXTRACORPOREAL SHOCKWAVE LITHOTRIPSY CYSTOSCOPY;  Surgeon:  Sidney Medellin MD;  Location: [facility] OR [facility] [place];  Service: Urology;  Laterality: Right;   • HYSTERECTOMY         FAMILY HISTORY  History reviewed. No pertinent family history.    SOCIAL HISTORY  Social History     Socioeconomic History   • Marital status:      Spouse name: Not on file   • Number of children: Not on file   • Years of education: Not on file   • Highest education level: Not on file   Tobacco Use   • Smoking status: Never Smoker   • Smokeless tobacco: Never Used   Vaping Use   • Vaping Use: Never used   Substance and Sexual Activity   • Alcohol use: No   • Drug use: No   • Sexual activity: Defer       REVIEW OF SYSTEMS  Review of Systems    All systems reviewed and negative except for those discussed in HPI.     PHYSICAL EXAM    ED Triage Vitals   Temp Heart Rate Resp BP SpO2   08/04/21 1321 08/04/21 1321 08/04/21 1321 08/04/21 1323 08/04/21 1321   97.6 °F (36.4 °C) 94 15 104/82 99 %      Temp src Heart Rate Source Patient Position BP Location FiO2 (%)   08/04/21 1321 08/04/21 1321 08/04/21 1323 08/04/21 1323 --   Tympanic Monitor Standing Right arm      Physical Exam    I have reviewed the triage vital signs and nursing notes.      GENERAL: not distressed  HENT: nares patent  EYES: no scleral icterus  NECK: no ROM limitations, no C-spine tenderness  CV: regular rhythm, regular rate  RESPIRATORY: normal effort  ABDOMEN: soft  : deferred  MUSCULOSKELETAL: no deformity; left upper extremity with 2+ radial pulses, normal sensation to light touch, point tenderness to the left posterior shoulder, decreased range of motion secondary to pain  NEURO: alert, moves all extremities, follows commands  SKIN: warm, dry    LAB RESULTS  No results found for this or any previous visit (from the past 24 hour(s)).      RADIOLOGY RESULTS  No orders to display         PROGRESS, DATA ANALYSIS, CONSULTS AND MEDICAL DECISION MAKING  All labs have been independently reviewed by me.  All radiology studies have been  reviewed by me and discussed with radiologist dictating the report. Discussion below represents my analysis of pertinent findings related to patient's condition, differential diagnosis, treatment plan and final disposition.     ED Course as of Aug 04 1629   Wed Aug 04, 2021   1400 Patient has had left shoulder pain for 2 months.  She has establish an orthopedics doctor Dr. Jones and had an MRI done last week.  Denies any new or worsening symptoms she says that she just cannot take the pain anymore.  I doubt think is necessary to order any imaging here in the ER.  I will prescribe her muscle relaxers and lidocaine patch and emphasized the importance of following up at her regular schedule appointment with orthopedics.  She agrees with the plan of care.    [SS]      ED Course User Index  [SS] Joselin Colorado PA-C       The differential diagnosis include but are not limited to shoulder dislocation, shoulder sprain, rotator cuff tear.       Reviewed pt's history and workup with Dr. Maynard.  After a bedside evaluation, Dr. Maynard agrees with the plan of care.    (FOR DISCHARGE)The patient's history, physical exam, and lab findings were discussed with the physician, who also performed a face to face history and physical exam.  I discussed all results and noted any abnormalities with patient.  Discussed absoute need to recheck abnormalities with their family physician.  I answered any of the patient's questions.  Discussed plan for discharge, as there is no emergent indication for admission.  Pt is agreeable and understands need for follow up and repeat testing.  Pt is aware that discharge does not mean that nothing is wrong but it indicates no emergency is present and they must continue care with their family physician.  Pt is discharged with instructions to follow up with primary care doctor to have their blood pressure rechecked.       Disposition vitals:  /82 (BP Location: Right arm, Patient Position: Standing)    Pulse 94   Temp 97.6 °F (36.4 °C) (Tympanic)   Resp 15   SpO2 99%       DIAGNOSIS  Final diagnoses:   Left shoulder pain, unspecified chronicity       FOLLOW UP   Marielena Crews MD  [address] [facility] [address] [phone]    Call in 2 days      [facility] Emergency Department  [address] [facility] [address] [phone]    As needed, If symptoms worsen         Joselin Colorado PA-C  08/04/21 3536

## 2021-08-04 NOTE — ED NOTES
Pt reports left shoulder pain that radiates into her arm. Pt reports this has been going on for 2 months. Pt has had MRI but it has not been read yet. Pt states she cannot bear the pain anymore.      Sandra Mccloud RN  08/04/21 3114

## 2021-08-04 NOTE — DISCHARGE INSTRUCTIONS
Please take the prescriptions as prescribed.  Light activity for the next 3 days.  Follow-up at your regular schedule appointment with the orthopedics doctor next week.

## 2021-10-12 ENCOUNTER — HOSPITAL ENCOUNTER (EMERGENCY)
Facility: HOSPITAL | Age: 51
Discharge: HOME | End: 2021-10-12
Attending: EMERGENCY MEDICINE | Admitting: EMERGENCY MEDICINE

## 2021-10-12 ENCOUNTER — APPOINTMENT (OUTPATIENT)
Dept: GENERAL RADIOLOGY | Facility: HOSPITAL | Age: 51
End: 2021-10-12

## 2021-10-12 VITALS
RESPIRATION RATE: 16 BRPM | SYSTOLIC BLOOD PRESSURE: 111 MMHG | HEART RATE: 80 BPM | TEMPERATURE: 97.3 F | HEIGHT: 62 IN | DIASTOLIC BLOOD PRESSURE: 80 MMHG | OXYGEN SATURATION: 100 % | BODY MASS INDEX: 29.45 KG/M2

## 2021-10-12 DIAGNOSIS — R20.2 PARESTHESIA OF ARM: ICD-10-CM

## 2021-10-12 DIAGNOSIS — M25.512 ACUTE PAIN OF LEFT SHOULDER: Primary | ICD-10-CM

## 2021-10-12 PROCEDURE — 96372 THER/PROPH/DIAG INJ SC/IM: CPT

## 2021-10-12 PROCEDURE — 73030 X-RAY EXAM OF SHOULDER: CPT

## 2021-10-12 PROCEDURE — 25010000002 DEXAMETHASONE PER 1 MG: Performed by: EMERGENCY MEDICINE

## 2021-10-12 PROCEDURE — 99283 EMERGENCY DEPT VISIT LOW MDM: CPT

## 2021-10-12 RX ORDER — DEXAMETHASONE SODIUM PHOSPHATE 10 MG/ML
10 INJECTION INTRAMUSCULAR; INTRAVENOUS ONCE
Status: COMPLETED | OUTPATIENT
Start: 2021-10-12 | End: 2021-10-12

## 2021-10-12 RX ORDER — DEXAMETHASONE SODIUM PHOSPHATE 10 MG/ML
10 INJECTION INTRAMUSCULAR; INTRAVENOUS ONCE
Status: DISCONTINUED | OUTPATIENT
Start: 2021-10-12 | End: 2021-10-12

## 2021-10-12 RX ORDER — HYDROCODONE BITARTRATE AND ACETAMINOPHEN 7.5; 325 MG/1; MG/1
1 TABLET ORAL EVERY 6 HOURS PRN
Qty: 12 TABLET | Refills: 0 | Status: SHIPPED | OUTPATIENT
Start: 2021-10-12 | End: 2021-12-13 | Stop reason: HOSPADM

## 2021-10-12 RX ORDER — METHYLPREDNISOLONE 4 MG/1
TABLET ORAL
Qty: 21 EACH | Refills: 0 | Status: SHIPPED | OUTPATIENT
Start: 2021-10-12 | End: 2021-12-13 | Stop reason: HOSPADM

## 2021-10-12 RX ADMIN — DEXAMETHASONE SODIUM PHOSPHATE 10 MG: 10 INJECTION INTRAMUSCULAR; INTRAVENOUS at 21:19

## 2021-10-12 NOTE — ED TRIAGE NOTES
Pt hurt her left shoulder a couple months ago.  She was dx c tendonitis and rotator cuff injury.  she reports she has had numbness and tingling and pain in left from shoulder to arm x 2 weeks    Patient was placed in face mask during first look triage.  Patient was wearing a face mask throughout encounter.  I wore personal protective equipment throughout the encounter.  Hand hygiene was performed before and after patient encounter.

## 2021-10-13 NOTE — ED PROVIDER NOTES
" EMERGENCY DEPARTMENT ENCOUNTER    Room Number:    Date of encounter:  10/14/2021  PCP: Marielena Crews MD  Historian: *Patient      HPI:  Chief Complaint: Left shoulder pain and numbness in the arm  A complete HPI/ROS/PMH/PSH/SH/FH are unobtainable due to: Nothing    Context: Marguerite Petersen is a 51 y.o. female who presents to the ED c/o severe left shoulder pain has been going on now for better part of a month.  Patient states that she first injured it when her elderly mother fell on her and she came to the ER for evaluation.  She said that x-rays were taken and she was told that it could be a rotator cuff injury.  Patient states she was seen by an orthopedist and had \"a steroid shot\" in the shoulder which helped for a while.  She states that she is continued to have multiple injuries while caring for her elderly mother and now the pain has worsened again.  The pain is localized all throughout the shoulder and in the left interscapular region.  What is different tonight is that she also has numbness and paresthesias that are going down the left arm and hand.  It is worse with movement, she has not taken anything for it, and she says this is the worst that its been      PAST MEDICAL HISTORY  Active Ambulatory Problems     Diagnosis Date Noted   • Right renal stone 2021     Resolved Ambulatory Problems     Diagnosis Date Noted   • No Resolved Ambulatory Problems     Past Medical History:   Diagnosis Date   • Anxiety    • Atrial fibrillation (HCC)    • Diabetes mellitus (HCC)    • Hypertension    • Panic attacks    • Renal stone          PAST SURGICAL HISTORY  Past Surgical History:   Procedure Laterality Date   •  SECTION     • CHOLECYSTECTOMY     • EXTRACORPOREAL SHOCK WAVE LITHOTRIPSY (ESWL) Right 2021    Procedure: EXTRACORPOREAL SHOCKWAVE LITHOTRIPSY CYSTOSCOPY;  Surgeon: Sidney Medellin MD;  Location: [facility] OR [facility] [place];  Service: Urology;  Laterality: Right;   • HYSTERECTOMY   "         FAMILY HISTORY  History reviewed. No pertinent family history.      SOCIAL HISTORY  Social History     Socioeconomic History   • Marital status:    Tobacco Use   • Smoking status: Never Smoker   • Smokeless tobacco: Never Used   Vaping Use   • Vaping Use: Never used   Substance and Sexual Activity   • Alcohol use: No   • Drug use: No   • Sexual activity: Defer         ALLERGIES  Patient has no known allergies.        REVIEW OF SYSTEMS  Review of Systems     All systems reviewed and negative except for those discussed in HPI.       PHYSICAL EXAM    I have reviewed the triage vital signs and nursing notes.    ED Triage Vitals   Temp Heart Rate Resp BP SpO2   10/12/21 1740 10/12/21 1740 10/12/21 1740 10/12/21 1853 10/12/21 1740   97.3 °F (36.3 °C) 80 16 111/80 100 %      Temp src Heart Rate Source Patient Position BP Location FiO2 (%)   10/12/21 1740 10/12/21 1740 -- -- --   Tympanic Monitor          Physical Exam  GENERAL: Awake and alert, nontoxic-appearing.  Does appear to favor her left shoulder  HENT: nares patent, no midline neck tenderness.  EYES: no scleral icterus  CV: regular rhythm, regular rate  RESPIRATORY: normal effort  ABDOMEN: soft  MUSCULOSKELETAL: no deformity.  There are some soft tissue tenderness in the left deltoid and trapezius region along with some spasm.  There is no swelling, erythema or warmth.  There is limited range of motion both passive and active.  The arm is neurovascularly intact.  NEURO: alert, moves all extremities, follows commands  SKIN: warm, dry        LAB RESULTS  No results found for this or any previous visit (from the past 24 hour(s)).    Ordered the above labs and independently reviewed the results.        RADIOLOGY  No Radiology Exams Resulted Within Past 24 Hours    I ordered the above noted radiological studies. Reviewed by me and discussed with radiologist.  See dictation for official radiology  interpretation.      PROCEDURES    Procedures      MEDICATIONS GIVEN IN ER    Medications   dexamethasone (DECADRON) injection 10 mg (10 mg Intramuscular Given 10/12/21 2119)         PROGRESS, DATA ANALYSIS, CONSULTS, AND MEDICAL DECISION MAKING    All labs have been independently reviewed by me.  All radiology studies have been reviewed by me and discussed with radiologist dictating the report.   EKG's independently viewed and interpreted by me.  Discussion below represents my analysis of pertinent findings related to patient's condition, differential diagnosis, treatment plan and final disposition.        ED Course as of 10/14/21 0958   u Oct 14, 2021   0957 Plain film of the left shoulder shows only degenerative changes but no acute abnormalities. [DP]   0957 I have discussed with the patient that she again needs to see orthopedics.  This likely represents some impingement either emanating from the lower cervical spine or shoulder itself.  I have given her a shot of steroids and Medrol Dosepak and I think she safe for outpatient follow-up. [DP]      ED Course User Index  [DP] Salvatore Smith MD           PPE: The patient wore a surgical mask throughout the entire patient encounter. I wore an N95.    AS OF 09:58 EDT VITALS:    BP - 111/80  HR - 80  TEMP - 97.3 °F (36.3 °C) (Tympanic)  O2 SATS - 100%        DIAGNOSIS  Final diagnoses:   Acute pain of left shoulder   Paresthesia of arm         DISPOSITION  Discharge           Salvatore Smith MD  10/14/21 0958

## 2021-10-26 ENCOUNTER — OFFICE VISIT (OUTPATIENT)
Dept: ORTHOPEDIC SURGERY | Facility: CLINIC | Age: 51
End: 2021-10-26

## 2021-10-26 VITALS — WEIGHT: 155 LBS | HEIGHT: 62 IN | TEMPERATURE: 97.1 F | BODY MASS INDEX: 28.52 KG/M2

## 2021-10-26 DIAGNOSIS — M75.02 ADHESIVE CAPSULITIS OF LEFT SHOULDER: Primary | ICD-10-CM

## 2021-10-26 DIAGNOSIS — R52 PAIN: ICD-10-CM

## 2021-10-26 PROCEDURE — 72040 X-RAY EXAM NECK SPINE 2-3 VW: CPT | Performed by: NURSE PRACTITIONER

## 2021-10-26 PROCEDURE — 99214 OFFICE O/P EST MOD 30 MIN: CPT | Performed by: NURSE PRACTITIONER

## 2021-10-26 PROCEDURE — 20610 DRAIN/INJ JOINT/BURSA W/O US: CPT | Performed by: NURSE PRACTITIONER

## 2021-10-26 RX ORDER — ALPRAZOLAM 0.25 MG/1
1 TABLET ORAL 3 TIMES DAILY PRN
COMMUNITY
Start: 2021-06-30 | End: 2022-03-23 | Stop reason: SDUPTHER

## 2021-10-26 RX ORDER — MELOXICAM 15 MG/1
TABLET ORAL
Qty: 30 TABLET | Refills: 0 | Status: SHIPPED | OUTPATIENT
Start: 2021-10-26 | End: 2022-06-01 | Stop reason: SDUPTHER

## 2021-10-26 RX ADMIN — METHYLPREDNISOLONE ACETATE 80 MG: 80 INJECTION, SUSPENSION INTRA-ARTICULAR; INTRALESIONAL; INTRAMUSCULAR; SOFT TISSUE at 10:22

## 2021-10-26 NOTE — PROGRESS NOTES
[facility]  New Patient      Patient Name: Marguerite Petersen  : 1970  Primary Care Physician: Marielena Crews MD        Chief Complaint: Left shoulder pain.    HPI:   Marguerite Petersen is a 51 y.o. year old who presents today for evaluation of left shoulder pain.  Patient presents today for a second opinion on her left shoulder.  Is right-hand dominant.  In reviewing her chart she has had multiple visits to the emergency room for complaints of left shoulder and left arm pain with some intermittent numbness and tingling.  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  She says it feels like a pinched nerve.    In her multiple visits to the ER and other providers she has tried a Dosepak of steroids which gave her no relief, she is tried hydrocodone which is not helping her symptoms.  She is taken Tylenol and intermittent NSAIDs.    Pain began in July of this year, it is severe and 10 out of 10 feels like stabbing.  She has stiffness as well as clicking and popping.  She is wearing a sling but reports not really that helpful.  Comes today with prior x-rays 2 weeks ago as well as a MRI of the left shoulder from .  Unfortunately we are unable to download that MRI but I do have the report available for review.    Patiently patient is diabetic and her post recent A1c was 8 this summer.    Past Medical/Surgical, Social and Family History:  I have reviewed and/or updated pertinent history as noted in the medical record including:  Past Medical History:   Diagnosis Date   • Anxiety    • Atrial fibrillation (HCC)    • Diabetes mellitus (HCC)    • Hypertension    • Panic attacks    • Renal stone      Past Surgical History:   Procedure Laterality Date   •  SECTION     •  CHOLECYSTECTOMY     • EXTRACORPOREAL SHOCK WAVE LITHOTRIPSY (ESWL) Right 6/2/2021    Procedure: EXTRACORPOREAL SHOCKWAVE LITHOTRIPSY CYSTOSCOPY;  Surgeon: Sidney Medellin MD;  [place] [facility] [place] [facility] [place]  Service: Urology;  Laterality: Right;   • HYSTERECTOMY       Social History     Occupational History   • Not on file   Tobacco Use   • Smoking status: Never Smoker   • Smokeless tobacco: Never Used   Vaping Use   • Vaping Use: Never used   Substance and Sexual Activity   • Alcohol use: No   • Drug use: No   • Sexual activity: Defer      Social History     Social History Narrative   • Not on file     History reviewed. No pertinent family history.    Allergies: No Known Allergies    Medications:   Home Medications:  Current Outpatient Medications on File Prior to Visit   Medication Sig   • ALPRAZolam (XANAX) 0.25 MG tablet Take 1 tablet by mouth 3 (Three) Times a Day As Needed.   • aspirin 81 MG tablet Take 81 mg by mouth Daily.   • citalopram (CeleXA) 40 MG tablet Take 60 mg by mouth Daily.   • DULoxetine (CYMBALTA) 60 MG capsule Take 60 mg by mouth Daily.   • METOPROLOL TARTRATE PO Take 50 mg by mouth 2 (Two) Times a Day.   • omeprazole (priLOSEC) 20 MG capsule Take 20 mg by mouth Daily.   • ondansetron ODT (ZOFRAN-ODT) 8 MG disintegrating tablet Place 1 tablet on the tongue Every 8 (Eight) Hours As Needed for Nausea or Vomiting.   • topiramate (TOPAMAX) 200 MG tablet    • TRAZODONE HCL PO Take 50 mg by mouth Every Night.   • ARIPiprazole (ABILIFY) 5 MG tablet Take 5 mg by mouth Daily.   • HYDROcodone-acetaminophen (NORCO) 5-325 MG per tablet Take 1 tablet by mouth Every 6 (Six) Hours As Needed for Severe Pain .   • HYDROcodone-acetaminophen (NORCO) 7.5-325 MG per tablet Take 1 tablet by mouth Every 6 (Six) Hours As Needed for Moderate Pain .   • LORazepam (ATIVAN) 0.5 MG tablet Take 0.5 mg by mouth Every 8 (Eight) Hours As Needed for Anxiety.   • methylPREDNISolone (MEDROL) 4 MG dose pack Take as directed on  package instructions.   • SUMAtriptan (IMITREX) 6 MG/0.5ML solution injection Inject 6 mg under the skin 1 (One) Time As Needed for Migraine.     No current facility-administered medications on file prior to visit.         ROS:  14 point review of systems was negative except as listed in the HPI.    Physical Exam:   51 y.o. female  Body mass index is 28.35 kg/m²., 70.3 kg (155 lb)  Vitals:    10/26/21 0918   Temp: 97.1 °F (36.2 °C)     General: Alert, cooperative, appears well and in no observable distress. Appears stated age and BMI as listed above.  HEENT: Normocephalic, atraumatic on external visual inspection.  CV: No significant peripheral edema.  Respiratory: Normal respiratory effort.  Skin: Warm & well perfused; appropriate skin turgor.  Psych: Appropriate mood & affect.  Neuro: Gross sensation and motor intact in affected extremity/extremities.  Vascular: Peripheral pulses palpable in affected extremity/extremities.     MSK Exam:  LEFT Shoulder  No obvious deformity or wounds, crepitus : moderate, tenderness:globally, AROM:Flexion: 50; ER: 20; IR: lateral hip, ROM significantly limited by pain and weakness in all planes and +firm endpoint in ROM.  Passively I can get her to about 120 degrees of forward flexion, external rotation is about 30 passively.  Her cuff strength appears grossly normal with internal and external isometric testing.  I am unable to isolate the supraspinatus with Laura's test today.    RIGHT Shoulder  No deformity or wounds.  No tenderness to palpation.  Full, painless AROM.  Cuff Strength 5/5 with ER, IR & Supraspinatus testing.  Negative provocative testing.      Cervical exam is grossly normal for patient's age.  She has a little bit of discomfort with left rotation but this does not reproduce any radicular symptoms.  ROM is smooth and without pain. Elbow exam reveals no abnormality with normal painless ROM.       Radiology:    Spine: AP and Lateral view of the cervical spine shows  Mild degenerative changes otherwise no acute bony pathology, no clear evidence of listhesis. No new images were needed at the visit today.      I reviewed prior x-rays of her left shoulder which show no significant acute bony pathology.  The MRI report is available but I was unable to see the images directly.  MRI performed exp imaging suggested tendinopathy in the rotator cuff tendons as well as a questionable partial supraspinatus tear without clear evidence of full-thickness tear.    Procedure:   Patient has a history of diabetes and therefore may be at increased risk of hyperglycemia following an injection of corticosteroid. These increases in BG are usually transient and resolve within a few days. Patient was advised on the potential for hyperglycemia and encouraged to self monitor for s/sx hyperglycemia and to self monitor BG. Patient encouraged to call the office for any significant hyperglycemia and/or hyperglycemia that does not resolve within 3-5 days.       Misc. Data/Labs: N/A    Assessment & Plan:    I briefly reviewed several of the emergency room record visits.  I am not entirely sure whether the patient had an event or an injury, nonetheless her shoulder pain has persisted and worsened.  Her MRI suggest possible partial rotator cuff tear and tendinopathy but no definitive full-thickness tear.  I do not think there is anything in the shoulder to account for her intermittent numbness and tingling but certainly shoulder pathology appears evident on exam today.  We may consider working up her cervical spine as needed.    I think ultimately she has developed a frozen shoulder.  I would like to try an injection of the glenohumeral joint and have her start into physical therapy.  Discussed with her the nature of the frozen shoulder she was agreeable to try this plan.  I am also been to send her a short course of meloxicam to use just over the next couple of weeks to help get her through this injection and  get her started in therapy.    If we cannot get her better with conservative measures we might repeat her MRI although I am doubtful it would add anything else at this time.  We could consider a subacromial injection if her symptoms persist.  Ultimately if we cannot get her better would refer her to Dr. Pleitez for further evaluation and treatment as needed.    I discussed with her that I do not see any indication to refill her narcotics.  He says they have not helped that much anyway.  Anti-inflammatories and physical therapy would be the best course of treatment for her at this time.    I have asked her to very closely monitor her blood sugars and encouraged her to get those better under control.  She is at risk to develop frozen shoulder in the other extremity in addition to any other complications from chronic hyperglycemia.    Injection provided in the office today. Tolerated well with no immediate complications. Injection precautions discussed with the patient., Patient encouraged to call with questions or concerns prior to follow up.  and Will discuss with attending surgeon as needed.       ICD-10-CM ICD-9-CM   1. Adhesive capsulitis of left shoulder  M75.02 726.0   2. Pain  R52 780.96     Imaging Results (Most Recent)     Procedure Component Value Units Date/Time    XR Spine Cervical 2 or 3 View [[id number]] Resulted: 10/26/21 100     Updated: 10/26/21 1008    Impression:      Ordering physician's impression is located in the Encounter Note dated 10/26/21. X-ray performed in the DR room.          New Medications Ordered This Visit   Medications   • meloxicam (MOBIC) 15 MG tablet     Si PO Daily with food.     Dispense:  30 tablet     Refill:  0     Orders Placed This Encounter   Procedures   • Large Joint Arthrocentesis: L glenohumeral   • XR Spine Cervical 2 or 3 View   • Ambulatory Referral to Physical Therapy Evaluate and treat     Return in about 5 weeks (around 2021), or if symptoms worsen or  fail to improve, for Recheck.    SAMI Henning    Large Joint Arthrocentesis: L glenohumeral  Date/Time: 10/26/2021 10:22 AM  Consent given by: patient  Site marked: site marked  Timeout: Immediately prior to procedure a time out was called to verify the correct patient, procedure, equipment, support staff and site/side marked as required   Supporting Documentation  Indications: pain   Procedure Details  Location: shoulder - L glenohumeral  Preparation: Patient was prepped and draped in the usual sterile fashion  Needle size: 22 G  Approach: posterior  Medications administered: 80 mg methylPREDNISolone acetate 80 MG/ML; 4 mL lidocaine (cardiac)  Patient tolerance: patient tolerated the procedure well with no immediate complications        Dictated Utilizing Dragon Dictation

## 2021-10-28 RX ORDER — METHYLPREDNISOLONE ACETATE 80 MG/ML
80 INJECTION, SUSPENSION INTRA-ARTICULAR; INTRALESIONAL; INTRAMUSCULAR; SOFT TISSUE
Status: COMPLETED | OUTPATIENT
Start: 2021-10-26 | End: 2021-10-26

## 2021-11-30 ENCOUNTER — OFFICE VISIT (OUTPATIENT)
Dept: ORTHOPEDIC SURGERY | Facility: CLINIC | Age: 51
End: 2021-11-30

## 2021-11-30 VITALS — TEMPERATURE: 97.5 F | BODY MASS INDEX: 28.16 KG/M2 | HEIGHT: 62 IN | WEIGHT: 153 LBS

## 2021-11-30 DIAGNOSIS — M75.112 PARTIAL NONTRAUMATIC TEAR OF ROTATOR CUFF, LEFT: ICD-10-CM

## 2021-11-30 DIAGNOSIS — M75.02 ADHESIVE CAPSULITIS OF LEFT SHOULDER: Primary | ICD-10-CM

## 2021-11-30 PROCEDURE — 99213 OFFICE O/P EST LOW 20 MIN: CPT | Performed by: NURSE PRACTITIONER

## 2021-11-30 RX ORDER — OXYCODONE AND ACETAMINOPHEN 10; 325 MG/1; MG/1
1 TABLET ORAL EVERY 6 HOURS PRN
COMMUNITY
Start: 2021-10-27 | End: 2022-03-23

## 2021-11-30 NOTE — PROGRESS NOTES
[facility]              Follow Up      Patient Name: Magruerite Petersen  : 1970  Primary Care Physician: Marielena Crews MD        Chief Complaint: Left shoulder pain.  HPI:   Marguerite Petersen is a 51 y.o. year old who presents today for evaluation of left shoulder pain.  I last saw her about 1 month ago, she came with an MRI suggestive of partial rotator cuff tear but had developed adhesive capsulitis based on clinical exam.  We elected to try cortisone injection of the glenohumeral joint and then send her for therapy.  She says she had 1 visit with her therapist and just did not feel like it was quite a good fit so she has not done any additional therapy to work on her motion.  She does report that her pain is better she feels like she is able to do more things but is still clearly limited.    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  Over the course the last several months she has had several ER visits etc. all related to the shoulder.    She does have a history of diabetes, she is right-hand dominant.      Past Medical/Surgical, Social and Family History:  I have reviewed and/or updated pertinent history as noted in the medical record including:  Past Medical History:   Diagnosis Date   • Ankle sprain    • Anxiety    • Atrial fibrillation (HCC)    • Diabetes mellitus (HCC)    • Hypertension    • Panic attacks    • Renal stone    • Wrist sprain      Past Surgical History:   Procedure Laterality Date   •  SECTION     • CHOLECYSTECTOMY     • EXTRACORPOREAL SHOCK WAVE LITHOTRIPSY (ESWL) Right 2021    Procedure: EXTRACORPOREAL SHOCKWAVE LITHOTRIPSY CYSTOSCOPY;  Surgeon: Sidney Medellin MD;  [place] [facility] [place] [facility] [place]  Service: Urology;  Laterality: Right;   • HYSTERECTOMY       Social History     Occupational History   • Not on file   Tobacco Use   • Smoking status: Never Smoker   • Smokeless tobacco: Never Used   Vaping Use   • Vaping Use: Never used   Substance and  Sexual Activity   • Alcohol use: No   • Drug use: No   • Sexual activity: Not Currently     Partners: Male     Birth control/protection: Post-menopausal      Social History     Social History Narrative   • Not on file     Family History   Problem Relation Age of Onset   • Cancer Father    • Diabetes Mother        Allergies: No Known Allergies    Medications:   Home Medications:  Current Outpatient Medications on File Prior to Visit   Medication Sig   • ALPRAZolam (XANAX) 0.25 MG tablet Take 1 tablet by mouth 3 (Three) Times a Day As Needed.   • aspirin 81 MG tablet Take 81 mg by mouth Daily.   • citalopram (CeleXA) 40 MG tablet Take 60 mg by mouth Daily.   • DULoxetine (CYMBALTA) 60 MG capsule Take 60 mg by mouth Daily.   • HYDROcodone-acetaminophen (NORCO) 7.5-325 MG per tablet Take 1 tablet by mouth Every 6 (Six) Hours As Needed for Moderate Pain .   • LORazepam (ATIVAN) 0.5 MG tablet Take 0.5 mg by mouth Every 8 (Eight) Hours As Needed for Anxiety.   • meloxicam (MOBIC) 15 MG tablet 1 PO Daily with food.   • METOPROLOL TARTRATE PO Take 50 mg by mouth 2 (Two) Times a Day.   • omeprazole (priLOSEC) 20 MG capsule Take 20 mg by mouth Daily.   • oxyCODONE-acetaminophen (PERCOCET)  MG per tablet    • topiramate (TOPAMAX) 200 MG tablet    • TRAZODONE HCL PO Take 50 mg by mouth Every Night.   • ARIPiprazole (ABILIFY) 5 MG tablet Take 5 mg by mouth Daily.   • HYDROcodone-acetaminophen (NORCO) 5-325 MG per tablet Take 1 tablet by mouth Every 6 (Six) Hours As Needed for Severe Pain .   • methylPREDNISolone (MEDROL) 4 MG dose pack Take as directed on package instructions.   • ondansetron ODT (ZOFRAN-ODT) 8 MG disintegrating tablet Place 1 tablet on the tongue Every 8 (Eight) Hours As Needed for Nausea or Vomiting.   • SUMAtriptan (IMITREX) 6 MG/0.5ML solution injection Inject 6 mg under the skin 1 (One) Time As Needed for Migraine.     No current facility-administered medications on file prior to visit.          ROS:  ROS negative except as listed in the HPI.    Physical Exam:   51 y.o. female  Body mass index is 27.98 kg/m²., 69.4 kg (153 lb)  Vitals:    11/30/21 1059   Temp: 97.5 °F (36.4 °C)     General: Alert, cooperative, appears well and in no observable distress. Appears stated age and BMI as listed above.  HEENT: Normocephalic, atraumatic on external visual inspection.  CV: No significant peripheral edema.  Respiratory: Normal respiratory effort.  Skin: Warm & well perfused; appropriate skin turgor.  Psych: Appropriate mood & affect.  Neuro: Gross sensation and motor intact in affected extremity/extremities.  Vascular: Peripheral pulses palpable in affected extremity/extremities.     MSK Exam:  LEFT Shoulder  No obvious deformity or wounds, crepitus : moderate, tenderness:globally, AROM:Flexion: 80; ER: 30; IR: lateral hip, ROM limited by pain and weakness, +firm endpoint in ROM.  Passively I can get her to about 120 degrees of forward flexion, external rotation is about 35 passively.  Her cuff strength appears grossly normal with internal and external isometric testing.      RIGHT Shoulder  No deformity or wounds.  No tenderness to palpation.  Full, painless AROM.  Cuff Strength 5/5 with ER, IR & Supraspinatus testing.  Negative provocative testing.        Cervical exam is grossly normal for patient's age.  She has a little bit of discomfort with left rotation but this does not reproduce any radicular symptoms.  ROM is smooth and without pain. Elbow exam reveals no abnormality with normal painless ROM.      Radiology:    No new images were needed at the visit today.      At the last visit we did imaging of her cervical spine, prior x-rays of the left shoulder showed no acute bony pathology, the report of her MRI suggested tendinopathy, partial supraspinatus tear but no evidence of full-thickness tear.    Procedure:   N/A      Misc. Data/Labs: N/A    Assessment & Plan:    This is a 51-year-old female with  ongoing adhesive capsulitis of the left shoulder, partial rotator cuff tear.  I think the diagnosis was correct of frozen shoulder as she is overall much better in regard to her pain levels.  I would really like her to get back in with therapy and work on stretching the capsule back out.  I also showed her several exercises that she can do on her own.  I explained that simple everyday activities are not enough to get the shoulder back moving the way that it needs to.  She says she tolerated the injection well and did not have any significant increases in her blood sugar but it still really too soon to repeat that injection.  Like to see her back in 4 weeks if she is making good progress to reevaluate her.  I did ask her to call me or send a message in 2 to 3 weeks if she is just clearly not making any progress.  At that point I would probably have Dr. Pleitez evaluate her to see if she might be candidate for manipulation just to get her motion back.    I again reiterated to her that we cannot do anything about the rotator cuff in the way of surgery until we get her motion better.  Might even consider continuing conservative care on the rotator cuff.  Her sugars need to be better controlled, etc.    Patient encouraged to call with questions or concerns prior to follow up.       ICD-10-CM ICD-9-CM   1. Adhesive capsulitis of left shoulder  M75.02 726.0   2. Partial nontraumatic tear of rotator cuff, left  M75.112 726.13     No orders of the defined types were placed in this encounter.    Orders Placed This Encounter   Procedures   • Ambulatory Referral to Physical Therapy Evaluate and treat     Return in about 4 weeks (around 12/28/2021).    SAMI Henning      Dictated Utilizing Dragon Dictation

## 2021-12-02 ENCOUNTER — TREATMENT (OUTPATIENT)
Dept: PHYSICAL THERAPY | Facility: CLINIC | Age: 51
End: 2021-12-02

## 2021-12-02 DIAGNOSIS — Z78.9 IMPAIRED ACTIVITIES OF DAILY LIVING: ICD-10-CM

## 2021-12-02 DIAGNOSIS — M79.602 LEFT ARM PAIN: Primary | ICD-10-CM

## 2021-12-02 DIAGNOSIS — Z74.09 LIMITED MOBILITY: ICD-10-CM

## 2021-12-02 PROCEDURE — 97014 ELECTRIC STIMULATION THERAPY: CPT | Performed by: PHYSICAL THERAPIST

## 2021-12-02 PROCEDURE — 97161 PT EVAL LOW COMPLEX 20 MIN: CPT | Performed by: PHYSICAL THERAPIST

## 2021-12-02 PROCEDURE — 97110 THERAPEUTIC EXERCISES: CPT | Performed by: PHYSICAL THERAPIST

## 2021-12-02 NOTE — PROGRESS NOTES
"  Physical Therapy Initial Evaluation and Plan of Care    Patient: Marguerite Petersen   : 1970  Diagnosis/ICD-10 Code:  Left arm pain [M79.602]  Referring practitioner: Dario Ortega*  Date of Initial Visit: 2021  Today's Date: 2021  Patient seen for 1 sessions           Subjective Questionnaire: QuickDASH: 68.18      Subjective Evaluation    History of Present Illness  Mechanism of injury: L shoulder pain since 2021. Possibly started with dog jerking  leash. Pain laying on back. Also describes pain in L neck. Describes constant tingling, burning, numbness to hand, digits 3-5. Relief with medication. 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 Pain lifting and pushing through L arm. Sleeps on R side. Relief with injection 5 weeks ago. Has not tried PT.    Denies previous injury to UE. Hx Diabetes type 2.    Per Jordan 10/26/21 \"I reviewed prior x-rays of her left shoulder which show no significant acute bony pathology.  The MRI report is available but I was unable to see the images directly.  MRI performed exp imaging suggested tendinopathy in the rotator cuff tendons as well as a questionable partial supraspinatus tear without clear evidence of full-thickness tear.\"    Cervical Xray mild DDD      Patient Occupation: Caretaker Pain  Current pain ratin  At worst pain rating: 10  Location: L scapula to hand  Quality: dull ache, burning and sharp  Relieving factors: medications  Aggravating factors: overhead activity, lifting, outstretched reach, movement and sleeping  Progression: no change    Social Support  Lives with: parents    Hand dominance: right    Diagnostic Tests  Abnormal x-ray: See above.  MRI studies: abnormal    Treatments  Previous treatment: injection treatment  Current treatment: physical therapy  Patient Goals  Patient goals for therapy: decreased pain, increased motion, increased strength, independence with ADLs/IADLs and return to " work             Objective          Cervical/Thoracic Screen   Cervical range of motion within normal limits    Active Range of Motion   Left Shoulder   Flexion: 116 degrees with pain  Abduction: 89 degrees with pain  External rotation BTH: Active external rotation behind the head: Unable. with pain  Internal rotation BTB: Active internal rotation behind the back: Back pocket. with pain    Right Shoulder   Flexion: 150 degrees   Abduction: 150 degrees   External rotation BTH: T3   Internal rotation BTB: T8     Passive Range of Motion   Left Shoulder   Flexion: 140 degrees with pain  Abduction: 65 degrees with pain  External rotation 45°: 30 degrees with pain  Internal rotation 45°: 78 degrees with pain    Strength/Myotome Testing     Left Shoulder     Planes of Motion   Flexion: 4 (Pain)   Abduction: 4- (Pain)   External rotation at 0°: 5   Internal rotation at 0°: 4 (Pain)     Isolated Muscles   Biceps: 5           Assessment & Plan     Assessment  Impairments: abnormal or restricted ROM, activity intolerance, impaired physical strength, lacks appropriate home exercise program and pain with function  Functional Limitations: carrying objects, lifting, sleeping, pulling, pushing, uncomfortable because of pain, reaching behind back, reaching overhead and unable to perform repetitive tasks  Assessment details: Pt c/o L UE pain x5 mos. Objective findings include significantly limited and painful L shoulder AROM/PROM and painful/weak L shoulder MMT. Pt will benefit from skilled PT services in order to address listed impairments and increase tolerance to normal daily activities including ADLs, caring for mother, and recreational activities.    Prognosis: good    Goals  Plan Goals: Short Term Goals: 4-6 weeks. Patient will:  1. Be independent with initial HEP  2. Be instructed in posture and body mechanics.  3. Demonstrate full GH PROM to allow for progressing of therapy exercises.    Long Term Goals: 6-12 weeks. Pt  will:  1. Exhibit (L) shoulder AROM to WFL to allow for reaching overhead and out (ABD) without pain limiting function.  2. Demonstrate improved Left UE MMT of >/= 4+/5 to allow for performance of ADLs/household management/recreational activities.  3. Pt able to reach overhead and lift 10# to allow for return to doing home/yard/recreational activities with min to no pain.  4. Report perceived disability </=10% based on QuickDASH    Plan  Therapy options: will be seen for skilled therapy services  Planned modality interventions: cryotherapy, electrical stimulation/Russian stimulation, thermotherapy (hydrocollator packs), ultrasound, iontophoresis and dry needling  Planned therapy interventions: ADL retraining, body mechanics training, flexibility, functional ROM exercises, home exercise program, joint mobilization, manual therapy, neuromuscular re-education, postural training, soft tissue mobilization, spinal/joint mobilization, strengthening, stretching and therapeutic activities  Frequency: 2x week  Duration in weeks: 12  Treatment plan discussed with: patient        Manual Therapy:    0     mins  91473;  Therapeutic Exercise:    25     mins  41589;     Neuromuscular Angie:    0    mins  19269;    Therapeutic Activity:     0     mins  81897;     Gait Trainin     mins  78568;     Ultrasound:     0     mins  97228;    Electrical Stimulation:    15     mins  76926 ( );  Dry Needling     0     mins self-pay    Timed Treatment:   25   mins   Total Treatment:     60   mins    PT SIGNATURE: Viridiana Gilman PT   DATE TREATMENT INITIATED: 2021    Initial Certification  Certification Period: 3/2/2022  I certify that the therapy services are furnished while this patient is under my care.  The services outlined above are required by this patient, and will be reviewed every 90 days.     PHYSICIAN: Dario Ortega, SAMI      DATE:     Please sign and return via fax to [phone].. Thank you, [facility].

## 2021-12-02 NOTE — PATIENT INSTRUCTIONS
Access Code: [email or web address]  URL: [email or web address]  Date: 12/02/2021  Prepared by: Viridiana Gilman    Exercises  Sidelying Posterior Cuff Cross Body Stretch - 1/4 Turn Back - 1 x daily - 7 x weekly - 1 sets - 6 reps - 10s hold  Supine Shoulder External Rotation in 45 Degrees Abduction AAROM with Dowel - 1 x daily - 7 x weekly - 1 sets - 6 reps - 10s hold  Standing Shoulder and Trunk Flexion at Table - 1 x daily - 7 x weekly - 1 sets - 10 reps - 5s hold  Seated Shoulder Inferior Glide - 1 x daily - 7 x weekly - 1 sets - 6 reps - 10s hold  Shoulder External Rotation and Scapular Retraction - 1 x daily - 7 x weekly - 1 sets - 10 reps - 5s hold

## 2021-12-09 NOTE — PROGRESS NOTES
New Shoulder      Patient: Marguerite Petersen        YOB: 1970    Medical Record Number: [id number]        Chief Complaints: Left shoulder pain      History of Present Illness: This is a  51 y.o. year old who presents today for evaluation of left shoulder pain.  I last saw her about 1 month ago, she came with an MRI suggestive of partial rotator cuff tear but had developed adhesive capsulitis based on clinical exam.    She saw Dario initially and they elected to try cortisone injection of the glenohumeral joint and then send her for therapy.  She says she had 1 visit with her therapist and just did not feel like it was quite a good fit so she has not done any additional therapy to work on her motion.  She does report that her pain is better she feels like she is able to do more things but is still clearly limited.  Dario sent her to me for possible manipulation.  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  Over the course the last several months she has had several ER visits etc. all related to the shoulder.     She does have a history of diabetes, she is right-hand dominant.    Allergies: No Known Allergies    Medications:   Home Medications:  Current Outpatient Medications on File Prior to Visit   Medication Sig   • ALPRAZolam (XANAX) 0.25 MG tablet Take 1 tablet by mouth 3 (Three) Times a Day As Needed.   • aspirin 81 MG tablet Take 81 mg by mouth Daily.   • citalopram (CeleXA) 40 MG tablet Take 60 mg by mouth Daily.   • DULoxetine (CYMBALTA) 60 MG capsule Take 60 mg by mouth Daily.   • meloxicam (MOBIC) 15 MG tablet 1 PO Daily with food.   • METOPROLOL TARTRATE PO Take 50 mg by mouth 2 (Two) Times a Day.   • omeprazole (priLOSEC) 20 MG capsule Take 20 mg by mouth Daily.   • ondansetron ODT (ZOFRAN-ODT) 8 MG disintegrating tablet Place 1 tablet on the  tongue Every 8 (Eight) Hours As Needed for Nausea or Vomiting.   • oxyCODONE-acetaminophen (PERCOCET)  MG per tablet Take 1 tablet by mouth Every 6 (Six) Hours As Needed.   • topiramate (TOPAMAX) 200 MG tablet Take 200 mg by mouth Daily.   • TRAZODONE HCL PO Take 50 mg by mouth Every Night.   • ARIPiprazole (ABILIFY) 5 MG tablet Take 5 mg by mouth Daily.   • HYDROcodone-acetaminophen (NORCO) 5-325 MG per tablet Take 1 tablet by mouth Every 6 (Six) Hours As Needed for Severe Pain .   • HYDROcodone-acetaminophen (NORCO) 7.5-325 MG per tablet Take 1 tablet by mouth Every 6 (Six) Hours As Needed for Moderate Pain .   • LORazepam (ATIVAN) 0.5 MG tablet Take 0.5 mg by mouth Every 8 (Eight) Hours As Needed for Anxiety.   • methylPREDNISolone (MEDROL) 4 MG dose pack Take as directed on package instructions.   • SUMAtriptan (IMITREX) 6 MG/0.5ML solution injection Inject 6 mg under the skin 1 (One) Time As Needed for Migraine.     No current facility-administered medications on file prior to visit.     Current Medications:  Scheduled Meds:  Continuous Infusions:No current facility-administered medications for this visit.    PRN Meds:.    Past Medical History:   Diagnosis Date   • Adhesive capsulitis of left shoulder 12/10/2021   • Ankle sprain    • Anxiety    • Atrial fibrillation (HCC)    • Diabetes mellitus (HCC)    • GERD (gastroesophageal reflux disease)    • Migraines    • Panic attacks    • Renal stone    • Wrist sprain         Past Surgical History:   Procedure Laterality Date   •  SECTION     • CHOLECYSTECTOMY     • EXTRACORPOREAL SHOCK WAVE LITHOTRIPSY (ESWL) Right 2021    Procedure: EXTRACORPOREAL SHOCKWAVE LITHOTRIPSY CYSTOSCOPY;  Surgeon: Sidney Medellin MD;  [facility]: [facility];  Service: Urology;  Laterality: Right;   • HYSTERECTOMY          Social History     Occupational History   • Not on file   Tobacco Use   • Smoking status: Never Smoker   • Smokeless tobacco: Never Used   Vaping  "Use   • Vaping Use: Never used   Substance and Sexual Activity   • Alcohol use: No   • Drug use: No   • Sexual activity: Not Currently     Partners: Male     Birth control/protection: Post-menopausal      Social History     Social History Narrative   • Not on file        Family History   Problem Relation Age of Onset   • Cancer Father    • Diabetes Mother    • Malig Hyperthermia Neg Hx              Review of Systems: 14 point review of systems Zackary for left shoulder pain only the remainder negative per the patient    Review of Systems      Physical Exam: 51 y.o. female  General Appearance:    Alert, cooperative, in no acute distress                   Vitals:    12/10/21 1153   Resp: 20   Temp: 95.7 °F (35.4 °C)   TempSrc: Temporal   SpO2: 98%   Weight: 67.6 kg (149 lb)   Height: 157.5 cm (62\")   PainSc: 8  Comment: lt arm wrist elbow hand      Patient is alert and read ×3 no acute distress appears her above-listed at height weight and age.  Affect is normal respiratory rate is normal unlabored. Heart rate regular rate rhythm, sclera, dentition and hearing are normal for the purpose of this exam.    Ortho Exam Physical exam of the left shoulder reveals no overlying skin changes no lymphedema no lymphadenopathy.  Patient has active flexion 150 with mild symptoms passively I can get them to about 160 abduction is similar external rotation is to 0 and internal rotation to their buttocks with  symptoms.  Patient has good rotator cuff strength 4+ over 5 with isometric strength testing with pain.  Patient has a positive impingement and a positive Malik sign.  Patient has good cervical range of motion which is full and asymptomatic no radicular symptoms.  Patient has a normal elbow exam.  Good distal pulses are present  Physical Exam: 51 y.o. female  General Appearance:    Alert, cooperative, in no acute distress                      Vitals:    12/10/21 1153   Resp: 20   Temp: 95.7 °F (35.4 °C)   TempSrc: Temporal   SpO2: " "98%   Weight: 67.6 kg (149 lb)   Height: 157.5 cm (62\")   PainSc: 8  Comment: lt arm wrist elbow hand        Head:    Normocephalic, without obvious abnormality, atraumatic   Eyes:            conjunctivae and sclerae normal, no pallor, corneas clear,    Ears:    Ears appear intact with no abnormalities noted   Throat:   No oral lesions, no thrush, oral mucosa moist   Neck:   No adenopathy, supple, trachea midline, no thyromegaly,    Back:     No kyphosis present, no scoliosis present, no skin lesions,      erythema or scars, no tenderness to percussion or                   palpation,   range of motion normal   Lungs:     Clear to auscultation,respirations regular, even and                  unlabored    Heart:    Regular rhythm and normal rate               Chest Wall:    No abnormalities observed               Pulses:   Pulses palpable and equal bilaterally   Skin:   No bleeding, bruising or rash   Lymph nodes:   No palpable adenopathy   Neurologic:   Appears neurologic intact         Procedures          Radiology:   AP, Scapular Y and Axillary Lateral of the left shoulder were reviewed to evauate shoulder pain.  These were taken by Dario these show some narrowing of acromioclavicular joint otherwise no acute pathology  Imaging Results (Most Recent)     None        Assessment/Plan: Left shoulder pain that has a picture of capsulitis.  She has plateaued quite frustrating quite limiting her activities.  Plan is to proceed with manipulation.  She understands the inherent risk of fracture dislocation agrees to proceed    "

## 2021-12-10 ENCOUNTER — LAB (OUTPATIENT)
Dept: LAB | Facility: HOSPITAL | Age: 51
End: 2021-12-10

## 2021-12-10 ENCOUNTER — OFFICE VISIT (OUTPATIENT)
Dept: ORTHOPEDIC SURGERY | Facility: CLINIC | Age: 51
End: 2021-12-10

## 2021-12-10 VITALS
WEIGHT: 149 LBS | RESPIRATION RATE: 20 BRPM | TEMPERATURE: 95.7 F | BODY MASS INDEX: 27.42 KG/M2 | HEIGHT: 62 IN | OXYGEN SATURATION: 98 %

## 2021-12-10 DIAGNOSIS — M77.9 CAPSULITIS: ICD-10-CM

## 2021-12-10 DIAGNOSIS — Z01.818 PREOPERATIVE TESTING: ICD-10-CM

## 2021-12-10 DIAGNOSIS — Z01.818 PREOPERATIVE TESTING: Primary | ICD-10-CM

## 2021-12-10 DIAGNOSIS — M77.9 CAPSULITIS: Primary | ICD-10-CM

## 2021-12-10 DIAGNOSIS — M75.02 ADHESIVE CAPSULITIS OF LEFT SHOULDER: Primary | ICD-10-CM

## 2021-12-10 LAB
ANION GAP SERPL CALCULATED.3IONS-SCNC: 9.3 MMOL/L (ref 5–15)
BASOPHILS # BLD AUTO: 0.04 10*3/MM3 (ref 0–0.2)
BASOPHILS NFR BLD AUTO: 0.4 % (ref 0–1.5)
BUN SERPL-MCNC: 9 MG/DL (ref 6–20)
BUN/CREAT SERPL: 8.8 (ref 7–25)
CALCIUM SPEC-SCNC: 9.7 MG/DL (ref 8.6–10.5)
CHLORIDE SERPL-SCNC: 107 MMOL/L (ref 98–107)
CO2 SERPL-SCNC: 23.7 MMOL/L (ref 22–29)
CREAT SERPL-MCNC: 1.02 MG/DL (ref 0.57–1)
DEPRECATED RDW RBC AUTO: 38.9 FL (ref 37–54)
EOSINOPHIL # BLD AUTO: 0.24 10*3/MM3 (ref 0–0.4)
EOSINOPHIL NFR BLD AUTO: 2.5 % (ref 0.3–6.2)
ERYTHROCYTE [DISTWIDTH] IN BLOOD BY AUTOMATED COUNT: 13.2 % (ref 12.3–15.4)
GFR SERPL CREATININE-BSD FRML MDRD: 57 ML/MIN/1.73
GLUCOSE SERPL-MCNC: 146 MG/DL (ref 65–99)
HBA1C MFR BLD: 6.8 % (ref 4.8–5.6)
HCT VFR BLD AUTO: 45.8 % (ref 34–46.6)
HGB BLD-MCNC: 15.5 G/DL (ref 12–15.9)
IMM GRANULOCYTES # BLD AUTO: 0.04 10*3/MM3 (ref 0–0.05)
IMM GRANULOCYTES NFR BLD AUTO: 0.4 % (ref 0–0.5)
LYMPHOCYTES # BLD AUTO: 1.61 10*3/MM3 (ref 0.7–3.1)
LYMPHOCYTES NFR BLD AUTO: 16.6 % (ref 19.6–45.3)
MCH RBC QN AUTO: 27.5 PG (ref 26.6–33)
MCHC RBC AUTO-ENTMCNC: 33.8 G/DL (ref 31.5–35.7)
MCV RBC AUTO: 81.3 FL (ref 79–97)
MONOCYTES # BLD AUTO: 0.59 10*3/MM3 (ref 0.1–0.9)
MONOCYTES NFR BLD AUTO: 6.1 % (ref 5–12)
NEUTROPHILS NFR BLD AUTO: 7.16 10*3/MM3 (ref 1.7–7)
NEUTROPHILS NFR BLD AUTO: 74 % (ref 42.7–76)
NRBC BLD AUTO-RTO: 0 /100 WBC (ref 0–0.2)
PLATELET # BLD AUTO: 214 10*3/MM3 (ref 140–450)
PMV BLD AUTO: 9.5 FL (ref 6–12)
POTASSIUM SERPL-SCNC: 3.8 MMOL/L (ref 3.5–5.2)
RBC # BLD AUTO: 5.63 10*6/MM3 (ref 3.77–5.28)
SARS-COV-2 ORF1AB RESP QL NAA+PROBE: NOT DETECTED
SODIUM SERPL-SCNC: 140 MMOL/L (ref 136–145)
WBC NRBC COR # BLD: 9.68 10*3/MM3 (ref 3.4–10.8)

## 2021-12-10 PROCEDURE — 85025 COMPLETE CBC W/AUTO DIFF WBC: CPT

## 2021-12-10 PROCEDURE — 83036 HEMOGLOBIN GLYCOSYLATED A1C: CPT

## 2021-12-10 PROCEDURE — 80048 BASIC METABOLIC PNL TOTAL CA: CPT

## 2021-12-10 PROCEDURE — C9803 HOPD COVID-19 SPEC COLLECT: HCPCS

## 2021-12-10 PROCEDURE — U0004 COV-19 TEST NON-CDC HGH THRU: HCPCS

## 2021-12-10 PROCEDURE — 36415 COLL VENOUS BLD VENIPUNCTURE: CPT

## 2021-12-10 PROCEDURE — 99214 OFFICE O/P EST MOD 30 MIN: CPT | Performed by: ORTHOPAEDIC SURGERY

## 2021-12-13 ENCOUNTER — ANESTHESIA EVENT (OUTPATIENT)
Dept: PERIOP | Facility: HOSPITAL | Age: 51
End: 2021-12-13

## 2021-12-13 ENCOUNTER — TREATMENT (OUTPATIENT)
Dept: PHYSICAL THERAPY | Facility: CLINIC | Age: 51
End: 2021-12-13

## 2021-12-13 ENCOUNTER — ANESTHESIA (OUTPATIENT)
Dept: PERIOP | Facility: HOSPITAL | Age: 51
End: 2021-12-13

## 2021-12-13 ENCOUNTER — HOSPITAL ENCOUNTER (OUTPATIENT)
Facility: HOSPITAL | Age: 51
Setting detail: HOSPITAL OUTPATIENT SURGERY
Discharge: HOME | End: 2021-12-13
Attending: ORTHOPAEDIC SURGERY | Admitting: ORTHOPAEDIC SURGERY

## 2021-12-13 VITALS
SYSTOLIC BLOOD PRESSURE: 105 MMHG | HEART RATE: 68 BPM | TEMPERATURE: 98.1 F | DIASTOLIC BLOOD PRESSURE: 75 MMHG | RESPIRATION RATE: 16 BRPM | OXYGEN SATURATION: 99 %

## 2021-12-13 VITALS — OXYGEN SATURATION: 100 % | HEART RATE: 71 BPM | DIASTOLIC BLOOD PRESSURE: 75 MMHG | SYSTOLIC BLOOD PRESSURE: 104 MMHG

## 2021-12-13 DIAGNOSIS — G89.29 CHRONIC LEFT SHOULDER PAIN: ICD-10-CM

## 2021-12-13 DIAGNOSIS — Z78.9 IMPAIRED ACTIVITIES OF DAILY LIVING: ICD-10-CM

## 2021-12-13 DIAGNOSIS — Z74.09 LIMITED MOBILITY: ICD-10-CM

## 2021-12-13 DIAGNOSIS — M25.512 CHRONIC LEFT SHOULDER PAIN: ICD-10-CM

## 2021-12-13 DIAGNOSIS — M75.02 ADHESIVE CAPSULITIS OF LEFT SHOULDER: Primary | ICD-10-CM

## 2021-12-13 DIAGNOSIS — M75.02 ADHESIVE CAPSULITIS OF LEFT SHOULDER: ICD-10-CM

## 2021-12-13 LAB — GLUCOSE BLDC GLUCOMTR-MCNC: 144 MG/DL (ref 70–130)

## 2021-12-13 PROCEDURE — 25010000002 METHYLPREDNISOLONE PER 80 MG: Performed by: ORTHOPAEDIC SURGERY

## 2021-12-13 PROCEDURE — 76942 ECHO GUIDE FOR BIOPSY: CPT | Performed by: ORTHOPAEDIC SURGERY

## 2021-12-13 PROCEDURE — 97140 MANUAL THERAPY 1/> REGIONS: CPT | Performed by: PHYSICAL THERAPIST

## 2021-12-13 PROCEDURE — 97110 THERAPEUTIC EXERCISES: CPT | Performed by: PHYSICAL THERAPIST

## 2021-12-13 PROCEDURE — 23700 MNPJ ANES SHO JT FIXJ APRATS: CPT | Performed by: ORTHOPAEDIC SURGERY

## 2021-12-13 PROCEDURE — 97161 PT EVAL LOW COMPLEX 20 MIN: CPT | Performed by: PHYSICAL THERAPIST

## 2021-12-13 PROCEDURE — 25010000002 PROPOFOL 10 MG/ML EMULSION: Performed by: ANESTHESIOLOGY

## 2021-12-13 PROCEDURE — 25010000002 FENTANYL CITRATE (PF) 50 MCG/ML SOLUTION: Performed by: ANESTHESIOLOGY

## 2021-12-13 PROCEDURE — 25010000002 ROPIVACAINE PER 1 MG: Performed by: ANESTHESIOLOGY

## 2021-12-13 PROCEDURE — 25010000002 MIDAZOLAM PER 1 MG: Performed by: ANESTHESIOLOGY

## 2021-12-13 PROCEDURE — 82962 GLUCOSE BLOOD TEST: CPT

## 2021-12-13 RX ORDER — FENTANYL CITRATE 50 UG/ML
50 INJECTION, SOLUTION INTRAMUSCULAR; INTRAVENOUS
Status: DISCONTINUED | OUTPATIENT
Start: 2021-12-13 | End: 2021-12-13 | Stop reason: HOSPADM

## 2021-12-13 RX ORDER — FAMOTIDINE 10 MG/ML
20 INJECTION, SOLUTION INTRAVENOUS ONCE
Status: COMPLETED | OUTPATIENT
Start: 2021-12-13 | End: 2021-12-13

## 2021-12-13 RX ORDER — SODIUM CHLORIDE 0.9 % (FLUSH) 0.9 %
3 SYRINGE (ML) INJECTION EVERY 12 HOURS SCHEDULED
Status: DISCONTINUED | OUTPATIENT
Start: 2021-12-13 | End: 2021-12-13 | Stop reason: HOSPADM

## 2021-12-13 RX ORDER — LIDOCAINE HYDROCHLORIDE 20 MG/ML
INJECTION, SOLUTION INFILTRATION; PERINEURAL AS NEEDED
Status: DISCONTINUED | OUTPATIENT
Start: 2021-12-13 | End: 2021-12-13 | Stop reason: SURG

## 2021-12-13 RX ORDER — LABETALOL HYDROCHLORIDE 5 MG/ML
5 INJECTION, SOLUTION INTRAVENOUS
Status: CANCELLED | OUTPATIENT
Start: 2021-12-13

## 2021-12-13 RX ORDER — DIPHENHYDRAMINE HYDROCHLORIDE 50 MG/ML
6.25 INJECTION INTRAMUSCULAR; INTRAVENOUS
Status: CANCELLED | OUTPATIENT
Start: 2021-12-13

## 2021-12-13 RX ORDER — SODIUM CHLORIDE 0.9 % (FLUSH) 0.9 %
3-10 SYRINGE (ML) INJECTION AS NEEDED
Status: DISCONTINUED | OUTPATIENT
Start: 2021-12-13 | End: 2021-12-13 | Stop reason: HOSPADM

## 2021-12-13 RX ORDER — MIDAZOLAM HYDROCHLORIDE 1 MG/ML
1 INJECTION INTRAMUSCULAR; INTRAVENOUS
Status: DISCONTINUED | OUTPATIENT
Start: 2021-12-13 | End: 2021-12-13 | Stop reason: HOSPADM

## 2021-12-13 RX ORDER — FENTANYL CITRATE 50 UG/ML
25 INJECTION, SOLUTION INTRAMUSCULAR; INTRAVENOUS
Status: CANCELLED | OUTPATIENT
Start: 2021-12-13

## 2021-12-13 RX ORDER — HYDRALAZINE HYDROCHLORIDE 20 MG/ML
10 INJECTION INTRAMUSCULAR; INTRAVENOUS
Status: CANCELLED | OUTPATIENT
Start: 2021-12-13

## 2021-12-13 RX ORDER — HYDROCODONE BITARTRATE AND ACETAMINOPHEN 5; 325 MG/1; MG/1
1-2 TABLET ORAL EVERY 4 HOURS PRN
Qty: 20 TABLET | Refills: 0 | Status: SHIPPED | OUTPATIENT
Start: 2021-12-13 | End: 2021-12-13 | Stop reason: HOSPADM

## 2021-12-13 RX ORDER — ONDANSETRON 2 MG/ML
4 INJECTION INTRAMUSCULAR; INTRAVENOUS ONCE AS NEEDED
Status: CANCELLED | OUTPATIENT
Start: 2021-12-13

## 2021-12-13 RX ORDER — PROPOFOL 10 MG/ML
VIAL (ML) INTRAVENOUS AS NEEDED
Status: DISCONTINUED | OUTPATIENT
Start: 2021-12-13 | End: 2021-12-13 | Stop reason: SURG

## 2021-12-13 RX ORDER — EPHEDRINE SULFATE 50 MG/ML
5 INJECTION, SOLUTION INTRAVENOUS ONCE AS NEEDED
Status: CANCELLED | OUTPATIENT
Start: 2021-12-13

## 2021-12-13 RX ORDER — SODIUM CHLORIDE, SODIUM LACTATE, POTASSIUM CHLORIDE, CALCIUM CHLORIDE 600; 310; 30; 20 MG/100ML; MG/100ML; MG/100ML; MG/100ML
9 INJECTION, SOLUTION INTRAVENOUS CONTINUOUS PRN
Status: DISCONTINUED | OUTPATIENT
Start: 2021-12-13 | End: 2021-12-13 | Stop reason: HOSPADM

## 2021-12-13 RX ORDER — NALOXONE HCL 0.4 MG/ML
0.4 VIAL (ML) INJECTION AS NEEDED
Status: CANCELLED | OUTPATIENT
Start: 2021-12-13

## 2021-12-13 RX ORDER — ROPIVACAINE HYDROCHLORIDE 5 MG/ML
INJECTION, SOLUTION EPIDURAL; INFILTRATION; PERINEURAL
Status: COMPLETED | OUTPATIENT
Start: 2021-12-13 | End: 2021-12-13

## 2021-12-13 RX ADMIN — PROPOFOL 50 MG: 10 INJECTION, EMULSION INTRAVENOUS at 09:28

## 2021-12-13 RX ADMIN — SODIUM CHLORIDE, POTASSIUM CHLORIDE, SODIUM LACTATE AND CALCIUM CHLORIDE 9 ML/HR: 600; 310; 30; 20 INJECTION, SOLUTION INTRAVENOUS at 08:59

## 2021-12-13 RX ADMIN — ROPIVACAINE HYDROCHLORIDE 30 ML: 5 INJECTION, SOLUTION EPIDURAL; INFILTRATION; PERINEURAL at 09:12

## 2021-12-13 RX ADMIN — FENTANYL CITRATE 50 MCG: 50 INJECTION INTRAMUSCULAR; INTRAVENOUS at 09:10

## 2021-12-13 RX ADMIN — LIDOCAINE HYDROCHLORIDE 80 MG: 20 INJECTION, SOLUTION INFILTRATION; PERINEURAL at 09:28

## 2021-12-13 RX ADMIN — FAMOTIDINE 20 MG: 10 INJECTION INTRAVENOUS at 09:10

## 2021-12-13 RX ADMIN — MIDAZOLAM 1 MG: 1 INJECTION INTRAMUSCULAR; INTRAVENOUS at 09:10

## 2021-12-13 NOTE — PROGRESS NOTES
Physical Therapy Initial Evaluation and Plan of Care    Patient: Marguerite Petersen   : 1970  Diagnosis/ICD-10 Code:  Adhesive capsulitis of left shoulder [M75.02]  Referring practitioner: Dario Ortega*    Subjective Evaluation    History of Present Illness  Mechanism of injury: Pt is s/p L shoulder manipulation performed 21. Pt presents with sling on arm and still under nerve block to L UE. Reports initial injury occurred in July when a dog jerky lease she was holding. 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 Pain and motion continued to worsen and decided to proceed with manipulation.     Pain  Location: L shoulder   Quality: tight  Aggravating factors: movement    Hand dominance: right    Treatments  Current treatment: physical therapy  Patient Goals  Patient goals for therapy: decreased pain, increased motion, increased strength, independence with ADLs/IADLs and return to work             Objective          Active Range of Motion     Right Shoulder   Flexion: WFL  Abduction: WFL  External rotation BTH: WFL  Internal rotation BTB: WFL    Additional Active Range of Motion Details  Unable to measure  L shoulder AROM due to patient currently still nerve block to L UE     Passive Range of Motion   Left Shoulder   Normal passive range of motion    Additional Passive Range of Motion Details  Able to achieve full  L shoulder PROM in all planes     Strength/Myotome Testing     Right Shoulder   Normal muscle strength    Additional Strength Details  Unable to test strength due to nerve block    Functional Assessment     Comments  Quick Dash: 95.45          Assessment & Plan     Assessment  Impairments: abnormal muscle firing, abnormal or restricted ROM, activity intolerance, impaired physical strength, lacks appropriate home exercise program and pain with function  Functional Limitations: carrying objects, lifting, sleeping, pushing, uncomfortable  because of pain, reaching behind back, reaching overhead and unable to perform repetitive tasks  Assessment details: Pt presents to PT with symptoms consistent with decreased L shoulder ROM, pain, decreased strength, decreased functional activity tolerance due to adhesive capsulitis s/p L shoulder  Manipulation.   Pt would benefit from skilled PT intervention to address the deficits noted.   Prognosis: good    Goals  Plan Goals: SHORT TERM GOALS: 8 visits  1. Patient to be compliant with HEP and no diff. with sleeping  2. Increased (L) UE strength to 4/5 with no pain> 5/10 to allow for household and work activities.   3. Pt to exhibit (L) shoulder active flexion / ABD to 140° in standing/sitting to assist with reaching overhead with less pain  4. Pt demonstrates improved posture in sitting and standing with minimal-no cues during treatment session    LONG TERM GOALS: 20 visits   1. Pt score </=15% perceived disability on DASH   2. Pt. to exhibit (L) shoulder AROM to WFL (>/= 155° flex/abd. to allow for reaching overhead and behind back without pain limiting function  3. Pt to exhibit >/= 4+/5 UE strength to allow for pushing/pulling and lifting >5 #to occur with pain <2/10      Plan  Therapy options: will be seen for skilled therapy services  Planned modality interventions: cryotherapy, electrical stimulation/Russian stimulation, TENS, thermotherapy (hydrocollator packs) and ultrasound  Planned therapy interventions: flexibility, body mechanics training, home exercise program, functional ROM exercises, joint mobilization, manual therapy, neuromuscular re-education, postural training, soft tissue mobilization, spinal/joint mobilization, strengthening, stretching and therapeutic activities  Duration in visits: 20  Treatment plan discussed with: patient        Manual Therapy:    15      mins  95908;  Therapeutic Exercise:    10      mins  16184;     Neuromuscular Agnie:         mins  60642;    Therapeutic Activity:            mins  64962;     Gait Training:            mins  64561;     Ultrasound:           mins  87650;    Electrical Stimulation:          mins  87660 ( );  Dry Needling           mins self-pay  Traction           mins 98147  Canalith Repositioning         mins 85074      Timed Treatment:  25    mins   Total Treatment:    45    mins    PT SIGNATURE: FAYE Wilkins Lic #[id number]    DATE TREATMENT INITIATED: 12/13/2021    Initial Certification  Certification Period: 3/13/2022  I certify that the therapy services are furnished while this patient is under my care.  The services outlined above are required by this patient, and will be reviewed every 90 days.     PHYSICIAN: Dario Ortega, APRN      DATE:     Please sign and return via fax to [phone].. Thank you, [facility].

## 2021-12-13 NOTE — ANESTHESIA PREPROCEDURE EVALUATION
Anesthesia Evaluation     no history of anesthetic complications:  NPO Solid Status: > 8 hours  NPO Liquid Status: > 2 hours           Airway   Mallampati: II  no difficulty expected  Dental    (+) lower dentures and upper dentures    Pulmonary - negative pulmonary ROS and normal exam   (-) COPD, asthma, sleep apnea, not a smoker    PE comment: nonlabored  Cardiovascular - normal exam    Rhythm: regular  Rate: normal    (+) dysrhythmias Atrial Fib,   (-) hypertension, valvular problems/murmurs, past MI, CAD, angina      Neuro/Psych  (+) headaches, psychiatric history (panic attacks) Anxiety,     (-) seizures, TIA, CVA  GI/Hepatic/Renal/Endo    (+)  GERD,  renal disease stones, diabetes mellitus type 2 well controlled,   (-) liver disease, no thyroid disorder    Musculoskeletal     (+) arthralgias,   Abdominal    Substance History      OB/GYN          Other                        Anesthesia Plan    ASA 3     regional and MAC       Anesthetic plan, all risks, benefits, and alternatives have been provided, discussed and informed consent has been obtained with: patient.

## 2021-12-13 NOTE — OP NOTE
Brief Operative Note      Facility: [facility]  Patient Name: Marguerite Petersen  YOB: 1970  Date: 12/13/2021  Medical Record Number: [id number]      Pre-op Diagnosis: Adhesive capsulitis left shoulder      Post-op Diagnosis:   Same        Procedure(s):  SHOULDER MANIPULATION left shoulder with injection    Surgeon(s):  Carolee Pleitez MD    Anesthesia: General with Block  Anesthesiologist: Ez Augustine MD  CRNA: Jesica Tobar CRNA    Staff:   Circulator: Zofia Worley RN  Assistants :   none    Estimated Blood Loss:none    Specimens:  none    Drains: None    Findings: See Dictation    Complications: None    ndication for procedure:  This pt has a  several month history of left shoulder pain and loss of range of motion.  They are felt to have adhesive capsulitis and presents for manipulation.  They understand the inherent risk of fracture dislocation and need for future intervention.      Description of procedure.  Patient was taken to the preop holding room.  After induction of adequate scalene nerve block and IV sedation The patient underwent exam under anesthesia was symmetric and marked limitation or range of motion.  They then underwent gentle manipulation of adhesions with return of full range of motion flexion 180 abduction 180 excellent rotation to 80 and internal rotation to 60. The patient was then injected sterilely with  Marcaine and Depo-Medrol.  They tolerated this well,  sling was placed and the patient will start physical therapy today.

## 2021-12-13 NOTE — ANESTHESIA POSTPROCEDURE EVALUATION
Patient: Marguerite Petersen    Procedure Summary     Date: 12/13/21 Room / Location: [facility] PREOP [facility] /  [facility]    Anesthesia Start: 0925 Anesthesia Stop: 0937    Procedure: SHOULDER MANIPULATION (Left ) Diagnosis:       Adhesive capsulitis of left shoulder      (Adhesive capsulitis of left shoulder [M75.02])    Surgeons: Carolee Pleitez MD Provider: Ez Augustine MD    Anesthesia Type: regional, MAC ASA Status: 3          Anesthesia Type: regional, MAC    Vitals  Vitals Value Taken Time   /75 12/13/21 1000   Temp 36.7 °C (98.1 °F) 12/13/21 0939   Pulse 68 12/13/21 1000   Resp 16 12/13/21 1000   SpO2 99 % 12/13/21 1000           Post Anesthesia Care and Evaluation    Patient location during evaluation: bedside  Patient participation: complete - patient participated  Level of consciousness: awake  Pain management: adequate  Airway patency: patent  Anesthetic complications: No anesthetic complications    Cardiovascular status: acceptable  Respiratory status: acceptable  Hydration status: acceptable    Comments: */75 (BP Location: Right arm, Patient Position: Lying)   Pulse 68   Temp 36.7 °C (98.1 °F) (Oral)   Resp 16   SpO2 99%

## 2021-12-13 NOTE — H&P
History & Physical       Patient: Marguerite Petersen    Date of Admission: No admission date for patient encounter.    YOB: 1970    Medical Record Number: [id number]    Attending Physician: Carolee Pleitez MD        Chief Complaints: Adhesive capsulitis of left shoulder [M75.02]      History of Present Illness: This patient is had a several month history of left shoulder pain she has an MRI which shows a partial rotator cuff tear but on exam which is consistent with capsulitis.  She has failed conservative management still has activity limiting loss of range of motion and she presents for manipulation.  She understands the inherent risk of fracture dislocation.  She also understands diabetes is a big associated factor and she needs to keep her blood sugars well under control     Allergies: No Known Allergies    Medications:   Home Medications:  No current facility-administered medications on file prior to encounter.     Current Outpatient Medications on File Prior to Encounter   Medication Sig   • ALPRAZolam (XANAX) 0.25 MG tablet Take 1 tablet by mouth 3 (Three) Times a Day As Needed.   • aspirin 81 MG tablet Take 81 mg by mouth Daily.   • citalopram (CeleXA) 40 MG tablet Take 60 mg by mouth Daily.   • DULoxetine (CYMBALTA) 60 MG capsule Take 60 mg by mouth Daily.   • Liraglutide (VICTOZA SC) Inject 1.2 mg under the skin into the appropriate area as directed Daily.   • meloxicam (MOBIC) 15 MG tablet 1 PO Daily with food.   • METOPROLOL TARTRATE PO Take 50 mg by mouth 2 (Two) Times a Day.   • omeprazole (priLOSEC) 20 MG capsule Take 20 mg by mouth Daily.   • ondansetron ODT (ZOFRAN-ODT) 8 MG disintegrating tablet Place 1 tablet on the tongue Every 8 (Eight) Hours As Needed for Nausea or Vomiting.   • oxyCODONE-acetaminophen (PERCOCET)  MG per tablet Take 1 tablet by mouth Every 6 (Six) Hours As Needed.   • topiramate (TOPAMAX) 200 MG tablet Take 200 mg by mouth Daily.   • TRAZODONE HCL PO Take  50 mg by mouth Every Night.   • ARIPiprazole (ABILIFY) 5 MG tablet Take 5 mg by mouth Daily.   • HYDROcodone-acetaminophen (NORCO) 5-325 MG per tablet Take 1 tablet by mouth Every 6 (Six) Hours As Needed for Severe Pain .   • HYDROcodone-acetaminophen (NORCO) 7.5-325 MG per tablet Take 1 tablet by mouth Every 6 (Six) Hours As Needed for Moderate Pain .   • LORazepam (ATIVAN) 0.5 MG tablet Take 0.5 mg by mouth Every 8 (Eight) Hours As Needed for Anxiety.   • methylPREDNISolone (MEDROL) 4 MG dose pack Take as directed on package instructions.   • SUMAtriptan (IMITREX) 6 MG/0.5ML solution injection Inject 6 mg under the skin 1 (One) Time As Needed for Migraine.     Current Medications:  Scheduled Meds:  Continuous Infusions:No current facility-administered medications for this encounter.    PRN Meds:.    Past Medical History:   Diagnosis Date   • Adhesive capsulitis of left shoulder 12/10/2021   • Ankle sprain    • Anxiety    • Atrial fibrillation (HCC)    • Diabetes mellitus (HCC)    • GERD (gastroesophageal reflux disease)    • Migraines    • Panic attacks    • Renal stone    • Wrist sprain         Past Surgical History:   Procedure Laterality Date   •  SECTION     • CHOLECYSTECTOMY     • EXTRACORPOREAL SHOCK WAVE LITHOTRIPSY (ESWL) Right 2021    Procedure: EXTRACORPOREAL SHOCKWAVE LITHOTRIPSY CYSTOSCOPY;  Surgeon: Sidney Medellin MD;  Location: [facility];  Service: Urology;  Laterality: Right;   • HYSTERECTOMY          Social History     Occupational History   • Not on file   Tobacco Use   • Smoking status: Never Smoker   • Smokeless tobacco: Never Used   Vaping Use   • Vaping Use: Never used   Substance and Sexual Activity   • Alcohol use: No   • Drug use: No   • Sexual activity: Not Currently     Partners: Male     Birth control/protection: Post-menopausal      Social History     Social History Narrative   • Not on file        Family History   Problem Relation Age of Onset   • Cancer Father     • Diabetes Mother    • Malig Hyperthermia Neg Hx        Review of Systems      Physical Exam: 51 y.o. female  General Appearance:    Alert, cooperative, in no acute distress                    There were no vitals filed for this visit.     Head:  Normocephalic, without obvious abnormality, atraumatic   Eyes:          Conjunctivae and sclerae normal, no pallor, corneas clear,    Ears:  Ears appear intact with no abnormalities noted   Throat: No oral lesions, no thrush, oral mucosa moist   Neck: No adenopathy, supple, trachea midline, no thyromegaly,    Back:   No kyphosis present, no scoliosis present, no skin lesions,      erythema or scars, no tenderness to percussion or                   palpation,range of motion normal   Lungs:   Clear to auscultation, respirations regular, even and                 unlabored    Heart:  Regular rhythm and normal rate               Chest Wall:  No abnormalities observed   Abdomen:   Normal bowel sounds, no masses, no organomegaly, soft    nontender, nondistended, no guarding, no rebound   tenderness   Rectal:   Deferred   Extremities:  Moves all extremities well, no edema,   no cyanosis, no redness   Pulses: Pulses palpable and equal bilaterally   Skin: No bleeding, bruising or rash   Lymph nodes: No palpable adenopathy   Neurologic: Appears neurologic intact             Assessment:  Patient Active Problem List   Diagnosis   • Right renal stone   • Adhesive capsulitis of left shoulder           Plan: All risks, benefits and alternatives were discussed.  Risks including but not exclusive to anesthetic complications, including death, MI, CVA, infection, bleeding DVT, PE,  fracture, residual pain and need for future surgery.  Patient understood all and agrees to proceed.

## 2021-12-13 NOTE — PATIENT INSTRUCTIONS
Access Code: [email or web address]  URL: [email or web address]  Date: 12/13/2021  Prepared by: May Villalta    Exercises  Seated Shoulder Abduction AAROM with Pulley Behind - 3 x daily - 7 x weekly - 3 sets - 10 reps - 10 hold  Seated Shoulder Flexion AAROM with Pulley Behind - 3 x daily - 7 x weekly - 3 sets - 10 reps - 10 hold  Standing Shoulder Internal Rotation Stretch with Towel - 3 x daily - 7 x weekly - 3 sets - 10 reps - 10 hold  Supine Shoulder Flexion AAROM with Hands Clasped - 3 x daily - 7 x weekly - 3 sets - 10 reps - 10 hold  Circular Shoulder Pendulum with Table Support - 3 x daily - 7 x weekly - 3 sets - 10 reps  Flexion-Extension Shoulder Pendulum with Table Support - 3 x daily - 7 x weekly - 3 sets - 10 reps  Horizontal Shoulder Pendulum with Table Support - 3 x daily - 7 x weekly - 3 sets - 10 reps  Pt was educated on findings of evaluation, purpose of treatment and goals for therapy. Treatment options discussed and questions answered. Pt was educated on exercises, self treatment and pain relief techniques.

## 2021-12-14 ENCOUNTER — TREATMENT (OUTPATIENT)
Dept: PHYSICAL THERAPY | Facility: CLINIC | Age: 51
End: 2021-12-14

## 2021-12-14 DIAGNOSIS — M79.602 LEFT ARM PAIN: ICD-10-CM

## 2021-12-14 DIAGNOSIS — Z74.09 LIMITED MOBILITY: ICD-10-CM

## 2021-12-14 DIAGNOSIS — M75.02 ADHESIVE CAPSULITIS OF LEFT SHOULDER: Primary | ICD-10-CM

## 2021-12-14 DIAGNOSIS — M25.512 CHRONIC LEFT SHOULDER PAIN: ICD-10-CM

## 2021-12-14 DIAGNOSIS — G89.29 CHRONIC LEFT SHOULDER PAIN: ICD-10-CM

## 2021-12-14 PROCEDURE — 97014 ELECTRIC STIMULATION THERAPY: CPT | Performed by: PHYSICAL THERAPIST

## 2021-12-14 PROCEDURE — 97140 MANUAL THERAPY 1/> REGIONS: CPT | Performed by: PHYSICAL THERAPIST

## 2021-12-14 PROCEDURE — 97110 THERAPEUTIC EXERCISES: CPT | Performed by: PHYSICAL THERAPIST

## 2021-12-14 NOTE — PROGRESS NOTES
Physical Therapy Daily Progress Note    Patient: Marguerite Petersen   : 1970  Diagnosis/ICD-10 Code:  No primary diagnosis found.  Referring practitioner: Dario Ortega*  Date of Initial Visit: No linked episodes  Today's Date: 2021  Patient seen for Visit count could not be calculated. Make sure you are using a visit which is associated with an episode. sessions           Subjective Doing ok. It's hurting some but different than before the manip. It woke me up about 4am with pain in side of arm. Before it was the whole shoulder and down to the hand. -5/10    Objective   See Exercise, Manual, and Modality Logs for complete treatment.   AAROM with walkout flexion: 0-116 deg  Education to make HEP a priority to avoid further issues.     Assessment/Plan Pt with good range into flexion, scaption, ER, and IR. Most limited in abduction and this appeared to be due more to pain than capsule but hard to distinguish with guarding.            Timed:    Manual Therapy:    11     mins  46177;  Therapeutic Exercise:    18     mins  80881;     Neuromuscular Angie:       mins  42291;    Therapeutic Activity:          mins  53759;     Gait Training:           mins  44579;     Ultrasound:          mins  09381;    Electrical Stimulation:         mins  83975 ( );  Iontophoresis         mins 67088;  Aquatic Therapy         mins 62261;  Dry Needling                   mins /  (Self-pay)    Untimed:  Electrical Stimulation:    15     mins  45092 ( );  Traction:         mins  22552;     Timed Treatment:   29   mins   Total Treatment:     45   mins    Frida Thao, PT  Physical Therapist    [place] License:[id number]

## 2021-12-15 ENCOUNTER — TREATMENT (OUTPATIENT)
Dept: PHYSICAL THERAPY | Facility: CLINIC | Age: 51
End: 2021-12-15

## 2021-12-15 DIAGNOSIS — G89.29 CHRONIC LEFT SHOULDER PAIN: ICD-10-CM

## 2021-12-15 DIAGNOSIS — Z74.09 LIMITED MOBILITY: ICD-10-CM

## 2021-12-15 DIAGNOSIS — M75.02 ADHESIVE CAPSULITIS OF LEFT SHOULDER: Primary | ICD-10-CM

## 2021-12-15 DIAGNOSIS — Z78.9 IMPAIRED ACTIVITIES OF DAILY LIVING: ICD-10-CM

## 2021-12-15 DIAGNOSIS — M25.512 CHRONIC LEFT SHOULDER PAIN: ICD-10-CM

## 2021-12-15 PROCEDURE — 97014 ELECTRIC STIMULATION THERAPY: CPT | Performed by: PHYSICAL THERAPIST

## 2021-12-15 PROCEDURE — 97140 MANUAL THERAPY 1/> REGIONS: CPT | Performed by: PHYSICAL THERAPIST

## 2021-12-15 PROCEDURE — 97110 THERAPEUTIC EXERCISES: CPT | Performed by: PHYSICAL THERAPIST

## 2021-12-15 NOTE — PROGRESS NOTES
Physical Therapy Daily Progress Note    Visit #3    Subjective     Marguerite Petersen reports: adherence with HEP last night.       Objective   See Exercise, Manual, and Modality Logs for complete treatment.       Assessment/Plan  Pt guarded with PROM efforts, but reports pain relief with distraction and oscillations. Good AAROM with TRX exercise.   Progress per Plan of Care           Manual Therapy:    10     mins  07461;  Therapeutic Exercise:    20     mins  96798;     Neuromuscular Angie:    0    mins  13678;    Therapeutic Activity:     0     mins  48827;     Gait Trainin     mins  22778;     Ultrasound:     0     mins  15378;    Electrical Stimulation:    15     mins  95110 ( );    Timed Treatment:   30   mins   Total Treatment:     45   mins    Cesilia De La Fuente, PT, DPT  Physical Therapist  [place] License #[id number]

## 2021-12-16 ENCOUNTER — TREATMENT (OUTPATIENT)
Dept: PHYSICAL THERAPY | Facility: CLINIC | Age: 51
End: 2021-12-16

## 2021-12-16 DIAGNOSIS — M75.02 ADHESIVE CAPSULITIS OF LEFT SHOULDER: Primary | ICD-10-CM

## 2021-12-16 DIAGNOSIS — M25.512 CHRONIC LEFT SHOULDER PAIN: ICD-10-CM

## 2021-12-16 DIAGNOSIS — Z74.09 LIMITED MOBILITY: ICD-10-CM

## 2021-12-16 DIAGNOSIS — G89.29 CHRONIC LEFT SHOULDER PAIN: ICD-10-CM

## 2021-12-16 PROCEDURE — 97530 THERAPEUTIC ACTIVITIES: CPT | Performed by: PHYSICAL THERAPIST

## 2021-12-16 PROCEDURE — 97110 THERAPEUTIC EXERCISES: CPT | Performed by: PHYSICAL THERAPIST

## 2021-12-16 PROCEDURE — 97140 MANUAL THERAPY 1/> REGIONS: CPT | Performed by: PHYSICAL THERAPIST

## 2021-12-16 NOTE — PROGRESS NOTES
Physical Therapy Daily Progress Note    Patient: Marguerite Petersen   : 1970  Diagnosis/ICD-10 Code:  Adhesive capsulitis of left shoulder [M75.02]  Referring practitioner: Dario Ortega*  Date of Initial Visit: Type: THERAPY  Noted: 2021  Today's Date: 2021  Patient seen for 4 sessions         Marguerite Petersen reports: in more pain today, did not sleep well last night.    Subjective     Objective   See Exercise, Manual, and Modality Logs for complete treatment.       Assessment/Plan  Subjectively, pt reports no increase of pain or discomfort with interventions performed today. Performed well with continued shoulder mobility interventions with good AAROM. Continues to demonstrate difficulty and discomfort with PROM with muscle guarding and difficulty relaxing. Continues to benefit from verbal/tactile cues to ensure proper form and technique for exercise performance.     Progress per Plan of Care           Manual Therapy:    18     mins  04826;  Therapeutic Exercise:    10     mins  98390;     Neuromuscular Angie:        mins  89535;    Therapeutic Activity:     10     mins  94140;     Gait Training:           mins  23230;     Ultrasound:          mins  51925;    Electrical Stimulation:    15     mins  54184 ( );  Dry Needling          mins self-pay    Timed Treatment:   38   mins   Total Treatment:     53   mins    Zunilda Matamoros PTA  Physical Therapist Assistant [phone]

## 2021-12-17 ENCOUNTER — TREATMENT (OUTPATIENT)
Dept: PHYSICAL THERAPY | Facility: CLINIC | Age: 51
End: 2021-12-17

## 2021-12-17 DIAGNOSIS — G89.29 CHRONIC LEFT SHOULDER PAIN: ICD-10-CM

## 2021-12-17 DIAGNOSIS — M75.02 ADHESIVE CAPSULITIS OF LEFT SHOULDER: Primary | ICD-10-CM

## 2021-12-17 DIAGNOSIS — Z78.9 IMPAIRED ACTIVITIES OF DAILY LIVING: ICD-10-CM

## 2021-12-17 DIAGNOSIS — Z74.09 LIMITED MOBILITY: ICD-10-CM

## 2021-12-17 DIAGNOSIS — M25.512 CHRONIC LEFT SHOULDER PAIN: ICD-10-CM

## 2021-12-17 PROCEDURE — 97110 THERAPEUTIC EXERCISES: CPT | Performed by: PHYSICAL THERAPIST

## 2021-12-17 PROCEDURE — 97530 THERAPEUTIC ACTIVITIES: CPT | Performed by: PHYSICAL THERAPIST

## 2021-12-17 PROCEDURE — 97014 ELECTRIC STIMULATION THERAPY: CPT | Performed by: PHYSICAL THERAPIST

## 2021-12-17 PROCEDURE — 97140 MANUAL THERAPY 1/> REGIONS: CPT | Performed by: PHYSICAL THERAPIST

## 2021-12-22 NOTE — PROGRESS NOTES
Shoulder manipulation follow up  Patient: Marguerite Petersen        YOB: 1970      Chief Complaints: shoulder pain left      History of Present Illness: Pt is here f/u shoulder manipulation.  On the left she states she doing much better she has about 25% of her original symptoms remaining overall very happy  Allergies: No Known Allergies    Medications:   Home Medications:  Current Outpatient Medications on File Prior to Visit   Medication Sig   • ALPRAZolam (XANAX) 0.25 MG tablet Take 1 tablet by mouth 3 (Three) Times a Day As Needed.   • aspirin 81 MG tablet Take 81 mg by mouth Daily.   • citalopram (CeleXA) 40 MG tablet Take 60 mg by mouth Daily.   • DULoxetine (CYMBALTA) 60 MG capsule Take 60 mg by mouth Daily.   • Liraglutide (VICTOZA SC) Inject 1.2 mg under the skin into the appropriate area as directed Daily.   • meloxicam (MOBIC) 15 MG tablet 1 PO Daily with food.   • METOPROLOL TARTRATE PO Take 50 mg by mouth 2 (Two) Times a Day.   • omeprazole (priLOSEC) 20 MG capsule Take 20 mg by mouth Daily.   • ondansetron ODT (ZOFRAN-ODT) 8 MG disintegrating tablet Place 1 tablet on the tongue Every 8 (Eight) Hours As Needed for Nausea or Vomiting.   • oxyCODONE-acetaminophen (PERCOCET)  MG per tablet Take 1 tablet by mouth Every 6 (Six) Hours As Needed.   • topiramate (TOPAMAX) 200 MG tablet Take 200 mg by mouth Daily.   • TRAZODONE HCL PO Take 50 mg by mouth Every Night.     No current facility-administered medications on file prior to visit.     Current Medications:  Scheduled Meds:  Continuous Infusions:No current facility-administered medications for this visit.    PRN Meds:.          Physical Exam: 51 y.o. female  General Appearance:    Alert, cooperative, in no acute distress                 There were no vitals filed for this visit.   Patient is alert and oriented ×3 no acute distress normal mood physical exam.  Physical exam of the shoulder, incisions looked good there is no erythema,no signs  or sx of infection.  Physical exam of the left shoulder reveals no overlying skin changes no lymphedema no lymphadenopathy.  Patient has active flexion 175 with mild symptoms abduction is similar external rotation is to 40 and internal rotation to the lower lumbar spine with mild symptoms.  Patient has good rotator cuff strength 4+ over 5 with isometric strength testing with pain.  Patient has a positive impingement and a positive Malik sign.  Patient has good cervical range of motion which is full and asymptomatic no radicular symptoms.  Patient has a normal elbow exam.  Good distal pulses are presentPatient has pain with overhead activity and a positive Neer sign and a positive empty can sign  They have a positive drop arm any definitive painful arc    Assessment  S/P shoulder manipulation      Plan: To continue physical therapy, continue working on range of motion and strength I think she is doing great I will have her continue therapy I will see her back in 4 weeks if in 4 weeks she is doing fine she is happy with where she is female call and cancel if she is still symptomatic we will have her get back in

## 2021-12-23 ENCOUNTER — TREATMENT (OUTPATIENT)
Dept: PHYSICAL THERAPY | Facility: CLINIC | Age: 51
End: 2021-12-23

## 2021-12-23 ENCOUNTER — OFFICE VISIT (OUTPATIENT)
Dept: ORTHOPEDIC SURGERY | Facility: CLINIC | Age: 51
End: 2021-12-23

## 2021-12-23 VITALS — HEIGHT: 62 IN | TEMPERATURE: 97.3 F | BODY MASS INDEX: 27.42 KG/M2 | WEIGHT: 149 LBS

## 2021-12-23 DIAGNOSIS — Z74.09 LIMITED MOBILITY: ICD-10-CM

## 2021-12-23 DIAGNOSIS — M75.02 ADHESIVE CAPSULITIS OF LEFT SHOULDER: Primary | ICD-10-CM

## 2021-12-23 DIAGNOSIS — Z78.9 IMPAIRED ACTIVITIES OF DAILY LIVING: ICD-10-CM

## 2021-12-23 DIAGNOSIS — G89.29 CHRONIC LEFT SHOULDER PAIN: ICD-10-CM

## 2021-12-23 DIAGNOSIS — M25.512 CHRONIC LEFT SHOULDER PAIN: ICD-10-CM

## 2021-12-23 PROCEDURE — 97110 THERAPEUTIC EXERCISES: CPT | Performed by: PHYSICAL THERAPIST

## 2021-12-23 PROCEDURE — 97140 MANUAL THERAPY 1/> REGIONS: CPT | Performed by: PHYSICAL THERAPIST

## 2021-12-23 PROCEDURE — 99024 POSTOP FOLLOW-UP VISIT: CPT | Performed by: ORTHOPAEDIC SURGERY

## 2021-12-23 NOTE — PROGRESS NOTES
Physical Therapy Daily Treatment Note      Patient: Marguerite Petersen   : 1970  Referring practitioner: Dario Ortega*  Date of Initial Visit: Type: THERAPY  Noted: 2021  Today's Date: 2021  Patient seen for 6 sessions       Visit Diagnoses:    ICD-10-CM ICD-9-CM   1. Adhesive capsulitis of left shoulder  M75.02 726.0   2. Chronic left shoulder pain  M25.512 719.41    G89.29 338.29   3. Limited mobility  Z74.09 V49.89   4. Impaired activities of daily living  Z78.9 V49.89       Subjective   Shoulder is ok during the day. Hurts more at night. Using pulley at home.     Objective   L shoulder PROM    IR to stomach  Flex 165  Abd 115  ER 45    AROM  Flex 136  Abd 104  ER reach C7  IR reach to back pocket    See Exercise, Manual, and Modality Logs for complete treatment.     Assessment/Plan  Motion significantly improved since evaluation 21. Still lacking AROM WNL. Tolerates exercise well. Encouraged attendance at PT and compliance with HEP.    Timed:         Manual Therapy:    15     mins  40023;     Therapeutic Exercise:    25     mins  36869;     Neuromuscular Angie:    0    mins  55608;    Therapeutic Activity:     0     mins  51427;     Gait Trainin     mins  54336;     Ultrasound:     0     mins  37017;    Ionto                               0    mins   89132        Timed Treatment:   40   mins   Total Treatment:     45   mins    FAYE Vanegas License: [id number]

## 2021-12-28 ENCOUNTER — TELEPHONE (OUTPATIENT)
Dept: PHYSICAL THERAPY | Facility: CLINIC | Age: 51
End: 2021-12-28

## 2022-01-03 ENCOUNTER — HOSPITAL ENCOUNTER (EMERGENCY)
Facility: HOSPITAL | Age: 52
Discharge: LEFT BEFORE DISCHARGE | End: 2022-01-03

## 2022-01-03 VITALS
TEMPERATURE: 97.9 F | DIASTOLIC BLOOD PRESSURE: 74 MMHG | OXYGEN SATURATION: 99 % | RESPIRATION RATE: 22 BRPM | SYSTOLIC BLOOD PRESSURE: 135 MMHG | HEART RATE: 90 BPM

## 2022-01-03 PROCEDURE — 99211 OFF/OP EST MAY X REQ PHY/QHP: CPT

## 2022-01-03 NOTE — ED NOTES
Patient from home via EMS; call was for a panic attack. Hx of panic attacks and anxiety. Patient is prescribed xanax and had ran out of prescription.      Jesica Alvarez, CARYL  01/03/22 [id number]

## 2022-01-10 ENCOUNTER — TELEPHONE (OUTPATIENT)
Dept: PHYSICAL THERAPY | Facility: CLINIC | Age: 52
End: 2022-01-10

## 2022-03-15 ENCOUNTER — OFFICE VISIT (OUTPATIENT)
Dept: ORTHOPEDIC SURGERY | Facility: CLINIC | Age: 52
End: 2022-03-15

## 2022-03-15 ENCOUNTER — TELEPHONE (OUTPATIENT)
Dept: ORTHOPEDIC SURGERY | Facility: CLINIC | Age: 52
End: 2022-03-15

## 2022-03-15 VITALS — BODY MASS INDEX: 27.05 KG/M2 | TEMPERATURE: 96.4 F | HEIGHT: 62 IN | WEIGHT: 147 LBS

## 2022-03-15 DIAGNOSIS — M75.112 PARTIAL NONTRAUMATIC TEAR OF ROTATOR CUFF, LEFT: ICD-10-CM

## 2022-03-15 DIAGNOSIS — M75.02 ADHESIVE CAPSULITIS OF LEFT SHOULDER: Primary | ICD-10-CM

## 2022-03-15 PROCEDURE — 99214 OFFICE O/P EST MOD 30 MIN: CPT | Performed by: NURSE PRACTITIONER

## 2022-03-15 RX ORDER — PRAVASTATIN SODIUM 20 MG
20 TABLET ORAL EVERY EVENING
COMMUNITY
Start: 2022-02-27 | End: 2022-03-23 | Stop reason: SDUPTHER

## 2022-03-15 RX ORDER — TIZANIDINE 4 MG/1
TABLET ORAL
COMMUNITY
Start: 2022-02-25 | End: 2022-04-28

## 2022-03-15 NOTE — TELEPHONE ENCOUNTER
ROBERTA WANTS THIS PATIENT TO SEE HER BEFORE 3/29 AND SHE DOES NOT HAVE ANY OPENINGS UNTIL 4/15. PLEASE ADVISE.

## 2022-03-17 PROCEDURE — 20610 DRAIN/INJ JOINT/BURSA W/O US: CPT | Performed by: NURSE PRACTITIONER

## 2022-03-17 RX ORDER — METHYLPREDNISOLONE ACETATE 80 MG/ML
80 INJECTION, SUSPENSION INTRA-ARTICULAR; INTRALESIONAL; INTRAMUSCULAR; SOFT TISSUE
Status: COMPLETED | OUTPATIENT
Start: 2022-03-17 | End: 2022-03-17

## 2022-03-17 RX ORDER — LIDOCAINE HYDROCHLORIDE 20 MG/ML
4 INJECTION, SOLUTION EPIDURAL; INFILTRATION; INTRACAUDAL; PERINEURAL
Status: COMPLETED | OUTPATIENT
Start: 2022-03-17 | End: 2022-03-17

## 2022-03-17 RX ADMIN — LIDOCAINE HYDROCHLORIDE 4 ML: 20 INJECTION, SOLUTION EPIDURAL; INFILTRATION; INTRACAUDAL; PERINEURAL at 11:36

## 2022-03-17 RX ADMIN — METHYLPREDNISOLONE ACETATE 80 MG: 80 INJECTION, SUSPENSION INTRA-ARTICULAR; INTRALESIONAL; INTRAMUSCULAR; SOFT TISSUE at 11:36

## 2022-03-17 NOTE — PROGRESS NOTES
[facility]              Follow Up      Patient Name: Marguerite Petersen  : 1970  Primary Care Physician: Marielena Crews MD        Chief Complaint: Left shoulder pain    HPI:   Marguerite Petersen is a 51 y.o. year old who presents today for evaluation of left shoulder pain.  Patient is coming in today for follow-up on her left shoulder and really left upper extremity.  I last saw her back in November for adhesive capsulitis, she also had a rotator cuff tear partial tear.  I referred her to Dr. Pleitez who ultimately ended up manipulating the shoulder, at their follow-up visit patient had gotten quite a bit better and only had about 25% of her symptoms remaining.  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  She says now today her pain really is sort of in the posterior aspect of her shoulder, does radiate some into her neck and into the elbow.  She says occasionally she has some numbness and tingling that goes down to her wrist.  She has a history of diabetes, she has taken anti-inflammatories including meloxicam and over-the-counter medicines.  She is also takes occasional pain medicine without much relief in her symptoms.    She is also been recently diagnosed with tennis elbow is wearing a tennis elbow strap on the left upper extremity.      Past Medical/Surgical, Social and Family History:  I have reviewed and/or updated pertinent history as noted in the medical record including:  Past Medical History:   Diagnosis Date   • Adhesive capsulitis of left shoulder 12/10/2021   • Ankle sprain    • Anxiety    • Atrial fibrillation (HCC)    • Diabetes mellitus (HCC)    • GERD (gastroesophageal reflux disease)    • Migraines    • Panic attacks    • Renal stone    • Wrist sprain      Past Surgical History:   Procedure Laterality Date   •  SECTION     • CHOLECYSTECTOMY      • EXTRACORPOREAL SHOCK WAVE LITHOTRIPSY (ESWL) Right 6/2/2021    Procedure: EXTRACORPOREAL SHOCKWAVE LITHOTRIPSY CYSTOSCOPY;  Surgeon: Sidney Medellin MD;  [place] [facility] [place] [facility] [place]  Service: Urology;  Laterality: Right;   • HYSTERECTOMY     • JOINT MANIPULATION Left 12/13/2021    Procedure: SHOULDER MANIPULATION;  Surgeon: Carolee Pleitez MD;  [place] [facility] [place] [facility] [place]  Service: Orthopedics;  Laterality: Left;     Social History     Occupational History   • Not on file   Tobacco Use   • Smoking status: Never Smoker   • Smokeless tobacco: Never Used   Vaping Use   • Vaping Use: Never used   Substance and Sexual Activity   • Alcohol use: No   • Drug use: No   • Sexual activity: Not Currently     Partners: Male     Birth control/protection: Post-menopausal      Social History     Social History Narrative   • Not on file     Family History   Problem Relation Age of Onset   • Cancer Father    • Diabetes Mother    • Malig Hyperthermia Neg Hx        Allergies: No Known Allergies    Medications:   Home Medications:  Current Outpatient Medications on File Prior to Visit   Medication Sig   • ALPRAZolam (XANAX) 0.25 MG tablet Take 1 tablet by mouth 3 (Three) Times a Day As Needed.   • aspirin 81 MG tablet Take 81 mg by mouth Daily.   • citalopram (CeleXA) 40 MG tablet Take 60 mg by mouth Daily.   • DULoxetine (CYMBALTA) 60 MG capsule Take 60 mg by mouth Daily.   • meloxicam (MOBIC) 15 MG tablet 1 PO Daily with food.   • METOPROLOL TARTRATE PO Take 50 mg by mouth 2 (Two) Times a Day.   • omeprazole (priLOSEC) 20 MG capsule Take 20 mg by mouth Daily.   • pravastatin (PRAVACHOL) 20 MG tablet Take 20 mg by mouth Every Evening.   • topiramate (TOPAMAX) 200 MG tablet Take 200 mg by mouth Daily.   • TRAZODONE HCL PO Take 50 mg by mouth Every Night.   • Liraglutide (VICTOZA SC) Inject 1.2 mg under the skin into the appropriate area as directed Daily.   • ondansetron ODT (ZOFRAN-ODT) 8 MG disintegrating tablet Place 1 tablet  on the tongue Every 8 (Eight) Hours As Needed for Nausea or Vomiting.   • oxyCODONE-acetaminophen (PERCOCET)  MG per tablet Take 1 tablet by mouth Every 6 (Six) Hours As Needed.   • tiZANidine (ZANAFLEX) 4 MG tablet TAKE 1 TABLET BY MOUTH EVERY 8 HOURS AS NEEDED FOR MUSCLE TIGHTNESS FOR UP TO 10 DAYS     No current facility-administered medications on file prior to visit.         ROS:  ROS negative except as listed in the HPI.    Physical Exam:   51 y.o. female  Body mass index is 26.89 kg/m²., 66.7 kg (147 lb)  Vitals:    03/15/22 1334   Temp: 96.4 °F (35.8 °C)     General: Alert, cooperative, appears well and in no observable distress. Appears stated age and BMI as listed above.  HEENT: Normocephalic, atraumatic on external visual inspection.  CV: No significant peripheral edema.  Respiratory: Normal respiratory effort.  Skin: Warm & well perfused; appropriate skin turgor.  Psych: Appropriate mood & affect.  Neuro: Gross sensation and motor intact in affected extremity/extremities.  Vascular: Peripheral pulses palpable in affected extremity/extremities.     MSK Exam:  LEFT Shoulder  No obvious deformity or wounds, tenderness:posterior acromial, trapezius muscle, AROM:Flexion: 170; ER: 40; IR: SI region, +firm endpoint in ROM, Cuff Strength: ER 4+; IR 4+; Supraspinatus 4+ and painful with isometric testing    RIGHT Shoulder  No deformity or wounds.  No tenderness to palpation.  Full, painless AROM.  Cuff Strength 5/5 with ER, IR & Supraspinatus testing.  Negative provocative testing.      Cervical exam is grossly normal for patient's age. ROM is smooth and without pain, no clear radicular pattern is noted. Elbow exam reveals no abnormality with normal painless ROM.  I am not able to reproduce any clear signs of tennis elbow today on the lateral left elbow.  She does have some deep tenderness in the musculature there.       Radiology:    No new images were needed at the visit today.  She has had prior imaging  of the shoulder and cervical spine including an MRI of the left shoulder.  I reviewed those at the visit today.  Really her cervical spine x-rays are largely unremarkable.    Procedure:   Patient has a history of diabetes and therefore may be at increased risk of hyperglycemia following an injection of corticosteroid. These increases in BG are usually transient and resolve within a few days. Patient was advised on the potential for hyperglycemia and encouraged to self monitor for s/sx hyperglycemia and to self monitor BG. Patient encouraged to call the office for any significant hyperglycemia and/or hyperglycemia that does not resolve within 3-5 days.     Large Joint Arthrocentesis: L glenohumeral  Date/Time: 3/17/2022 11:36 AM  Consent given by: patient  Site marked: site marked  Timeout: Immediately prior to procedure a time out was called to verify the correct patient, procedure, equipment, support staff and site/side marked as required   Supporting Documentation  Indications: pain   Procedure Details  Location: shoulder - L glenohumeral  Preparation: Patient was prepped and draped in the usual sterile fashion  Needle gauge: 21G.  Approach: posterior  Medications administered: 80 mg methylPREDNISolone acetate 80 MG/ML; 4 mL lidocaine PF 2% 2 %  Patient tolerance: patient tolerated the procedure well with no immediate complications          Misc. Data/Labs: N/A    Assessment & Plan:    This is a 51-year-old female with ongoing and recurrent left shoulder pain.  I still suspicious that this is capsulitis, her motion is really pretty limited again.  I suspect that because she was unable to continue with her therapy on a regular basis the capsulitis may have returned a bit.  She does have some symptoms which are little concerning for cervical spine pathology but her neck exam really did not reproduce any symptoms.  Additionally really not able to reproduce specific signs of tennis elbow, she could have irritation of  the deep branch of the radial nerve.  Plan is to proceed with a glenohumeral injection today as a diagnostic and therapeutic tool, I do think this can give her some relief in her shoulder and then really need to get her back into physical therapy working on stretching.  We will certainly consider additional work-up as needed.  Like to see her back in about 2 weeks just to see how much progress she is made after the injection and then will go from there.    Injection provided in the office today. Tolerated well with no immediate complications. Injection precautions discussed with the patient., Patient encouraged to call with questions or concerns prior to follow up. , Patient instructed on appropriate use of NSAIDs and signs/symptoms of adverse reactions. Patient advised to stop medications and notify the office in the event of any noted side effects.  and Patient instructed on appropriate use of ICE and/or HEAT therapy, alternating for symptoms.       ICD-10-CM ICD-9-CM   1. Adhesive capsulitis of left shoulder  M75.02 726.0   2. Partial nontraumatic tear of rotator cuff, left  M75.112 726.13     No orders of the defined types were placed in this encounter.    No orders of the defined types were placed in this encounter.    Return in about 2 weeks (around 3/29/2022) for Recheck.    SAMI Henning      Dictated Utilizing Dragon Dictation

## 2022-03-23 ENCOUNTER — OFFICE VISIT (OUTPATIENT)
Dept: INTERNAL MEDICINE | Facility: CLINIC | Age: 52
End: 2022-03-23

## 2022-03-23 ENCOUNTER — LAB (OUTPATIENT)
Dept: LAB | Facility: HOSPITAL | Age: 52
End: 2022-03-23

## 2022-03-23 VITALS
HEIGHT: 62 IN | OXYGEN SATURATION: 99 % | HEART RATE: 76 BPM | WEIGHT: 147.3 LBS | SYSTOLIC BLOOD PRESSURE: 110 MMHG | BODY MASS INDEX: 27.1 KG/M2 | DIASTOLIC BLOOD PRESSURE: 70 MMHG

## 2022-03-23 DIAGNOSIS — Z79.4 TYPE 2 DIABETES MELLITUS WITH OTHER CIRCULATORY COMPLICATION, WITH LONG-TERM CURRENT USE OF INSULIN: ICD-10-CM

## 2022-03-23 DIAGNOSIS — Z12.11 COLON CANCER SCREENING: Primary | ICD-10-CM

## 2022-03-23 DIAGNOSIS — F41.9 ANXIETY: ICD-10-CM

## 2022-03-23 DIAGNOSIS — E78.2 MIXED HYPERLIPIDEMIA: ICD-10-CM

## 2022-03-23 DIAGNOSIS — Z12.31 ENCOUNTER FOR SCREENING MAMMOGRAM FOR MALIGNANT NEOPLASM OF BREAST: ICD-10-CM

## 2022-03-23 DIAGNOSIS — Z00.00 HEALTHCARE MAINTENANCE: ICD-10-CM

## 2022-03-23 DIAGNOSIS — F51.01 PRIMARY INSOMNIA: ICD-10-CM

## 2022-03-23 DIAGNOSIS — K21.00 GASTROESOPHAGEAL REFLUX DISEASE WITH ESOPHAGITIS WITHOUT HEMORRHAGE: ICD-10-CM

## 2022-03-23 DIAGNOSIS — E11.59 TYPE 2 DIABETES MELLITUS WITH OTHER CIRCULATORY COMPLICATION, WITH LONG-TERM CURRENT USE OF INSULIN: ICD-10-CM

## 2022-03-23 DIAGNOSIS — G43.C0 PERIODIC HEADACHE SYNDROME, NOT INTRACTABLE: ICD-10-CM

## 2022-03-23 LAB
ALBUMIN SERPL-MCNC: 4.8 G/DL (ref 3.5–5.2)
ALBUMIN UR-MCNC: 1.2 MG/DL
ALBUMIN/GLOB SERPL: 1.7 G/DL
ALP SERPL-CCNC: 125 U/L (ref 39–117)
ALT SERPL W P-5'-P-CCNC: 22 U/L (ref 1–33)
ANION GAP SERPL CALCULATED.3IONS-SCNC: 12 MMOL/L (ref 5–15)
AST SERPL-CCNC: 18 U/L (ref 1–32)
BILIRUB SERPL-MCNC: 0.4 MG/DL (ref 0–1.2)
BUN SERPL-MCNC: 17 MG/DL (ref 6–20)
BUN/CREAT SERPL: 14.2 (ref 7–25)
CALCIUM SPEC-SCNC: 9.6 MG/DL (ref 8.6–10.5)
CHLORIDE SERPL-SCNC: 107 MMOL/L (ref 98–107)
CHOLEST SERPL-MCNC: 164 MG/DL (ref 0–200)
CO2 SERPL-SCNC: 21 MMOL/L (ref 22–29)
CREAT SERPL-MCNC: 1.2 MG/DL (ref 0.57–1)
CREAT UR-MCNC: 210.6 MG/DL
EGFRCR SERPLBLD CKD-EPI 2021: 54.2 ML/MIN/1.73
GLOBULIN UR ELPH-MCNC: 2.8 GM/DL
GLUCOSE SERPL-MCNC: 133 MG/DL (ref 65–99)
HBA1C MFR BLD: 6.8 % (ref 4.8–5.6)
HDLC SERPL-MCNC: 41 MG/DL (ref 40–60)
LDLC SERPL CALC-MCNC: 95 MG/DL (ref 0–100)
LDLC/HDLC SERPL: 2.23 {RATIO}
MICROALBUMIN/CREAT UR: 5.7 MG/G
POTASSIUM SERPL-SCNC: 4.2 MMOL/L (ref 3.5–5.2)
PROT SERPL-MCNC: 7.6 G/DL (ref 6–8.5)
SODIUM SERPL-SCNC: 140 MMOL/L (ref 136–145)
TRIGL SERPL-MCNC: 157 MG/DL (ref 0–150)
VLDLC SERPL-MCNC: 28 MG/DL (ref 5–40)

## 2022-03-23 PROCEDURE — 80061 LIPID PANEL: CPT | Performed by: FAMILY MEDICINE

## 2022-03-23 PROCEDURE — 2014F MENTAL STATUS ASSESS: CPT | Performed by: FAMILY MEDICINE

## 2022-03-23 PROCEDURE — 3008F BODY MASS INDEX DOCD: CPT | Performed by: FAMILY MEDICINE

## 2022-03-23 PROCEDURE — 99214 OFFICE O/P EST MOD 30 MIN: CPT | Performed by: FAMILY MEDICINE

## 2022-03-23 PROCEDURE — 82043 UR ALBUMIN QUANTITATIVE: CPT | Performed by: FAMILY MEDICINE

## 2022-03-23 PROCEDURE — 99396 PREV VISIT EST AGE 40-64: CPT | Performed by: FAMILY MEDICINE

## 2022-03-23 PROCEDURE — 82570 ASSAY OF URINE CREATININE: CPT | Performed by: FAMILY MEDICINE

## 2022-03-23 PROCEDURE — 36415 COLL VENOUS BLD VENIPUNCTURE: CPT | Performed by: FAMILY MEDICINE

## 2022-03-23 PROCEDURE — 80053 COMPREHEN METABOLIC PANEL: CPT | Performed by: FAMILY MEDICINE

## 2022-03-23 PROCEDURE — 83036 HEMOGLOBIN GLYCOSYLATED A1C: CPT | Performed by: FAMILY MEDICINE

## 2022-03-23 RX ORDER — INSULIN GLARGINE 100 [IU]/ML
15 INJECTION, SOLUTION SUBCUTANEOUS DAILY
Qty: 5 PEN | Refills: 3 | Status: SHIPPED | OUTPATIENT
Start: 2022-03-23 | End: 2022-04-28

## 2022-03-23 RX ORDER — PRAVASTATIN SODIUM 20 MG
20 TABLET ORAL EVERY EVENING
Qty: 90 TABLET | Refills: 2 | Status: SHIPPED | OUTPATIENT
Start: 2022-03-23 | End: 2023-01-04

## 2022-03-23 RX ORDER — OMEPRAZOLE 20 MG/1
20 CAPSULE, DELAYED RELEASE ORAL DAILY
Qty: 90 CAPSULE | Refills: 2 | Status: SHIPPED | OUTPATIENT
Start: 2022-03-23 | End: 2022-12-19

## 2022-03-23 RX ORDER — DULOXETIN HYDROCHLORIDE 30 MG/1
30 CAPSULE, DELAYED RELEASE ORAL DAILY
Qty: 90 CAPSULE | Refills: 2 | Status: SHIPPED | OUTPATIENT
Start: 2022-03-23 | End: 2022-06-01

## 2022-03-23 RX ORDER — DULOXETIN HYDROCHLORIDE 60 MG/1
60 CAPSULE, DELAYED RELEASE ORAL DAILY
Qty: 90 CAPSULE | Refills: 2 | Status: SHIPPED | OUTPATIENT
Start: 2022-03-23 | End: 2022-06-01 | Stop reason: SDUPTHER

## 2022-03-23 RX ORDER — INSULIN GLARGINE 100 [IU]/ML
15 INJECTION, SOLUTION SUBCUTANEOUS 2 TIMES DAILY
COMMUNITY
Start: 2022-02-09 | End: 2022-03-23 | Stop reason: SDUPTHER

## 2022-03-23 RX ORDER — ALPRAZOLAM 0.25 MG/1
0.25 TABLET ORAL 3 TIMES DAILY PRN
Qty: 30 TABLET | Refills: 0 | Status: SHIPPED | OUTPATIENT
Start: 2022-03-23 | End: 2022-06-01

## 2022-03-23 NOTE — PROGRESS NOTES
"Chief Complaint   anxiety GERD insomnia hyperlipidemia diabetes and headache    Subjective          Marguerite Petersen presents to [facility]  History of Present Illness  New patient appointment to discuss ongoing management of chronic medical problems anxiety GERD insomnia hyperlipidemia diabetes and headache she needs refills on her medications and she feels that her insomnia is not controlled mostly due to increased stress regarding the relationship of her and her mother and that she is taking care of her she does not have any assistance with her siblings her sugars have been under fairly good control as well as her GERD symptoms with omeprazole she is followed by Tabby Pleitez for chronic shoulder pain and adhesive capsulitis,  She is taking metoprolol for history of atrial fibrillation and no problems since initiating  Objective   Vital Signs:   /70 (BP Location: Right arm, Patient Position: Sitting, Cuff Size: Adult)   Pulse 76   Ht 157.5 cm (62\")   Wt 66.8 kg (147 lb 4.8 oz)   SpO2 99%   BMI 26.94 kg/m²            Physical Exam  Vitals and nursing note reviewed.   Constitutional:       Appearance: Normal appearance. She is well-developed. She is not diaphoretic.   HENT:      Head: Normocephalic and atraumatic.   Eyes:      General: Lids are normal. No scleral icterus.     Extraocular Movements: Extraocular movements intact.      Conjunctiva/sclera: Conjunctivae normal.   Neck:      Thyroid: No thyroid mass or thyromegaly.      Vascular: No carotid bruit or JVD.   Cardiovascular:      Rate and Rhythm: Normal rate and regular rhythm.      Pulses: Normal pulses.           Radial pulses are 2+ on the right side and 2+ on the left side.      Heart sounds: Normal heart sounds. No murmur heard.  Pulmonary:      Effort: Pulmonary effort is normal. No respiratory distress.      Breath sounds: Normal breath sounds.   Abdominal:      Palpations: Abdomen is soft.   Musculoskeletal:     "  Cervical back: Normal range of motion.      Right lower leg: No edema.      Left lower leg: No edema.   Skin:     General: Skin is warm and dry.      Coloration: Skin is not pale.      Findings: No erythema or rash.   Neurological:      General: No focal deficit present.      Mental Status: She is alert and oriented to person, place, and time.      Sensory: No sensory deficit.      Deep Tendon Reflexes: Reflexes are normal and symmetric.   Psychiatric:         Mood and Affect: Mood normal.         Behavior: Behavior normal. Behavior is cooperative.         Thought Content: Thought content normal.         Judgment: Judgment normal.        Result Review :     Common labs    Common Labsle 7/11/21 12/10/21 12/10/21 12/10/21 3/23/22 3/23/22 3/23/22 3/23/22     1543 1543 1543 0826 0826 0826 0826   Glucose 341 (A)  146 (A)    133 (A)    BUN 9  9    17    Creatinine 0.86  1.02 (A)    1.20 (A)    eGFR Non  Am 69  57 (A)        Sodium 136  140    140    Potassium 4.1  3.8    4.2    Chloride 104  107    107    Calcium 9.2  9.7    9.6    Albumin       4.80    Total Bilirubin       0.4    Alkaline Phosphatase       125 (A)    AST (SGOT)       18    ALT (SGPT)       22    WBC  9.68         Hemoglobin  15.5         Hematocrit  45.8         Platelets  214         Total Cholesterol        164   Triglycerides        157 (A)   HDL Cholesterol        41   LDL Cholesterol         95   Hemoglobin A1C    6.80 (A) 6.80 (A)      Microalbumin, Urine      1.2     (A) Abnormal value                      Assessment and Plan    Diagnoses and all orders for this visit:    1. Colon cancer screening (Primary)  -     Ambulatory Referral For Screening Colonoscopy    2. Encounter for screening mammogram for malignant neoplasm of breast  -     Mammo Screening Bilateral With CAD; Future    3. Healthcare maintenance    4. Anxiety  -     ALPRAZolam (XANAX) 0.25 MG tablet; Take 1 tablet by mouth 3 (Three) Times a Day As Needed for Anxiety.   Dispense: 30 tablet; Refill: 0  -     DULoxetine (CYMBALTA) 60 MG capsule; Take 1 capsule by mouth Daily.  Dispense: 90 capsule; Refill: 2  -     DULoxetine (CYMBALTA) 30 MG capsule; Take 1 capsule by mouth Daily.  Dispense: 90 capsule; Refill: 2    5. Gastroesophageal reflux disease with esophagitis without hemorrhage  -     omeprazole (priLOSEC) 20 MG capsule; Take 1 capsule by mouth Daily.  Dispense: 90 capsule; Refill: 2    6. Primary insomnia    7. Mixed hyperlipidemia  -     pravastatin (PRAVACHOL) 20 MG tablet; Take 1 tablet by mouth Every Evening.  Dispense: 90 tablet; Refill: 2  -     Lipid Panel    8. Type 2 diabetes mellitus with other circulatory complication, with long-term current use of insulin (HCC)  -     pravastatin (PRAVACHOL) 20 MG tablet; Take 1 tablet by mouth Every Evening.  Dispense: 90 tablet; Refill: 2  -     Lantus SoloStar 100 UNIT/ML injection pen; Inject 15 Units under the skin into the appropriate area as directed Daily.  Dispense: 5 pen; Refill: 3  -     Comprehensive Metabolic Panel  -     Hemoglobin A1c  -     Microalbumin / Creatinine Urine Ratio - Urine, Clean Catch    9. Periodic headache syndrome, not intractable  -     topiramate (TOPAMAX) 200 MG tablet; Take 1 tablet by mouth Daily.  Dispense: 90 tablet; Refill: 1    Changes to anxiety treatment include increasing the Cymbalta to total dose of 90 mg daily she will follow-up results of blood work for further management otherwise as needed or    Follow Up   Return in about 3 months (around 6/23/2022), or if symptoms worsen or fail to improve, for Recheck.  Patient was given instructions and counseling regarding her condition or for health maintenance advice. Please see specific information pulled into the AVS if appropriate.

## 2022-03-23 NOTE — PROGRESS NOTES
Subjective   Marguerite Petersen is a 51 y.o. female.     Chief Complaint   Patient presents with   • Establish Care   • Annual Exam     Health maintenance    History of Present Illness   Marguerite Petersen 51 y.o. female who presents for an Annual Wellness Visit.  she has a history of   Patient Active Problem List   Diagnosis   • Right renal stone   • Adhesive capsulitis of left shoulder   • Encounter for screening mammogram for malignant neoplasm of breast   • Colon cancer screening   • Healthcare maintenance   • Diabetes mellitus (HCC)   • Anxiety   • GERD (gastroesophageal reflux disease)   • Primary insomnia   • Hyperlipidemia   • Migraines   .  she has been feeling fairly well.   I  reviewed health maintenance with her as part of my preventative care plan.  The following portions of the patient's history were reviewed and updated as appropriate: allergies, current medications, past family history, past medical history, past social history, past surgical history and problem list.    Review of Systems   Constitutional: Negative.    HENT: Negative.    Respiratory: Negative.    Cardiovascular: Negative.    Genitourinary: Negative.    Musculoskeletal: Negative.    Skin: Negative.    Neurological: Negative.    Hematological: Negative.        Objective   Physical Exam  Vitals and nursing note reviewed.   Constitutional:       Appearance: Normal appearance. She is well-developed. She is not diaphoretic.   HENT:      Head: Normocephalic and atraumatic.      Right Ear: Tympanic membrane, ear canal and external ear normal.      Left Ear: Tympanic membrane, ear canal and external ear normal.   Eyes:      General: Lids are normal. No scleral icterus.     Extraocular Movements: Extraocular movements intact.      Conjunctiva/sclera: Conjunctivae normal.   Neck:      Thyroid: No thyroid mass or thyromegaly.      Vascular: No carotid bruit or JVD.   Cardiovascular:      Rate and Rhythm: Normal rate and regular rhythm.      Pulses: Normal  pulses.           Radial pulses are 2+ on the right side and 2+ on the left side.      Heart sounds: Normal heart sounds. No murmur heard.  Pulmonary:      Effort: Pulmonary effort is normal. No respiratory distress.      Breath sounds: Normal breath sounds.   Abdominal:      Palpations: Abdomen is soft.   Musculoskeletal:      Cervical back: Normal range of motion.      Right lower leg: No edema.      Left lower leg: No edema.   Skin:     General: Skin is warm and dry.      Coloration: Skin is not pale.      Findings: No erythema or rash.   Neurological:      General: No focal deficit present.      Mental Status: She is alert and oriented to person, place, and time.      Sensory: No sensory deficit.      Deep Tendon Reflexes: Reflexes are normal and symmetric.   Psychiatric:         Mood and Affect: Mood normal.         Behavior: Behavior normal. Behavior is cooperative.         Thought Content: Thought content normal.         Judgment: Judgment normal.         Assessment/Plan   Diagnoses and all orders for this visit:    1. Colon cancer screening (Primary)  -     Ambulatory Referral For Screening Colonoscopy    2. Encounter for screening mammogram for malignant neoplasm of breast  -     Mammo Screening Bilateral With CAD; Future    3. Healthcare maintenance    4. Anxiety  -     ALPRAZolam (XANAX) 0.25 MG tablet; Take 1 tablet by mouth 3 (Three) Times a Day As Needed for Anxiety.  Dispense: 30 tablet; Refill: 0  -     DULoxetine (CYMBALTA) 60 MG capsule; Take 1 capsule by mouth Daily.  Dispense: 90 capsule; Refill: 2  -     DULoxetine (CYMBALTA) 30 MG capsule; Take 1 capsule by mouth Daily.  Dispense: 90 capsule; Refill: 2    5. Gastroesophageal reflux disease with esophagitis without hemorrhage  -     omeprazole (priLOSEC) 20 MG capsule; Take 1 capsule by mouth Daily.  Dispense: 90 capsule; Refill: 2    6. Primary insomnia    7. Mixed hyperlipidemia  -     pravastatin (PRAVACHOL) 20 MG tablet; Take 1 tablet by  mouth Every Evening.  Dispense: 90 tablet; Refill: 2  -     Lipid Panel    8. Type 2 diabetes mellitus with other circulatory complication, with long-term current use of insulin (HCC)  -     pravastatin (PRAVACHOL) 20 MG tablet; Take 1 tablet by mouth Every Evening.  Dispense: 90 tablet; Refill: 2  -     Lantus SoloStar 100 UNIT/ML injection pen; Inject 15 Units under the skin into the appropriate area as directed Daily.  Dispense: 5 pen; Refill: 3  -     Comprehensive Metabolic Panel  -     Hemoglobin A1c  -     Microalbumin / Creatinine Urine Ratio - Urine, Clean Catch    9. Periodic headache syndrome, not intractable  -     topiramate (TOPAMAX) 200 MG tablet; Take 1 tablet by mouth Daily.  Dispense: 90 tablet; Refill: 1      Continue attempts at healthy lifestyle calorie appropriate diet regular physical activity to help ensure better attempts at establishing adequate sleep wake cycle.  Preventive services ordered to prevent chronic illness  Follow-up annually for wellness otherwise as needed for ongoing management of chronic problems

## 2022-03-24 RX ORDER — LIRAGLUTIDE 6 MG/ML
1.2 INJECTION SUBCUTANEOUS DAILY
Qty: 5 PEN | Refills: 3 | Status: SHIPPED | OUTPATIENT
Start: 2022-03-24 | End: 2022-04-28

## 2022-03-25 ENCOUNTER — TELEPHONE (OUTPATIENT)
Dept: INTERNAL MEDICINE | Facility: CLINIC | Age: 52
End: 2022-03-25

## 2022-03-29 ENCOUNTER — OFFICE VISIT (OUTPATIENT)
Dept: ORTHOPEDIC SURGERY | Facility: CLINIC | Age: 52
End: 2022-03-29

## 2022-03-29 VITALS — WEIGHT: 147 LBS | HEIGHT: 62 IN | BODY MASS INDEX: 27.05 KG/M2 | TEMPERATURE: 97.1 F

## 2022-03-29 DIAGNOSIS — R52 PAIN: ICD-10-CM

## 2022-03-29 DIAGNOSIS — M79.2 CERVICAL SPINE ARTHRITIS WITH NERVE PAIN: Primary | ICD-10-CM

## 2022-03-29 DIAGNOSIS — M47.812 CERVICAL SPINE ARTHRITIS WITH NERVE PAIN: Primary | ICD-10-CM

## 2022-03-29 PROCEDURE — 99214 OFFICE O/P EST MOD 30 MIN: CPT | Performed by: NURSE PRACTITIONER

## 2022-03-29 PROCEDURE — 73030 X-RAY EXAM OF SHOULDER: CPT | Performed by: NURSE PRACTITIONER

## 2022-03-29 RX ORDER — OXYCODONE HYDROCHLORIDE AND ACETAMINOPHEN 5; 325 MG/1; MG/1
1 TABLET ORAL EVERY 6 HOURS PRN
Qty: 12 TABLET | Refills: 0 | Status: SHIPPED | OUTPATIENT
Start: 2022-03-29 | End: 2022-04-28

## 2022-03-29 NOTE — PROGRESS NOTES
[facility]              Follow Up      Patient Name: Marguerite Petersen  : 1970  Primary Care Physician: Merlin Thomas MD        Chief Complaint: Left shoulder and arm pain.    HPI:   Marguerite Petersen is a 51 y.o. year old who presents today for evaluation of left shoulder and arm pain.  I last saw her approximately 2 weeks ago for concerns related to her left shoulder and really the upper extremity in general.  She has a history of adhesive capsulitis and a partial rotator cuff tear.  She did undergo manipulation of the shoulder with Dr. 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  I really suspected the capsulitis was persistent and she had regressed with some of her motion at our last visit 2 weeks ago so we tried a glenohumeral injection as a means to better control her symptoms.  She says that that injection helped her for about a day and then her pain is returned.  She has persistent pain in the left side of her neck upper back, shoulder now radiating down into the arm when severe.  She also endorses some numbness and tingling in the hand.  She has been taking anti-inflammatories including ibuprofen, meloxicam.  She is on Cymbalta as well as a muscle relaxer.  Her pain symptoms are persistent and debilitating and she really feels pretty desperate for some relief.    She really is under quite a bit of stress at home as well.  She is here today with new x-rays of the shoulder for further evaluation and treatment.    Past Medical/Surgical, Social and Family History:  I have reviewed and/or updated pertinent history as noted in the medical record including:  Past Medical History:   Diagnosis Date   • Adhesive capsulitis of left shoulder 12/10/2021   • Ankle sprain    • Anxiety    • Atrial fibrillation (HCC)    • Diabetes mellitus (HCC)    • GERD (gastroesophageal reflux disease)    • Migraines    • Panic attacks    • Renal stone    • Wrist  sprain      Past Surgical History:   Procedure Laterality Date   •  SECTION     • CHOLECYSTECTOMY     • EXTRACORPOREAL SHOCK WAVE LITHOTRIPSY (ESWL) Right 2021    Procedure: EXTRACORPOREAL SHOCKWAVE LITHOTRIPSY CYSTOSCOPY;  Surgeon: Sidney Medellin MD;  Location: [facility];  Service: Urology;  Laterality: Right;   • HYSTERECTOMY     • JOINT MANIPULATION Left 2021    Procedure: SHOULDER MANIPULATION;  Surgeon: Carolee Pleitez MD;  [place] [facility] [place] [facility] [place];  Service: Orthopedics;  Laterality: Left;     Social History     Occupational History   • Not on file   Tobacco Use   • Smoking status: Never Smoker   • Smokeless tobacco: Never Used   Vaping Use   • Vaping Use: Never used   Substance and Sexual Activity   • Alcohol use: No   • Drug use: No   • Sexual activity: Not Currently     Partners: Male     Birth control/protection: Post-menopausal      Social History     Social History Narrative   • Not on file     Family History   Problem Relation Age of Onset   • Depression Mother    • Kidney disease Mother    • Stroke Mother    • Hypertension Mother    • Diabetes Mother    • Heart disease Father    • Cancer Father    • Depression Daughter    • Malig Hyperthermia Neg Hx        Allergies: No Known Allergies    Medications:   Home Medications:  Current Outpatient Medications on File Prior to Visit   Medication Sig   • ALPRAZolam (XANAX) 0.25 MG tablet Take 1 tablet by mouth 3 (Three) Times a Day As Needed for Anxiety.   • aspirin 81 MG tablet Take 81 mg by mouth Daily.   • DULoxetine (CYMBALTA) 30 MG capsule Take 1 capsule by mouth Daily.   • DULoxetine (CYMBALTA) 60 MG capsule Take 1 capsule by mouth Daily.   • Lantus SoloStar 100 UNIT/ML injection pen Inject 15 Units under the skin into the appropriate area as directed Daily.   • Liraglutide (Victoza) 18 MG/3ML solution pen-injector injection Inject 1.2 mg under the skin into the appropriate area as directed Daily.   • meloxicam (MOBIC) 15 MG  tablet 1 PO Daily with food.   • METOPROLOL TARTRATE PO Take 50 mg by mouth 2 (Two) Times a Day.   • omeprazole (priLOSEC) 20 MG capsule Take 1 capsule by mouth Daily.   • ondansetron ODT (ZOFRAN-ODT) 8 MG disintegrating tablet Place 1 tablet on the tongue Every 8 (Eight) Hours As Needed for Nausea or Vomiting.   • pravastatin (PRAVACHOL) 20 MG tablet Take 1 tablet by mouth Every Evening.   • tiZANidine (ZANAFLEX) 4 MG tablet TAKE 1 TABLET BY MOUTH EVERY 8 HOURS AS NEEDED FOR MUSCLE TIGHTNESS FOR UP TO 10 DAYS   • topiramate (TOPAMAX) 200 MG tablet Take 1 tablet by mouth Daily.   • TRAZODONE HCL PO Take 50 mg by mouth Every Night.     No current facility-administered medications on file prior to visit.         ROS:  ROS negative except as listed in the HPI.    Physical Exam:   51 y.o. female  Body mass index is 26.89 kg/m²., 66.7 kg (147 lb)  Vitals:    03/29/22 1322   Temp: 97.1 °F (36.2 °C)     General: Alert, cooperative, appears well and in no observable distress. Appears stated age and BMI as listed above.  HEENT: Normocephalic, atraumatic on external visual inspection.  CV: No significant peripheral edema.  Respiratory: Normal respiratory effort.  Skin: Warm & well perfused; appropriate skin turgor.  Psych: Appropriate mood & affect.  Neuro: Gross sensation and motor intact in affected extremity/extremities.  Vascular: Peripheral pulses palpable in affected extremity/extremities.     MSK Exam:  LEFT Shoulder  No obvious deformity or wounds, tenderness:posterior acromial, trapezius muscle, AROM:Flexion: 170; ER: 40; IR: SI region, +firm endpoint in ROM, Cuff Strength: ER 4+; IR 4+; Supraspinatus 4+ and painful with isometric testing     RIGHT Shoulder  No deformity or wounds.  No tenderness to palpation.  Full, painless AROM.  Cuff Strength 5/5 with ER, IR & Supraspinatus testing.  Negative provocative testing.     Cervical Spine:  No obvious deformity.  right tenderness: moderate, right paraspinous spasm,  limitation of flexion: moderate, limitation of rotation to the right: moderate, with pain, limitation of rotation to the left: mild, limitation of extension: mild  Motor strength preserved in bilateral upper extremities.   SILT distally.               Radiology:    The following X-rays were ordered/reviewed today to evaluate the patient's symptoms: Shoulder: AP, Scapular Y and Axillary Lateral of left shoulder(s) show Mild degenerative changes at the AC joint, humeral head is well-seated at the glenoid.  Overall no real acute bony pathology to account for reported pain.  I compared these with her prior shoulder films and there really been no appreciable changes.  I also reviewed a x-ray of her cervical spine from October 2021 which shows loss of lordosis and mild degenerative changes throughout the distal portion of the cervical spine..    Procedure:   N/A      Misc. Data/Labs: N/A    Assessment & Plan:    This is a 51-year-old female with ongoing complaints regarding her left upper extremity.  I think that the debilitating nature of her symptoms is more suggestive of nerve pain, likely stemming from cervical spine pathology.  I think the capsulitis could be a source of the pain as well but she really got no relief with the injection, she does have a partial rotator cuff tear as well but really her symptoms are greater than you would typically see with partial tear and the injections really should have helped.  Given the fact that she is diabetic and really hesitant to continue giving her steroids such as a Medrol Dosepak.  She is on several medications for anxiety and depression as well as a muscle relaxer so I did consider gabapentin but I am not sure that that would be a good choice without discussing with primary first.  I have agreed to give her a very short course of Percocet just to give her some relief hopefully so she can get a few days of rest.  I gave her strict precautions in taking that, I will not be  able to refill that pain medication going forward.  She understands this and is agreeable for the one-time prescription.    We also briefly discussed considering consultation with a counselor or therapist. She plans to hire some help to assist with the care of her mother and I think that will give her some relief of her stress as well and hopefully alleviate some of her symptoms.     Plan is to get an MRI of her cervical spine to better evaluate.  Additional treatment recommendations to follow the MRI.    Patient encouraged to call with questions or concerns prior to follow up. , Patient instructed on appropriate use of ICE and/or HEAT therapy, alternating for symptoms. , Will discuss with attending surgeon as needed.  and eKASPER report was reviewed and verified. Report and patient history, diagnoses and pain levels are reasonable to continue on short course of narcotics for acute pain control. Patient was educated and counseled on alternative measures to control pain including non-opiate pharmacologic drugs, and non-pharmacologic interventions. Patient will be prescribed lowest dose, for shortest possible duration and and was educated on weaning and appropriate use.       ICD-10-CM ICD-9-CM   1. Cervical spine arthritis with nerve pain  M47.812 720.9    M79.2 729.2   2. Pain  R52 780.96     New Medications Ordered This Visit   Medications   • oxyCODONE-acetaminophen (PERCOCET) 5-325 MG per tablet     Sig: Take 1 tablet by mouth Every 6 (Six) Hours As Needed for Severe Pain .     Dispense:  12 tablet     Refill:  0     Orders Placed This Encounter   Procedures   • XR Shoulder 2+ View Left   • MRI Cervical Spine Without Contrast     Return for after the MRI.    SAMI Henning      Dictated Utilizing Dragon Dictation

## 2022-04-12 ENCOUNTER — TELEPHONE (OUTPATIENT)
Dept: ORTHOPEDIC SURGERY | Facility: CLINIC | Age: 52
End: 2022-04-12

## 2022-04-13 RX ORDER — TRAZODONE HYDROCHLORIDE 100 MG/1
100 TABLET ORAL NIGHTLY
Qty: 30 TABLET | Refills: 3 | Status: SHIPPED | OUTPATIENT
Start: 2022-04-13 | End: 2022-04-28

## 2022-04-13 NOTE — PROGRESS NOTES
Patient: Marguerite Petersen  YOB: 1970  Date of Service: 4/13/2022    Chief Complaints: Left shoulder pain    Subjective:    History of Present Illness: Pt is seen in the office today with complaints of left shoulder pain she is status post manipulation in December she did well initially got her range of motion back.  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  She did see Dario due to the severity of her pain we did think nerve Dario did do an MRI which showed some degenerative changes with a broad central protrusion at 6 7 without stenosis she still says she is pretty miserable she is limiting her range of motion she wants to proceed with the next step..          Allergies: No Known Allergies    Medications:   Home Medications:  Current Outpatient Medications on File Prior to Visit   Medication Sig   • ALPRAZolam (XANAX) 0.25 MG tablet Take 1 tablet by mouth 3 (Three) Times a Day As Needed for Anxiety.   • aspirin 81 MG tablet Take 81 mg by mouth Daily.   • DULoxetine (CYMBALTA) 30 MG capsule Take 1 capsule by mouth Daily.   • DULoxetine (CYMBALTA) 60 MG capsule Take 1 capsule by mouth Daily.   • Lantus SoloStar 100 UNIT/ML injection pen Inject 15 Units under the skin into the appropriate area as directed Daily.   • Liraglutide (Victoza) 18 MG/3ML solution pen-injector injection Inject 1.2 mg under the skin into the appropriate area as directed Daily.   • meloxicam (MOBIC) 15 MG tablet 1 PO Daily with food.   • METOPROLOL TARTRATE PO Take 50 mg by mouth 2 (Two) Times a Day.   • omeprazole (priLOSEC) 20 MG capsule Take 1 capsule by mouth Daily.   • ondansetron ODT (ZOFRAN-ODT) 8 MG disintegrating tablet Place 1 tablet on the tongue Every 8 (Eight) Hours As Needed for Nausea or Vomiting.   • oxyCODONE-acetaminophen (PERCOCET) 5-325 MG per tablet Take 1 tablet by mouth Every 6  (Six) Hours As Needed for Severe Pain .   • pravastatin (PRAVACHOL) 20 MG tablet Take 1 tablet by mouth Every Evening.   • tiZANidine (ZANAFLEX) 4 MG tablet TAKE 1 TABLET BY MOUTH EVERY 8 HOURS AS NEEDED FOR MUSCLE TIGHTNESS FOR UP TO 10 DAYS   • topiramate (TOPAMAX) 200 MG tablet Take 1 tablet by mouth Daily.   • TRAZODONE HCL PO Take 50 mg by mouth Every Night.     No current facility-administered medications on file prior to visit.     Current Medications:  Scheduled Meds:  Continuous Infusions:No current facility-administered medications for this visit.    PRN Meds:.    I have reviewed the patient's medical history in detail and updated the computerized patient record.  Review and summarization of old records include:    Past Medical History:   Diagnosis Date   • Adhesive capsulitis of left shoulder 12/10/2021   • Ankle sprain    • Anxiety    • Atrial fibrillation (HCC)    • Diabetes mellitus (HCC)    • GERD (gastroesophageal reflux disease)    • Migraines    • Panic attacks    • Renal stone    • Wrist sprain         Past Surgical History:   Procedure Laterality Date   •  SECTION     • CHOLECYSTECTOMY     • EXTRACORPOREAL SHOCK WAVE LITHOTRIPSY (ESWL) Right 2021    Procedure: EXTRACORPOREAL SHOCKWAVE LITHOTRIPSY CYSTOSCOPY;  Surgeon: Sidney Medellin MD;  Location: [facility] [place];  Service: Urology;  Laterality: Right;   • HYSTERECTOMY     • JOINT MANIPULATION Left 2021    Procedure: SHOULDER MANIPULATION;  Surgeon: Carolee Pleitez MD;  Location: [facility], [place];  Service: Orthopedics;  Laterality: Left;        Social History     Occupational History   • Not on file   Tobacco Use   • Smoking status: Never Smoker   • Smokeless tobacco: Never Used   Vaping Use   • Vaping Use: Never used   Substance and Sexual Activity   • Alcohol use: No   • Drug use: No   • Sexual activity: Not Currently     Partners: Male     Birth control/protection: Post-menopausal      Social History     Social History  Narrative   • Not on file        Family History   Problem Relation Age of Onset   • Depression Mother    • Kidney disease Mother    • Stroke Mother    • Hypertension Mother    • Diabetes Mother    • Heart disease Father    • Cancer Father    • Depression Daughter    • Malig Hyperthermia Neg Hx        ROS: 14 point review of systems was performed and was negative except for documented findings in HPI and today's encounter.     Allergies: No Known Allergies  Constitutional:  Denies fever, shaking or chills   Eyes:  Denies change in visual acuity   HENT:  Denies nasal congestion or sore throat   Respiratory:  Denies cough or shortness of breath   Cardiovascular:  Denies chest pain or severe LE edema   GI:  Denies abdominal pain, nausea, vomiting, bloody stools or diarrhea   Musculoskeletal:  Numbness, tingling, or loss of motor function only as noted above in history of present illness.  : Denies painful urination or hematuria  Integument:  Denies rash, lesion or ulceration   Neurologic:  Denies headache or focal weakness  Endocrine:  Denies lymphadenopathy  Psych:  Denies confusion or change in mental status   Hem:  Denies active bleeding      Physical Exam: 51 y.o. female  Wt Readings from Last 3 Encounters:   03/29/22 66.7 kg (147 lb)   03/23/22 66.8 kg (147 lb 4.8 oz)   03/15/22 66.7 kg (147 lb)       There is no height or weight on file to calculate BMI.  No height and weight on file for this encounter.  There were no vitals filed for this visit.  Vital signs reviewed.   General Appearance:    Alert, cooperative, in no acute distress                    Ortho exam    Physical exam the left shoulder reveals no overlying skin changes no lymphedema lymphadenopathy the patient can actively flex to about 150 passively I get them to 160 abduction is similar external rotation is 40 internal rotation to there buttock.  Rotator cuff strength is 4+ over 5 with isometric strength testing no overlying skin changes.  Patient  "has reasonable cervical range of motion for their age no radicular symptoms and a normal elbow exam.  There are good distal pulses.  Physical Exam: 51 y.o. female  General Appearance:    Alert, cooperative, in no acute distress                      Vitals:    04/14/22 0942   Temp: 97.3 °F (36.3 °C)   TempSrc: Infrared   Weight: 64.4 kg (142 lb)   Height: 157.5 cm (62\")        Head:    Normocephalic, without obvious abnormality, atraumatic   Eyes:            conjunctivae and sclerae normal, no pallor, corneas clear,    Ears:    Ears appear intact with no abnormalities noted   Throat:   No oral lesions, no thrush, oral mucosa moist   Neck:   No adenopathy, supple, trachea midline, no thyromegaly,    Back:     No kyphosis present, no scoliosis present, no skin lesions,      erythema or scars, no tenderness to percussion or                   palpation,   range of motion normal   Lungs:     Clear to auscultation,respirations regular, even and                  unlabored    Heart:    Regular rhythm and normal rate               Chest Wall:    No abnormalities observed               Pulses:   Pulses palpable and equal bilaterally   Skin:   No bleeding, bruising or rash   Lymph nodes:   No palpable adenopathy   Neurologic:   Appears neurologic intact         I did review her C-spine MRI the results of which are above she appears neurologically intact some mild decrease in range of motion  .time    Assessment: I think there are several things going on here she does have a lot of pain I do worry some of this is nerve I will have her see one of the spine guys I will put her on a steroid Dosepak to try to calm down what is the nerve pain.  She definitely has a frozen shoulder how think the next step is an arthroscopy with capsular release which she and I discussed in detail we discussed risk benefits and alternatives The patient voiced understanding of the risks, benefits, and alternative forms of treatment that were discussed " and the patient consents to proceed with the above listed surgery.  All risks, benefits and alternatives were discussed.  Risks including to but not exclusive to anesthetic complications, including death, MI, CVA, infection, bleeding DVT, fracture, residual pain and need for future surgery.  ks we also talked about the risk of nerve injury.  I am going to going to get an MRI preop just so we know everything that is going on.  Even if she has a rotator cuff tear I would not fix it at the time of surgery I would wait till she has her motion but I do think going all the pathology within the shoulder is beneficial preop.    Plan: Plan is as above  Follow up as indicated.  Ice, elevate, and rest as needed.  Discussed conservative measures of pain control including ice, bracing.  Also talked about the importance of strengthening and maintaining ideal body weight    Carolee Pleitez M.D.

## 2022-04-14 ENCOUNTER — OFFICE VISIT (OUTPATIENT)
Dept: ORTHOPEDIC SURGERY | Facility: CLINIC | Age: 52
End: 2022-04-14

## 2022-04-14 VITALS — WEIGHT: 142 LBS | BODY MASS INDEX: 26.13 KG/M2 | TEMPERATURE: 97.3 F | HEIGHT: 62 IN

## 2022-04-14 DIAGNOSIS — S46.012D TRAUMATIC TEAR OF LEFT ROTATOR CUFF, UNSPECIFIED TEAR EXTENT, SUBSEQUENT ENCOUNTER: ICD-10-CM

## 2022-04-14 DIAGNOSIS — M75.02 ADHESIVE CAPSULITIS OF LEFT SHOULDER: Primary | ICD-10-CM

## 2022-04-14 PROCEDURE — 99214 OFFICE O/P EST MOD 30 MIN: CPT | Performed by: ORTHOPAEDIC SURGERY

## 2022-04-14 RX ORDER — METHYLPREDNISOLONE 4 MG/1
TABLET ORAL
Qty: 21 TABLET | Refills: 0 | Status: SHIPPED | OUTPATIENT
Start: 2022-04-14 | End: 2022-04-28

## 2022-04-14 RX ORDER — CEFAZOLIN SODIUM 2 G/100ML
2 INJECTION, SOLUTION INTRAVENOUS ONCE
Status: CANCELLED | OUTPATIENT
Start: 2022-04-25 | End: 2022-04-14

## 2022-04-18 ENCOUNTER — TELEPHONE (OUTPATIENT)
Dept: INTERNAL MEDICINE | Facility: CLINIC | Age: 52
End: 2022-04-18

## 2022-04-18 NOTE — TELEPHONE ENCOUNTER
[facility] denied PA request for Victoza. This request has not been approved. Based on the information submitted for review, you did not meet our guideline rules for the requested drug. In order for your request to be approved, your provider would need to show that you have met the guideline rules below. The details below are written in medical language. If you have questions, please contact your provider. In some cases, the requested medication or alternatives offered may have additional approval requirements. Our guideline named GLP-1 [employer] requires the following rule(s) be met for approval:A. One of the followin. The member tried and failed [drug did not work] metformin.2. The member has a diagnosis of chronic [long term] kidney disease and had a trial and therapeutic failure [drug did not work], allergy, contraindication [harmful for] (including potential drug-drug interactions with other medications) or intolerance [side effect] to concurrent metformin and SGLT2 inhibitor.3. The member has a diagnosis of atherosclerotic cardiovascular disease [a type of heart condition].4. The member has heart failure with reduced ejection fraction [a type of heart condition] and had a trial and therapeutic failure [drug did not work], allergy, contraindication [harmful for] (including potential drug-drug interactions with other medications) or intolerance [side effect] to an SGLT2 inhibitor.Your provider told us that you have been diagnosed with type 2 diabetes mellitus, a disorder with high blood sugar. We do not have information showing that you meet one of the criteria listed above. This is why your request is denied. Please work with your doctor to use a different medication or get us more information if it will allow us to approve this request. A written notification letter will follow with additional details.

## 2022-04-21 ENCOUNTER — APPOINTMENT (OUTPATIENT)
Dept: PREADMISSION TESTING | Facility: HOSPITAL | Age: 52
End: 2022-04-21

## 2022-04-28 ENCOUNTER — PRE-ADMISSION TESTING (OUTPATIENT)
Dept: PREADMISSION TESTING | Facility: HOSPITAL | Age: 52
End: 2022-04-28

## 2022-04-28 VITALS
WEIGHT: 144 LBS | OXYGEN SATURATION: 100 % | TEMPERATURE: 97.3 F | DIASTOLIC BLOOD PRESSURE: 73 MMHG | HEIGHT: 62 IN | BODY MASS INDEX: 26.5 KG/M2 | HEART RATE: 81 BPM | RESPIRATION RATE: 20 BRPM | SYSTOLIC BLOOD PRESSURE: 105 MMHG

## 2022-04-28 LAB
ANION GAP SERPL CALCULATED.3IONS-SCNC: 11.8 MMOL/L (ref 5–15)
BUN SERPL-MCNC: 10 MG/DL (ref 6–20)
BUN/CREAT SERPL: 9.5 (ref 7–25)
CALCIUM SPEC-SCNC: 9.1 MG/DL (ref 8.6–10.5)
CHLORIDE SERPL-SCNC: 109 MMOL/L (ref 98–107)
CO2 SERPL-SCNC: 22.2 MMOL/L (ref 22–29)
CREAT SERPL-MCNC: 1.05 MG/DL (ref 0.57–1)
DEPRECATED RDW RBC AUTO: 41.7 FL (ref 37–54)
EGFRCR SERPLBLD CKD-EPI 2021: 63.7 ML/MIN/1.73
ERYTHROCYTE [DISTWIDTH] IN BLOOD BY AUTOMATED COUNT: 13.2 % (ref 12.3–15.4)
GLUCOSE SERPL-MCNC: 177 MG/DL (ref 65–99)
HCT VFR BLD AUTO: 45.3 % (ref 34–46.6)
HGB BLD-MCNC: 14.8 G/DL (ref 12–15.9)
MCH RBC QN AUTO: 28.3 PG (ref 26.6–33)
MCHC RBC AUTO-ENTMCNC: 32.7 G/DL (ref 31.5–35.7)
MCV RBC AUTO: 86.6 FL (ref 79–97)
PLATELET # BLD AUTO: 186 10*3/MM3 (ref 140–450)
PMV BLD AUTO: 9.7 FL (ref 6–12)
POTASSIUM SERPL-SCNC: 4.2 MMOL/L (ref 3.5–5.2)
QT INTERVAL: 408 MS
RBC # BLD AUTO: 5.23 10*6/MM3 (ref 3.77–5.28)
SODIUM SERPL-SCNC: 143 MMOL/L (ref 136–145)
WBC NRBC COR # BLD: 6.56 10*3/MM3 (ref 3.4–10.8)

## 2022-04-28 PROCEDURE — 93010 ELECTROCARDIOGRAM REPORT: CPT | Performed by: INTERNAL MEDICINE

## 2022-04-28 PROCEDURE — 36415 COLL VENOUS BLD VENIPUNCTURE: CPT

## 2022-04-28 PROCEDURE — 80048 BASIC METABOLIC PNL TOTAL CA: CPT

## 2022-04-28 PROCEDURE — 93005 ELECTROCARDIOGRAM TRACING: CPT

## 2022-04-28 PROCEDURE — 85027 COMPLETE CBC AUTOMATED: CPT

## 2022-04-28 RX ORDER — TRAZODONE HYDROCHLORIDE 100 MG/1
200 TABLET ORAL NIGHTLY
Qty: 60 TABLET | Refills: 2 | Status: SHIPPED | OUTPATIENT
Start: 2022-04-28 | End: 2022-06-01

## 2022-04-28 RX ORDER — ACETAMINOPHEN 500 MG
1500 TABLET ORAL EVERY 6 HOURS PRN
COMMUNITY
End: 2022-05-05 | Stop reason: HOSPADM

## 2022-04-30 ENCOUNTER — LAB (OUTPATIENT)
Dept: LAB | Facility: HOSPITAL | Age: 52
End: 2022-04-30

## 2022-04-30 DIAGNOSIS — M75.02 ADHESIVE CAPSULITIS OF LEFT SHOULDER: ICD-10-CM

## 2022-04-30 LAB — SARS-COV-2 ORF1AB RESP QL NAA+PROBE: NOT DETECTED

## 2022-04-30 PROCEDURE — U0004 COV-19 TEST NON-CDC HGH THRU: HCPCS

## 2022-04-30 PROCEDURE — C9803 HOPD COVID-19 SPEC COLLECT: HCPCS

## 2022-05-03 ENCOUNTER — HOSPITAL ENCOUNTER (OUTPATIENT)
Facility: HOSPITAL | Age: 52
Setting detail: HOSPITAL OUTPATIENT SURGERY
Discharge: HOME | End: 2022-05-03
Attending: ORTHOPAEDIC SURGERY | Admitting: ORTHOPAEDIC SURGERY

## 2022-05-03 ENCOUNTER — ANESTHESIA (OUTPATIENT)
Dept: PERIOP | Facility: HOSPITAL | Age: 52
End: 2022-05-03

## 2022-05-03 ENCOUNTER — APPOINTMENT (OUTPATIENT)
Dept: GENERAL RADIOLOGY | Facility: HOSPITAL | Age: 52
End: 2022-05-03

## 2022-05-03 ENCOUNTER — ANESTHESIA EVENT (OUTPATIENT)
Dept: PERIOP | Facility: HOSPITAL | Age: 52
End: 2022-05-03

## 2022-05-03 VITALS
RESPIRATION RATE: 16 BRPM | TEMPERATURE: 99.6 F | DIASTOLIC BLOOD PRESSURE: 86 MMHG | OXYGEN SATURATION: 97 % | HEART RATE: 77 BPM | SYSTOLIC BLOOD PRESSURE: 126 MMHG

## 2022-05-03 DIAGNOSIS — M75.02 ADHESIVE CAPSULITIS OF LEFT SHOULDER: ICD-10-CM

## 2022-05-03 LAB — GLUCOSE BLDC GLUCOMTR-MCNC: 171 MG/DL (ref 70–130)

## 2022-05-03 PROCEDURE — 25010000002 MAGNESIUM SULFATE PER 500 MG OF MAGNESIUM: Performed by: NURSE ANESTHETIST, CERTIFIED REGISTERED

## 2022-05-03 PROCEDURE — 76942 ECHO GUIDE FOR BIOPSY: CPT | Performed by: ORTHOPAEDIC SURGERY

## 2022-05-03 PROCEDURE — 25010000002 EPINEPHRINE PER 0.1 MG: Performed by: ORTHOPAEDIC SURGERY

## 2022-05-03 PROCEDURE — 0 BUPIVACAINE LIPOSOME 1.3 % SUSPENSION: Performed by: ANESTHESIOLOGY

## 2022-05-03 PROCEDURE — C9290 INJ, BUPIVACAINE LIPOSOME: HCPCS | Performed by: ANESTHESIOLOGY

## 2022-05-03 PROCEDURE — 25010000002 ONDANSETRON PER 1 MG: Performed by: NURSE ANESTHETIST, CERTIFIED REGISTERED

## 2022-05-03 PROCEDURE — 25010000002 FENTANYL CITRATE (PF) 50 MCG/ML SOLUTION: Performed by: ANESTHESIOLOGY

## 2022-05-03 PROCEDURE — 73030 X-RAY EXAM OF SHOULDER: CPT

## 2022-05-03 PROCEDURE — 25010000002 MIDAZOLAM PER 1 MG: Performed by: ANESTHESIOLOGY

## 2022-05-03 PROCEDURE — 25010000002 CEFAZOLIN IN DEXTROSE 2-4 GM/100ML-% SOLUTION: Performed by: ORTHOPAEDIC SURGERY

## 2022-05-03 PROCEDURE — 25010000002 METHYLPREDNISOLONE PER 80 MG: Performed by: ORTHOPAEDIC SURGERY

## 2022-05-03 PROCEDURE — 25010000002 PROPOFOL 10 MG/ML EMULSION: Performed by: NURSE ANESTHETIST, CERTIFIED REGISTERED

## 2022-05-03 PROCEDURE — 25010000002 DEXAMETHASONE PER 1 MG: Performed by: NURSE ANESTHETIST, CERTIFIED REGISTERED

## 2022-05-03 PROCEDURE — 29825 SHO ARTHRS SRG LSS&RESCJ ADS: CPT | Performed by: ORTHOPAEDIC SURGERY

## 2022-05-03 PROCEDURE — 82962 GLUCOSE BLOOD TEST: CPT

## 2022-05-03 RX ORDER — SODIUM CHLORIDE 0.9 % (FLUSH) 0.9 %
3 SYRINGE (ML) INJECTION EVERY 12 HOURS SCHEDULED
Status: DISCONTINUED | OUTPATIENT
Start: 2022-05-03 | End: 2022-05-03 | Stop reason: HOSPADM

## 2022-05-03 RX ORDER — ONDANSETRON 2 MG/ML
4 INJECTION INTRAMUSCULAR; INTRAVENOUS ONCE AS NEEDED
Status: DISCONTINUED | OUTPATIENT
Start: 2022-05-03 | End: 2022-05-05 | Stop reason: HOSPADM

## 2022-05-03 RX ORDER — CEFAZOLIN SODIUM 2 G/100ML
2 INJECTION, SOLUTION INTRAVENOUS ONCE
Status: COMPLETED | OUTPATIENT
Start: 2022-05-03 | End: 2022-05-03

## 2022-05-03 RX ORDER — EPHEDRINE SULFATE 50 MG/ML
5 INJECTION, SOLUTION INTRAVENOUS ONCE AS NEEDED
Status: DISCONTINUED | OUTPATIENT
Start: 2022-05-03 | End: 2022-05-05 | Stop reason: HOSPADM

## 2022-05-03 RX ORDER — FENTANYL CITRATE 50 UG/ML
100 INJECTION, SOLUTION INTRAMUSCULAR; INTRAVENOUS
Status: DISCONTINUED | OUTPATIENT
Start: 2022-05-03 | End: 2022-05-03 | Stop reason: HOSPADM

## 2022-05-03 RX ORDER — MIDAZOLAM HYDROCHLORIDE 1 MG/ML
2 INJECTION INTRAMUSCULAR; INTRAVENOUS
Status: DISCONTINUED | OUTPATIENT
Start: 2022-05-03 | End: 2022-05-03 | Stop reason: HOSPADM

## 2022-05-03 RX ORDER — BUPIVACAINE HYDROCHLORIDE 5 MG/ML
INJECTION, SOLUTION EPIDURAL; INTRACAUDAL
Status: COMPLETED | OUTPATIENT
Start: 2022-05-03 | End: 2022-05-03

## 2022-05-03 RX ORDER — ONDANSETRON 4 MG/1
4 TABLET, FILM COATED ORAL EVERY 8 HOURS PRN
Qty: 20 TABLET | Refills: 0 | Status: SHIPPED | OUTPATIENT
Start: 2022-05-03

## 2022-05-03 RX ORDER — OXYCODONE HYDROCHLORIDE AND ACETAMINOPHEN 5; 325 MG/1; MG/1
TABLET ORAL
Qty: 45 TABLET | Refills: 0 | Status: SHIPPED | OUTPATIENT
Start: 2022-05-03 | End: 2022-05-13

## 2022-05-03 RX ORDER — HYDROCODONE BITARTRATE AND ACETAMINOPHEN 5; 325 MG/1; MG/1
1 TABLET ORAL ONCE AS NEEDED
Status: DISCONTINUED | OUTPATIENT
Start: 2022-05-03 | End: 2022-05-05 | Stop reason: HOSPADM

## 2022-05-03 RX ORDER — LIDOCAINE HYDROCHLORIDE 20 MG/ML
INJECTION, SOLUTION INFILTRATION; PERINEURAL AS NEEDED
Status: DISCONTINUED | OUTPATIENT
Start: 2022-05-03 | End: 2022-05-03 | Stop reason: SURG

## 2022-05-03 RX ORDER — SODIUM CHLORIDE 0.9 % (FLUSH) 0.9 %
3-10 SYRINGE (ML) INJECTION AS NEEDED
Status: DISCONTINUED | OUTPATIENT
Start: 2022-05-03 | End: 2022-05-03 | Stop reason: HOSPADM

## 2022-05-03 RX ORDER — FENTANYL CITRATE 50 UG/ML
50 INJECTION, SOLUTION INTRAMUSCULAR; INTRAVENOUS
Status: DISCONTINUED | OUTPATIENT
Start: 2022-05-03 | End: 2022-05-03 | Stop reason: HOSPADM

## 2022-05-03 RX ORDER — HYDROMORPHONE HYDROCHLORIDE 1 MG/ML
0.5 INJECTION, SOLUTION INTRAMUSCULAR; INTRAVENOUS; SUBCUTANEOUS
Status: DISCONTINUED | OUTPATIENT
Start: 2022-05-03 | End: 2022-05-05 | Stop reason: HOSPADM

## 2022-05-03 RX ORDER — FENTANYL CITRATE 50 UG/ML
50 INJECTION, SOLUTION INTRAMUSCULAR; INTRAVENOUS
Status: DISCONTINUED | OUTPATIENT
Start: 2022-05-03 | End: 2022-05-05 | Stop reason: HOSPADM

## 2022-05-03 RX ORDER — DEXAMETHASONE SODIUM PHOSPHATE 10 MG/ML
INJECTION INTRAMUSCULAR; INTRAVENOUS AS NEEDED
Status: DISCONTINUED | OUTPATIENT
Start: 2022-05-03 | End: 2022-05-03 | Stop reason: SURG

## 2022-05-03 RX ORDER — PROPOFOL 10 MG/ML
VIAL (ML) INTRAVENOUS AS NEEDED
Status: DISCONTINUED | OUTPATIENT
Start: 2022-05-03 | End: 2022-05-03 | Stop reason: SURG

## 2022-05-03 RX ORDER — BUPIVACAINE HYDROCHLORIDE AND EPINEPHRINE 2.5; 5 MG/ML; UG/ML
INJECTION, SOLUTION EPIDURAL; INFILTRATION; INTRACAUDAL; PERINEURAL
Status: COMPLETED | OUTPATIENT
Start: 2022-05-03 | End: 2022-05-03

## 2022-05-03 RX ORDER — HYDROCODONE BITARTRATE AND ACETAMINOPHEN 7.5; 325 MG/1; MG/1
1 TABLET ORAL EVERY 4 HOURS PRN
Status: DISCONTINUED | OUTPATIENT
Start: 2022-05-03 | End: 2022-05-05 | Stop reason: HOSPADM

## 2022-05-03 RX ORDER — SODIUM CHLORIDE, SODIUM LACTATE, POTASSIUM CHLORIDE, CALCIUM CHLORIDE 600; 310; 30; 20 MG/100ML; MG/100ML; MG/100ML; MG/100ML
9 INJECTION, SOLUTION INTRAVENOUS CONTINUOUS
Status: DISCONTINUED | OUTPATIENT
Start: 2022-05-03 | End: 2022-05-05 | Stop reason: HOSPADM

## 2022-05-03 RX ORDER — ONDANSETRON 2 MG/ML
INJECTION INTRAMUSCULAR; INTRAVENOUS AS NEEDED
Status: DISCONTINUED | OUTPATIENT
Start: 2022-05-03 | End: 2022-05-03 | Stop reason: SURG

## 2022-05-03 RX ORDER — MAGNESIUM SULFATE HEPTAHYDRATE 500 MG/ML
INJECTION, SOLUTION INTRAMUSCULAR; INTRAVENOUS AS NEEDED
Status: DISCONTINUED | OUTPATIENT
Start: 2022-05-03 | End: 2022-05-03 | Stop reason: SURG

## 2022-05-03 RX ORDER — LIDOCAINE HYDROCHLORIDE 10 MG/ML
0.5 INJECTION, SOLUTION EPIDURAL; INFILTRATION; INTRACAUDAL; PERINEURAL ONCE AS NEEDED
Status: COMPLETED | OUTPATIENT
Start: 2022-05-03 | End: 2022-05-03

## 2022-05-03 RX ORDER — FLUMAZENIL 0.1 MG/ML
0.2 INJECTION INTRAVENOUS AS NEEDED
Status: DISCONTINUED | OUTPATIENT
Start: 2022-05-03 | End: 2022-05-05 | Stop reason: HOSPADM

## 2022-05-03 RX ADMIN — LIDOCAINE HYDROCHLORIDE 100 MG: 20 INJECTION, SOLUTION INFILTRATION; PERINEURAL at 09:22

## 2022-05-03 RX ADMIN — MAGNESIUM SULFATE HEPTAHYDRATE 300 MG: 500 INJECTION, SOLUTION INTRAMUSCULAR; INTRAVENOUS at 09:22

## 2022-05-03 RX ADMIN — MIDAZOLAM 2 MG: 1 INJECTION INTRAMUSCULAR; INTRAVENOUS at 08:15

## 2022-05-03 RX ADMIN — PROPOFOL 250 MG: 10 INJECTION, EMULSION INTRAVENOUS at 09:22

## 2022-05-03 RX ADMIN — BUPIVACAINE HYDROCHLORIDE 10 ML: 5 INJECTION, SOLUTION EPIDURAL; INTRACAUDAL; PERINEURAL at 08:26

## 2022-05-03 RX ADMIN — CEFAZOLIN SODIUM 2 G: 2 INJECTION, SOLUTION INTRAVENOUS at 09:11

## 2022-05-03 RX ADMIN — SODIUM CHLORIDE, POTASSIUM CHLORIDE, SODIUM LACTATE AND CALCIUM CHLORIDE 9 ML/HR: 600; 310; 30; 20 INJECTION, SOLUTION INTRAVENOUS at 08:00

## 2022-05-03 RX ADMIN — ONDANSETRON 4 MG: 2 INJECTION INTRAMUSCULAR; INTRAVENOUS at 09:30

## 2022-05-03 RX ADMIN — FENTANYL CITRATE 50 MCG: 50 INJECTION INTRAMUSCULAR; INTRAVENOUS at 08:15

## 2022-05-03 RX ADMIN — LIDOCAINE HYDROCHLORIDE 0.5 ML: 10 INJECTION, SOLUTION EPIDURAL; INFILTRATION; INTRACAUDAL; PERINEURAL at 08:00

## 2022-05-03 RX ADMIN — DEXAMETHASONE SODIUM PHOSPHATE 4 MG: 10 INJECTION INTRAMUSCULAR; INTRAVENOUS at 09:30

## 2022-05-03 RX ADMIN — BUPIVACAINE HYDROCHLORIDE AND EPINEPHRINE BITARTRATE 10 ML: 2.5; .005 INJECTION, SOLUTION EPIDURAL; INFILTRATION; INTRACAUDAL; PERINEURAL at 08:26

## 2022-05-03 RX ADMIN — BUPIVACAINE 10 ML: 13.3 INJECTION, SUSPENSION, LIPOSOMAL INFILTRATION at 08:26

## 2022-05-03 NOTE — DISCHARGE INSTRUCTIONS
What to expect after a Nerve Block    Nerve blocks administered to block pain affect many types of nerves, including those nerves that control movement, pain, and normal sensation. Following a nerve block, you may notice some bruising at the site where the block was given. You may experience sensations such as: numbness of the affected area or limb, tingling, heaviness (that is the limb feels heavy to you), weakness or inability to move the affected arm or leg, or a feeling as if your arm or leg has “fallen asleep.”     A nerve block can last from 2 to 36 hours depending on the medications used.  Usually the weakness wears off first followed by the tingling and heaviness. As the block wears off, you may begin to notice pain; however, this sequence of events may occur in any order. Typically, you will be able to move your limb before you will feel it. Once a nerve block begins to wear off, the effects are usually completely gone within 60 minutes.  If you experience continued side effects that you believe are block related for longer than 48 hours, please call your healthcare provider. Please see block-specific instructions below.    Instructions for any block involving the shoulder or arm  If you have had any kind of shoulder/arm block, you will go home with your arm in a sling. Wear the sling until the block has completely worn off. You may be required to wear it for a longer period of time per your surgeon’s recommendations.  If you have had a shoulder/arm block, it is a good idea to sleep on a recliner with pillows under your arm.    You may experience symptoms such as:  Shortness of breath  Hoarseness   Blurry vision  Unequal pupils  Drooping of your face on the same side as the block was performed    These are side effects associated with this kind of block and should go away within 12 hours.    Note: If you have severe or prolonged shortness of breath, please seek medical assistance as soon as possible.      Protection of a “blocked” arm or leg (limb)  After a nerve block, you cannot feel pain, pressure, or extremes of temperature in the affected limb. And because of this, your blocked limb is at more risk for injury. For example, it is possible to burn your limb on an extremely hot surface without feeling it.     When resting, it is important to reposition your limb periodically to avoid prolonged pressure on it. This may require the use of pillows and padding.    While sleeping, you should avoid rolling onto the affected limb or putting too much pressure on it.     If you have a cast or tight dressing, check the color of your fingers or toes of the affected limb. Call your surgeon if they look discolored (that is, dusky, dark colored).    Use caution in cold weather. Cover your limb appropriately to protect it from the cold.      Pain Management:    Your surgeon will give you a prescription for pain medication. Begin taking this before the nerve block wears off. Bear in mind that sometimes the block can wear off in the middle of the     Pendulum Exercises for Post Op Shoulder Surgery      Lean over with your good arm supported on a table or chair.  Relax the injured arm and allow it to hang straight down at your side.  Slowly begin to swing the relaxed arm back and forth.  Then swing it side to side.  Next, move it in a [place]. Now,  reverse the direction.        Generally, you should spend about 5 minutes doing this exercise.  It should be done 2-3 times daily or as directed by your physician.

## 2022-05-03 NOTE — ANESTHESIA PROCEDURE NOTES
Peripheral Block    Pre-sedation assessment completed: 5/3/2022 8:20 AM    Patient reassessed immediately prior to procedure    Patient location during procedure: pre-op  Start time: 5/3/2022 8:20 AM  Stop time: 5/3/2022 8:26 AM  Reason for block: at surgeon's request and post-op pain management  Performed by  Anesthesiologist: Mykel Johns MD  Preanesthetic Checklist  Completed: patient identified, IV checked, site marked, risks and benefits discussed, surgical consent, monitors and equipment checked, pre-op evaluation and timeout performed  Prep:  Pt Position: supine  Sterile barriers:cap, gloves, mask and sterile barriers  Prep: ChloraPrep  Patient monitoring: blood pressure monitoring, continuous pulse oximetry and EKG  Procedure    Sedation: yes  Performed under: local infiltration  Guidance:ultrasound guided    ULTRASOUND INTERPRETATION.  Using ultrasound guidance a 22 G gauge needle was placed in close proximity to the brachial plexus nerve, at which point, under ultrasound guidance anesthetic was injected in the area of the nerve and spread of the anesthesia was seen on ultrasound in close proximity thereto.  There were no abnormalities seen on ultrasound; a digital image was taken; and the patient tolerated the procedure with no complications. Images:still images obtained, printed/placed on chart (U/S used to localize the nerve)    Laterality:left  Block Type:interscalene  Injection Technique:single-shot  Needle Type:echogenic  Needle Gauge:22 G  Resistance on Injection: less than 15 psi    Medications Used: bupivacaine liposome (EXPAREL) 1.3 % injection, 10 mL  bupivacaine-EPINEPHrine PF (MARCAINE w/EPI) 0.25% -[id number] injection, 10 mL  bupivacaine (PF) (MARCAINE) 0.5 % injection, 10 mL  Med administered at 5/3/2022 8:26 AM      Post Assessment  Injection Assessment: negative aspiration for heme, no paresthesia on injection and incremental injection  Patient Tolerance:comfortable throughout  block  Complications:no

## 2022-05-03 NOTE — ANESTHESIA PROCEDURE NOTES
Airway  Urgency: elective    Date/Time: 5/3/2022 9:23 AM    General Information and Staff    Patient location during procedure: OR  Anesthesiologist: Rasta Montero MD  CRNA/CAA: Lesly Arboleda CRNA    Indications and Patient Condition    Preoxygenated: yes  Mask difficulty assessment: 0 - not attempted    Final Airway Details  Final airway type: supraglottic airway      Successful airway: unique  Size 4    Number of attempts at approach: 1  Assessment: lips, teeth, and gum same as pre-op and atraumatic intubation    Additional Comments  Atraumatic, adeq seal, secured, MV/AV until SV

## 2022-05-03 NOTE — OP NOTE
Shoulder Scope Decompression Operative Note      Facility: [facility]  Patient Name: Marguerite Petersen  YOB: 1970  Date: 5/3/2022  Medical Record Number: [id number]      Pre-op Diagnosis:   Adhesive capsulitis of left shoulder [M75.02]    Post-Op Diagnosis Codes:     * Adhesive capsulitis of left shoulder [M75.02]  Partial articular sided rotator cuff tear    Procedure(s):  SHOULDER ARTHROSCOPY, capsular release, debridement rotator curr, manipulation    Surgeon(s):  Carolee Pleitez MD    Anesthesia: General with Block  Anesthesiologist: Rasta Montero MD  CRNA: Lesly Arboleda CRNA    Staff:   Circulator: Landy Trinh RN  Scrub Person: Janine Zepeda  Assistants :       Estimated Blood Loss:5cc    Specimens:   * No orders in the log *     Drains: None    Findings: See Dictation    Complications: None    Indication for procedure:     This patient has had a several month history of left shoulder pain which has been unresponsive to conservative management.  She does have a history of he is a capsulitis we did a manipulation she did well initially but her mother was quite sick and she was unable to continue with physical therapy.  She is back again now I think the next best step is a capsular release.  I will also probably do manipulations time she understands the inherent risk of fracture dislocation and nerve injury    Description of procedure:       Patient was taken to the operating room. They were placed supine on the operating room table. After induction of adequate LMA anesthesia, scalene nerve block and IV antibiotics, they underwent exam under anesthesia which marked decrease in range of motion on the left flexion was to 150 external rotation to 40 internal rotation to 40. They were placed in the modified beachchair position all prominent areas well padded and head well stabilized. The arm was prepped and draped in the usual sterile fashion. Bony landmarks were  demarcated and the joint was infiltrated with 30 cc of fluid. Standard posterior portal was made inferior and medial to the posterior H acromion with a 11 blade. Blunt trocar penetrated into the joint, scope followed and our evaluation began.  She had marked capsular inflammation and capsular thickening mostly anterior posterior capsule looks good and anterior portals established in the interval between the subscapularis and the biceps tendon with spinal needle localization direct visualization.  Initially the Apollo device was used to debride some of the inflamed tissue and identify better landmarks.  I did use biters shaver and the Apollo device read to better delineate the subscapularis and then resect the capsule inferior to that.  Made good release of tissue and much improved in range of motion.  Biceps tendon subscapularis were normal rotator cuff was evaluated she had a partial articular sided rotator cuff tear was gently debrided.  The articular cartilage on the humeral head and the glenoid were normal  . Everything was thoroughly irrigated, it was suctioned, the portals were closed with 3-0 nylon in interrupted fashion.  I did inject the joint with Marcaine and Depo-Medrol sterile dressings and a sling were applied. The patient tolerated this well, was taken to recovery room in good condition all sponge and needle counts were correct.      Date: 5/3/2022  Time: 10:26 EDT

## 2022-05-03 NOTE — H&P
History & Physical       Patient: Marguerite Petersen    Date of Admission: 5/3/2022  6:57 AM    YOB: 1970    Medical Record Number: [id number]    Attending Physician: Carolee Pleitez MD        Chief Complaints: Adhesive capsulitis of left shoulder [M75.02]  Adhesive capsulitis of left shoulder [M75.02]      History of Present Illness: Patient has a several month history of left shoulder pain she has a diagnosis of capsulitis.  She underwent manipulation did well initially however was unable to do a lot of her therapy and has had a return of her stiffness.  Plan is to proceed with shoulder arthroscopy capsular release possibly a decompression.  We will also do probably another manipulation.  She understands inherent risk of fracture and also risk of nerve injury with the capsular release.     Allergies: No Known Allergies    Medications:   Home Medications:  No current facility-administered medications on file prior to encounter.     Current Outpatient Medications on File Prior to Encounter   Medication Sig   • ALPRAZolam (XANAX) 0.25 MG tablet Take 1 tablet by mouth 3 (Three) Times a Day As Needed for Anxiety.   • aspirin 81 MG tablet Take 81 mg by mouth Daily.   • DULoxetine (CYMBALTA) 30 MG capsule Take 1 capsule by mouth Daily. (Patient taking differently: Take 30 mg by mouth Every Night.)   • DULoxetine (CYMBALTA) 60 MG capsule Take 1 capsule by mouth Daily. (Patient taking differently: Take 60 mg by mouth Every Morning.)   • meloxicam (MOBIC) 15 MG tablet 1 PO Daily with food.   • METOPROLOL TARTRATE PO Take 50 mg by mouth 2 (Two) Times a Day.   • omeprazole (priLOSEC) 20 MG capsule Take 1 capsule by mouth Daily. (Patient taking differently: Take 20 mg by mouth Every Night.)   • pravastatin (PRAVACHOL) 20 MG tablet Take 1 tablet by mouth Every Evening.   • topiramate (TOPAMAX) 200 MG tablet Take 1 tablet by mouth Daily. (Patient taking differently: Take 200 mg by mouth Every Night.)     Current  Medications:  Scheduled Meds:sodium chloride, 3 mL, Intravenous, Q12H      Continuous Infusions:lactated ringers, 9 mL/hr      PRN Meds:.•  fentanyl  •  fentaNYL citrate (PF)  •  lidocaine PF 1%  •  midazolam  •  sodium chloride    Past Medical History:   Diagnosis Date   • Adhesive capsulitis of left shoulder 12/10/2021   • Ankle sprain    • Anxiety    • Atrial fibrillation (HCC)    • Depression    • Diabetes mellitus (HCC)    • GERD (gastroesophageal reflux disease)    • Hemorrhoid    • Hyperlipidemia    • Migraines    • Panic attacks    • Renal stone    • Wrist sprain         Past Surgical History:   Procedure Laterality Date   •  SECTION     • CHOLECYSTECTOMY     • COLONOSCOPY     • ENDOSCOPY     • EXTRACORPOREAL SHOCK WAVE LITHOTRIPSY (ESWL) Right 2021    Procedure: EXTRACORPOREAL SHOCKWAVE LITHOTRIPSY CYSTOSCOPY;  Surgeon: Sidney Medellin MD;  [place] [facility] [place] [facility]  Service: Urology;  Laterality: Right;   • EYE MUSCLE SURGERY Bilateral    • HEMORRHOIDECTOMY     • HYSTERECTOMY     • JOINT MANIPULATION Left 2021    Procedure: SHOULDER MANIPULATION;  Surgeon: Carolee Pleitez MD;  [place] [facility] [place] [facility]  Service: Orthopedics;  Laterality: Left;   • TONSILLECTOMY     • TUBAL ABDOMINAL LIGATION          Social History     Occupational History   • Not on file   Tobacco Use   • Smoking status: Never Smoker   • Smokeless tobacco: Never Used   Vaping Use   • Vaping Use: Never used   Substance and Sexual Activity   • Alcohol use: Yes     Alcohol/week: 5.0 - 7.0 standard drinks     Types: 5 - 7 Shots of liquor per week   • Drug use: Never   • Sexual activity: Not Currently     Partners: Male     Birth control/protection: Post-menopausal      Social History     Social History Narrative   • Not on file        Family History   Problem Relation Age of Onset   • Depression Mother    • Kidney disease Mother    • Stroke Mother    • Hypertension Mother    • Diabetes Mother    • Heart disease  Father    • Cancer Father    • Depression Daughter    • Malig Hyperthermia Neg Hx        Review of Systems      Physical Exam: 51 y.o. female  General Appearance:    Alert, cooperative, in no acute distress                    There were no vitals filed for this visit.     Head:  Normocephalic, without obvious abnormality, atraumatic   Eyes:          Conjunctivae and sclerae normal, no pallor, corneas clear,    Ears:  Ears appear intact with no abnormalities noted   Throat: No oral lesions, no thrush, oral mucosa moist   Neck: No adenopathy, supple, trachea midline, no thyromegaly,    Back:   No kyphosis present, no scoliosis present, no skin lesions,      erythema or scars, no tenderness to percussion or                   palpation,range of motion normal   Lungs:   Clear to auscultation, respirations regular, even and                 unlabored    Heart:  Regular rhythm and normal rate               Chest Wall:  No abnormalities observed   Abdomen:   Normal bowel sounds, no masses, no organomegaly, soft    nontender, nondistended, no guarding, no rebound   tenderness   Rectal:   Deferred   Extremities:  Moves all extremities well, no edema,   no cyanosis, no redness   Pulses: Pulses palpable and equal bilaterally   Skin: No bleeding, bruising or rash   Lymph nodes: No palpable adenopathy   Neurologic: Appears neurologic intact             Assessment:  Patient Active Problem List   Diagnosis   • Right renal stone   • Adhesive capsulitis of left shoulder   • Encounter for screening mammogram for malignant neoplasm of breast   • Colon cancer screening   • Healthcare maintenance   • Diabetes mellitus (HCC)   • Anxiety   • GERD (gastroesophageal reflux disease)   • Primary insomnia   • Hyperlipidemia   • Migraines           Plan: All risks, benefits and alternatives were discussed.  Risks including but not exclusive to anesthetic complications, including death, MI, CVA, infection, bleeding DVT, PE,  fracture, residual  pain and need for future surgery.  Patient understood all and agrees to proceed.

## 2022-05-03 NOTE — ANESTHESIA POSTPROCEDURE EVALUATION
Patient: Marguerite Petersen    Procedure Summary     Date: 05/03/22 [facility]    Anesthesia Start: 0914 Anesthesia Stop: 1026    Procedure: SHOULDER ARTHROSCOPY, capsular release, debridement rotator curr, manipulation (Left Shoulder) Diagnosis:       Adhesive capsulitis of left shoulder      (Adhesive capsulitis of left shoulder [M75.02])    Surgeons: Carolee Pleitez MD Provider: Rasta Montero MD    Anesthesia Type: general ASA Status: 3          Anesthesia Type: general    Vitals  Vitals Value Taken Time   /79 05/03/22 1131   Temp 37.6 °C (99.6 °F) 05/03/22 1130   Pulse 76 05/03/22 1136   Resp 16 05/03/22 1130   SpO2 97 % 05/03/22 1136   Vitals shown include unvalidated device data.        Post Anesthesia Care and Evaluation    Patient location during evaluation: bedside  Patient participation: complete - patient participated  Level of consciousness: awake  Pain management: adequate  Airway patency: patent  Anesthetic complications: No anesthetic complications  PONV Status: controlled  Cardiovascular status: acceptable  Respiratory status: acceptable  Hydration status: acceptable    Comments: ---------------------------               05/03/22                      1130         ---------------------------   BP:          126/79         Pulse:         73           Resp:          16           Temp:   37.6 °C (99.6 °F)   SpO2:          98%         ---------------------------

## 2022-05-03 NOTE — ANESTHESIA PREPROCEDURE EVALUATION
Anesthesia Evaluation     no history of anesthetic complications:  NPO Solid Status: > 8 hours  NPO Liquid Status: > 2 hours           Airway   Mallampati: II  no difficulty expected  Dental    (+) lower dentures and upper dentures    Pulmonary - negative pulmonary ROS and normal exam   (-) COPD, asthma, sleep apnea, not a smoker    PE comment: nonlabored  Cardiovascular - normal exam    Rhythm: regular  Rate: normal    (+) dysrhythmias Atrial Fib,   (-) hypertension, valvular problems/murmurs, past MI, CAD, angina      Neuro/Psych  (+) headaches, psychiatric history (panic attacks) Anxiety,    (-) seizures, TIA, CVA  GI/Hepatic/Renal/Endo    (+)  GERD,  renal disease stones, diabetes mellitus type 2 well controlled,   (-) liver disease, no thyroid disorder    Musculoskeletal     (+) arthralgias,   Abdominal    Substance History      OB/GYN          Other                          Anesthesia Plan    ASA 3     general     intravenous induction     Anesthetic plan, all risks, benefits, and alternatives have been provided, discussed and informed consent has been obtained with: patient.

## 2022-05-06 ENCOUNTER — TREATMENT (OUTPATIENT)
Dept: PHYSICAL THERAPY | Facility: CLINIC | Age: 52
End: 2022-05-06

## 2022-05-06 DIAGNOSIS — Z47.89 ENCOUNTER FOR OTHER ORTHOPEDIC AFTERCARE: ICD-10-CM

## 2022-05-06 DIAGNOSIS — M25.612 DECREASED ROM OF LEFT SHOULDER: ICD-10-CM

## 2022-05-06 DIAGNOSIS — M75.02 ADHESIVE CAPSULITIS OF LEFT SHOULDER: Primary | ICD-10-CM

## 2022-05-06 PROCEDURE — 97110 THERAPEUTIC EXERCISES: CPT | Performed by: PHYSICAL THERAPIST

## 2022-05-06 PROCEDURE — 97530 THERAPEUTIC ACTIVITIES: CPT | Performed by: PHYSICAL THERAPIST

## 2022-05-06 PROCEDURE — 97161 PT EVAL LOW COMPLEX 20 MIN: CPT | Performed by: PHYSICAL THERAPIST

## 2022-05-06 NOTE — PROGRESS NOTES
Physical Therapy Initial Evaluation and Plan of Care        Patient: Marguerite Petersen   : 1970  Visit Diagnoses:     ICD-10-CM ICD-9-CM   1. Adhesive capsulitis of left shoulder  M75.02 726.0   2. Encounter for other orthopedic aftercare  Z47.89 V54.89   3. Decreased ROM of left shoulder  M25.612 719.51     Referring practitioner: Carolee Pleitez MD  Date of Initial Visit: 2022  Today's Date: 2022  Patient seen for 1 sessions           Subjective Questionnaire: QuickDASH: 65.91%      Subjective Evaluation    History of Present Illness  Date of surgery: 5/3/2022  Mechanism of injury: Patient reports ha shoulder manipulation 21 but did not do her therapy after.  States she has been taking care of her mother the past several months and she  two weeks ago.      Had surgery on Tuesday for a second manipulation, decompression and capsular release.    PMH: left shoulder manipulation, DM, migraine HA, kidney disease, depression, anxiety, panic attacks, GERD, gall bladder removed, hysterectomy, kidney stone removal, tubal ligation.    Subjective comment: Patient reports recent left shoulder surgery.  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[employer]  Current pain ratin  At best pain ratin  At worst pain ratin  Location: left shoulder/posterior  Quality: sharp  Relieving factors: medications and ice  Aggravating factors: movement, overhead activity and outstretched reach  Progression: improved    Social Support  Lives in: one-story house  Lives with: alone (currently having daugter stay with her)    Treatments  Previous treatment: physical therapy  Patient Goals  Patient goals for therapy: decreased pain, increased strength, increased motion and return to work             Objective          Palpation   Left   Hypertonic in the levator scapulae and upper trapezius.   Tenderness of the anterior deltoid, levator scapulae, middle deltoid, posterior deltoid and upper trapezius.     Tenderness     Left  Shoulder   Tenderness in the acromion.     Passive Range of Motion   Left Shoulder   Flexion: 120 degrees with pain  Extension: 40 degrees   Abduction: 87 degrees with pain  Internal rotation 0°: 55 degrees   Internal rotation 90°: 22 degrees with pain    Scapular Mobility   Left Shoulder   Scapular mobility: fair    Joint Play   Left Shoulder  Hypomobile in the anterior capsule, posterior capsule and inferior capsule.    Strength/Myotome Testing     Left Shoulder     Planes of Motion   Flexion: 5   Extension: 5   Abduction: 5   Adduction: 5   External rotation at 0°: 5   Internal rotation at 0°: 5     Isolated Muscles   Biceps: 5   Triceps: 5     Tests     Additional Tests Details  Deferred due to post-op status          Assessment & Plan     Assessment  Impairments: abnormal muscle tone, abnormal or restricted ROM, activity intolerance, lacks appropriate home exercise program and pain with function  Functional Limitations: lifting, uncomfortable because of pain, reaching behind back and reaching overhead  Assessment details: Marguerite Petersen is a pleasant 51 y.o. female that presents with signs and symptoms consistent with the above diagnosis. She has limited ROM, shoulder girdle muscle guarding, limited scapular mobility, GH capsular tightness, and pain limited function with left UE.  Pt will benefit from skilled PT services in order to address listed impairments, decrease pain and restore function.      Prognosis: good  Prognosis details: Patient demonstrates good rehab potential as evidenced by high motivation to participate with PT POC and good family (daughter) support.        Goals  Plan Goals: Short Term (3 wks):  1. Patient to have increased left shoulder ROM to 160 degrees flexion and abduction, 70 degrees ER and IR.  2. Patient will have scapular mobility WNL.  3. Patient will report decreased pain level to 3/10 at worst to allow for increased comfort with functional tasks.    Long Term Goal (8 wks):  1.   Patient will have improved DASH score of 15% or less.  2.  Patient will have increased GHJ capsular mobility to WNL.  3.  Patient will demonstrate functional reach patterns without pain limitations.  4.  Patient will be independent in performance of HEP for carryover upon discharge from skilled PT services.      Plan  Therapy options: will be seen for skilled therapy services  Planned modality interventions: TENS, ultrasound, cryotherapy, thermotherapy (hydrocollator packs) and iontophoresis  Planned therapy interventions: manual therapy, soft tissue mobilization, strengthening, stretching, functional ROM exercises, joint mobilization, home exercise program, neuromuscular re-education, therapeutic activities, flexibility, body mechanics training and postural training  Frequency: 2x week  Duration in weeks: 8  Treatment plan discussed with: patient  Plan details: Pt was educated on the importance of their HEP and their current need for continued skilled physical therapy. Patients goals and potential limitations were discussed and pt is in agreement with current plan of care and treatment emphasis.                History # of Personal Factors and/or Comorbidities: HIGH (3+)  Examination of Body System(s): # of elements: LOW (1-2)  Clinical Presentation: STABLE   Clinical Decision Making: LOW       Timed:         Manual Therapy:         mins  25683;     Therapeutic Exercise:    20     mins  62820;     Neuromuscular Angie:        mins  64203;    Therapeutic Activity:     10     mins  06739;     Gait Training:           mins  88399;     Ultrasound:          mins  92578;    Ionto                                  mins  73173  Self Care                            mins  21536  Canalith Repos         mins  04033  Orthotic MGMT/Train         mins  46488    Un-Timed:  Electrical Stimulation:         mins  75564 ( );  Dry Needling:          mins  33789 self-pay;  Dry Needling:          mins  39676 self-pay  Traction           mins  69379  Low Eval     20     mins  78625  Mod Eval          mins  08561  High Eval                            mins  10833    Timed Treatment:   30   mins   Total Treatment:     50   mins      PT SIGNATURE: Aminta Ariza PT     License Number: [id number]  Electronically signed by Aminta Ariza PT, 05/06/22, 10:44 AM EDT      DATE TREATMENT INITIATED: 5/6/2022    Initial Certification  Certification Period: 5/6/2022 thru 8/3/2022  I certify that the therapy services are furnished while this patient is under my care.  The services outlined above are required by this patient, and will be reviewed every 90 days.     PHYSICIAN:   Carolee Pleitez MD     NPI: [id number]  DATE:         Please sign and return via fax to [phone]. Thank you, [facility].

## 2022-05-06 NOTE — PATIENT INSTRUCTIONS
Access Code: [email or web address]  URL: [email or web address]  Date: 05/06/2022  Prepared by: Aminta Ariza    Exercises  Standing Single Arm Shoulder Flexion Towel Slide at Table Top - 1 x daily - 7 x weekly - 1 sets - 5 reps - 10 sec. hold  Circular Shoulder Pendulum with Table Support - 1 x daily - 7 x weekly - 2 sets - 10 reps  Flexion-Extension Shoulder Pendulum with Table Support - 1 x daily - 7 x weekly - 3 sets - 10 reps  Horizontal Shoulder Pendulum with Table Support - 1 x daily - 7 x weekly - 3 sets - 10 reps  Seated Shoulder External Rotation PROM on Table - 1 x daily - 7 x weekly - 1 sets - 10 reps - 10 sec. hold  Seated Shoulder Flexion AAROM with Pulley Behind - 1 x daily - 7 x weekly - 1 sets - 10 reps - 10 sec. hold  Seated Shoulder Scaption AAROM with Pulley at Side - 1 x daily - 7 x weekly - 1 sets - 10 reps - 10 sec. hold  Seated Shoulder Abduction AAROM with Pulley Behind - 1 x daily - 7 x weekly - 1 sets - 10 reps - 10 sec. hold

## 2022-05-09 ENCOUNTER — TREATMENT (OUTPATIENT)
Dept: PHYSICAL THERAPY | Facility: CLINIC | Age: 52
End: 2022-05-09

## 2022-05-09 DIAGNOSIS — M25.612 DECREASED ROM OF LEFT SHOULDER: ICD-10-CM

## 2022-05-09 DIAGNOSIS — Z47.89 ENCOUNTER FOR OTHER ORTHOPEDIC AFTERCARE: ICD-10-CM

## 2022-05-09 DIAGNOSIS — M75.02 ADHESIVE CAPSULITIS OF LEFT SHOULDER: Primary | ICD-10-CM

## 2022-05-09 PROCEDURE — 97140 MANUAL THERAPY 1/> REGIONS: CPT | Performed by: PHYSICAL THERAPIST

## 2022-05-09 PROCEDURE — 97110 THERAPEUTIC EXERCISES: CPT | Performed by: PHYSICAL THERAPIST

## 2022-05-09 PROCEDURE — 97530 THERAPEUTIC ACTIVITIES: CPT | Performed by: PHYSICAL THERAPIST

## 2022-05-09 NOTE — PROGRESS NOTES
Physical Therapy Daily Progress Note    Patient: Marguerite Petersen   : 1970  Diagnosis/ICD-10 Code:  Adhesive capsulitis of left shoulder [M75.02]  Referring practitioner: Carolee Pleitez MD  Date of Initial Visit: Type: THERAPY  Noted: 2022  Today's Date: 2022  Patient seen for 2 sessions         Marguerite Petersen reports: only did pendulums at home so far today. Pain is okay so far.     Subjective     Objective   See Exercise, Manual, and Modality Logs for complete treatment.       Assessment/Plan  Subjectively, pt reports no increase of pain or discomfort with interventions performed today. Performed well with continued shoulder mobility interventions. Continues to demonstrate very guarded PROM in all planes. Pt declined ice at end of session, suggested doing at home. Continues to benefit from verbal/tactile cues to ensure proper form and technique for exercise performance.     Progress per Plan of Care           Manual Therapy:    12     mins  87642;  Therapeutic Exercise:    16     mins  42767;     Neuromuscular Angie:        mins  09061;    Therapeutic Activity:     10     mins  36004;     Gait Training:           mins  99115;     Ultrasound:          mins  91647;    Electrical Stimulation:         mins  48928 ( );  Dry Needling          mins self-pay    Timed Treatment:   38   mins   Total Treatment:     38   mins    Zunilda Matamoros PTA  Physical Therapist Assistant [id number]

## 2022-05-10 ENCOUNTER — TELEPHONE (OUTPATIENT)
Dept: ORTHOPEDIC SURGERY | Facility: CLINIC | Age: 52
End: 2022-05-10

## 2022-05-10 NOTE — TELEPHONE ENCOUNTER
----- Message from Carolee Pleitez MD sent at 5/10/2022  3:00 PM EDT -----  Regarding: FW: Thrush      ----- Message -----  From: Selena Patel MA  Sent: 5/10/2022  11:59 AM EDT  To: Carolee Pleitez MD  Subject: Thrush                                           Pleas review    ----- Message -----  From: Marguerite Alonso  Sent: 5/10/2022  11:56 AM EDT  To: [facility]  Subject: Thrush                                           I have thrush on my tongue.  Can you send in a script   Thanks Marguerite alonso

## 2022-05-10 NOTE — TELEPHONE ENCOUNTER
Patient returned my call.  She is got a white coating on her tongue and is complaining of no appetite.  The only antibiotic that she had been on was the dose she got preoperatively prior to her surgery.  Patient states she has had this for the last few days.  The only other new medication she is on is Percocet.  New prescription was sent to [facility] pharmacy at [phone] for nystatin swish and swallow, 100 cc, directions are to take 5cc 4 times daily for the next 5 days Dr. Pleitez.  Patient has been instructed that if her symptoms do not improve would recommend that she get in touch with her PCP

## 2022-05-10 NOTE — TELEPHONE ENCOUNTER
Call returned to the patient.  I was unable to reach her but I did leave a message for her to contact me the office

## 2022-05-12 ENCOUNTER — TREATMENT (OUTPATIENT)
Dept: PHYSICAL THERAPY | Facility: CLINIC | Age: 52
End: 2022-05-12

## 2022-05-12 DIAGNOSIS — M25.612 DECREASED ROM OF LEFT SHOULDER: ICD-10-CM

## 2022-05-12 DIAGNOSIS — M75.02 ADHESIVE CAPSULITIS OF LEFT SHOULDER: Primary | ICD-10-CM

## 2022-05-12 DIAGNOSIS — Z47.89 ENCOUNTER FOR OTHER ORTHOPEDIC AFTERCARE: ICD-10-CM

## 2022-05-12 PROCEDURE — 97140 MANUAL THERAPY 1/> REGIONS: CPT | Performed by: PHYSICAL THERAPIST

## 2022-05-12 PROCEDURE — 97110 THERAPEUTIC EXERCISES: CPT | Performed by: PHYSICAL THERAPIST

## 2022-05-12 NOTE — PROGRESS NOTES
Physical Therapy Daily Progress Note      Patient: Marguerite Petersen   : 1970  Treatment Diagnosis:     ICD-10-CM ICD-9-CM   1. Adhesive capsulitis of left shoulder  M75.02 726.0   2. Encounter for other orthopedic aftercare  Z47.89 V54.89   3. Decreased ROM of left shoulder  M25.612 719.51     Referring practitioner: Carolee Pleitez MD  Date of Initial Visit: Type: THERAPY  Noted: 2022  Today's Date: 2022  Patient seen for 3 sessions           Subjective   Patient reports she has not needed to take a pain pill in the past two days.  States she has not been doing her exercises at home.    Objective     See Exercise, Manual, and Modality Logs for complete treatment.       Assessment/Plan  Patient continues with restricted left shoulder ROM and pain at end range stretch.  Demonstrates pain avoidance behaviors with exercises.  Requires cueing and step by step instruction for exercises.  Progress per Plan of Care           Timed:         Manual Therapy:    15     mins  60958;     Therapeutic Exercise:    20     mins  91307;     Neuromuscular Angie:        mins  98867;    Therapeutic Activity:          mins  91362;     Gait Training:           mins  69073;     Ultrasound:          mins  92921;    Ionto                                  mins  49330  Self Care                            mins  16120  Canalith Repos         mins  70684  Orthotic MGMT/Train         mins  29152    Un-Timed:  Electrical Stimulation:         mins  74970 ( );  Dry Needling:          mins  18370 self-pay;  Dry Needling:          mins  10914 self-pay  Traction          mins  52383      Timed Treatment:   35   mins   Total Treatment:     45   mins        PT SIGNATURE: Aminta Ariza PT     License Number: [id number]  Electronically signed by Aminta Ariza PT, 22, 12:16 PM EDT

## 2022-05-13 ENCOUNTER — OFFICE VISIT (OUTPATIENT)
Dept: ORTHOPEDIC SURGERY | Facility: CLINIC | Age: 52
End: 2022-05-13

## 2022-05-13 VITALS — WEIGHT: 141.6 LBS | BODY MASS INDEX: 26.06 KG/M2 | TEMPERATURE: 97.3 F | HEIGHT: 62 IN

## 2022-05-13 DIAGNOSIS — M75.02 ADHESIVE CAPSULITIS OF LEFT SHOULDER: Primary | ICD-10-CM

## 2022-05-13 DIAGNOSIS — Z98.890 S/P ARTHROSCOPY OF SHOULDER: ICD-10-CM

## 2022-05-13 PROCEDURE — 99024 POSTOP FOLLOW-UP VISIT: CPT | Performed by: ORTHOPAEDIC SURGERY

## 2022-05-13 NOTE — PROGRESS NOTES
Patient: Marguerite Petersen  YOB: 1970  Date of Service: 5/13/2022    Chief Complaints: Left shoulder pain    Subjective:    History of Present Illness: Pt is seen in the office today with complaints of acute left shoulder pain she is status post manipulation and capsular release shoulder is doing much better she states she is doing some of her therapy admits to not doing all of it.  She still struggling with her mother's death and I did encourage her to reach out to [place] again and I sent a message to her primary care about this as well.          Allergies: No Known Allergies    Medications:   Home Medications:  Current Outpatient Medications on File Prior to Visit   Medication Sig   • ALPRAZolam (XANAX) 0.25 MG tablet Take 1 tablet by mouth 3 (Three) Times a Day As Needed for Anxiety.   • aspirin 81 MG tablet Take 81 mg by mouth Daily.   • DULoxetine (CYMBALTA) 30 MG capsule Take 1 capsule by mouth Daily. (Patient taking differently: Take 30 mg by mouth Every Night.)   • DULoxetine (CYMBALTA) 60 MG capsule Take 1 capsule by mouth Daily. (Patient taking differently: Take 60 mg by mouth Every Morning.)   • meloxicam (MOBIC) 15 MG tablet 1 PO Daily with food.   • METOPROLOL TARTRATE PO Take 50 mg by mouth 2 (Two) Times a Day.   • nystatin (MYCOSTATIN) 100,000 unit/mL suspension Swish and swallow 5 mL 4 (Four) Times a Day for 5 days.   • omeprazole (priLOSEC) 20 MG capsule Take 1 capsule by mouth Daily. (Patient taking differently: Take 20 mg by mouth Every Night.)   • ondansetron (Zofran) 4 MG tablet Take 1 tablet by mouth Every 8 (Eight) Hours As Needed for Nausea or Vomiting.   • oxyCODONE-acetaminophen (PERCOCET) 5-325 MG per tablet take 1 tablet by mouth every 4 hours as needed for severe pain   • pravastatin (PRAVACHOL) 20 MG tablet Take 1 tablet by mouth Every Evening.   • topiramate (TOPAMAX) 200 MG tablet Take 1 tablet by mouth Daily. (Patient taking differently: Take 200 mg by mouth Every  Night.)   • traZODone (DESYREL) 100 MG tablet Take 2 tablets by mouth Every Night.     No current facility-administered medications on file prior to visit.     Current Medications:  Scheduled Meds:  Continuous Infusions:No current facility-administered medications for this visit.    PRN Meds:.    I have reviewed the patient's medical history in detail and updated the computerized patient record.  Review and summarization of old records include:    Past Medical History:   Diagnosis Date   • Adhesive capsulitis of left shoulder 12/10/2021   • Ankle sprain    • Anxiety    • Atrial fibrillation (HCC)    • Depression    • Diabetes mellitus (HCC)    • GERD (gastroesophageal reflux disease)    • Hemorrhoid    • Hyperlipidemia    • Migraines    • Panic attacks    • Renal stone    • Wrist sprain         Past Surgical History:   Procedure Laterality Date   •  SECTION     • CHOLECYSTECTOMY     • COLONOSCOPY     • ENDOSCOPY     • EXTRACORPOREAL SHOCK WAVE LITHOTRIPSY (ESWL) Right 2021    Procedure: EXTRACORPOREAL SHOCKWAVE LITHOTRIPSY CYSTOSCOPY;  Surgeon: Sidney Medellin MD;  [facility]: [facility] OR [facility];  Service: Urology;  Laterality: Right;   • EYE MUSCLE SURGERY Bilateral    • HEMORRHOIDECTOMY     • HYSTERECTOMY     • JOINT MANIPULATION Left 2021    Procedure: SHOULDER MANIPULATION;  Surgeon: Carolee Pleitez MD;  Location: [facility] OR [facility];  Service: Orthopedics;  Laterality: Left;   • SHOULDER ARTHROSCOPY Left 5/3/2022    Procedure: SHOULDER ARTHROSCOPY, capsular release, debridement rotator curr, manipulation;  Surgeon: Carolee Pleitez MD;  Location: [facility];  Service: Orthopedics;  Laterality: Left;   • TONSILLECTOMY     • TUBAL ABDOMINAL LIGATION          Social History     Occupational History   • Not on file   Tobacco Use   • Smoking status: Never Smoker   • Smokeless tobacco: Never Used   Vaping Use   • Vaping Use: Never used   Substance and Sexual Activity   • Alcohol use: Yes      Alcohol/week: 5.0 - 7.0 standard drinks     Types: 5 - 7 Shots of liquor per week   • Drug use: Never   • Sexual activity: Not Currently     Partners: Male     Birth control/protection: Post-menopausal      Social History     Social History Narrative   • Not on file        Family History   Problem Relation Age of Onset   • Depression Mother    • Kidney disease Mother    • Stroke Mother    • Hypertension Mother    • Diabetes Mother    • Heart disease Father    • Cancer Father    • Depression Daughter    • Malig Hyperthermia Neg Hx        ROS: 14 point review of systems was performed and was negative except for documented findings in HPI and today's encounter.     Allergies: No Known Allergies  Constitutional:  Denies fever, shaking or chills   Eyes:  Denies change in visual acuity   HENT:  Denies nasal congestion or sore throat   Respiratory:  Denies cough or shortness of breath   Cardiovascular:  Denies chest pain or severe LE edema   GI:  Denies abdominal pain, nausea, vomiting, bloody stools or diarrhea   Musculoskeletal:  Numbness, tingling, or loss of motor function only as noted above in history of present illness.  : Denies painful urination or hematuria  Integument:  Denies rash, lesion or ulceration   Neurologic:  Denies headache or focal weakness  Endocrine:  Denies lymphadenopathy  Psych:  Denies confusion or change in mental status   Hem:  Denies active bleeding      Physical Exam: 51 y.o. female  Wt Readings from Last 3 Encounters:   04/28/22 65.3 kg (144 lb)   04/14/22 64.4 kg (142 lb)   03/29/22 66.7 kg (147 lb)       There is no height or weight on file to calculate BMI.  No height and weight on file for this encounter.  There were no vitals filed for this visit.  Vital signs reviewed.   General Appearance:    Alert, cooperative, in no acute distress                    Ortho exam      Incisions look great she can active flex to about 160 passively get her to almost 180 external rotation to 50  rotator cuff strength is good     .time    Assessment: Status post manipulation and capsular release I think she is doing great work and I take her stitches out today I want her to continue working on her range of motion.  She would like to go back to work on Monday with a few restrictions which I think is fine again, I sent a letter to her primary care about helping her with coping with her mother's death and also encouraged her to reach out to hospice.  She assures me she is not in a state of mind where she wants to harm herself.    Plan:   Follow up as indicated.  Ice, elevate, and rest as needed.  Discussed conservative measures of pain control including ice, bracing.      Carolee Pleitez M.D.

## 2022-05-16 ENCOUNTER — OFFICE VISIT (OUTPATIENT)
Dept: INTERNAL MEDICINE | Facility: CLINIC | Age: 52
End: 2022-05-16

## 2022-05-16 VITALS
SYSTOLIC BLOOD PRESSURE: 111 MMHG | BODY MASS INDEX: 27.07 KG/M2 | HEART RATE: 106 BPM | OXYGEN SATURATION: 100 % | DIASTOLIC BLOOD PRESSURE: 75 MMHG | HEIGHT: 62 IN | WEIGHT: 147.1 LBS

## 2022-05-16 DIAGNOSIS — F41.9 ANXIETY: Primary | ICD-10-CM

## 2022-05-16 DIAGNOSIS — F41.0 PANIC ATTACKS: ICD-10-CM

## 2022-05-16 PROCEDURE — 99214 OFFICE O/P EST MOD 30 MIN: CPT | Performed by: FAMILY MEDICINE

## 2022-05-16 RX ORDER — ALPRAZOLAM 0.25 MG/1
0.25 TABLET ORAL 3 TIMES DAILY PRN
Qty: 90 TABLET | Refills: 0 | Status: SHIPPED | OUTPATIENT
Start: 2022-05-16 | End: 2022-06-01

## 2022-05-16 NOTE — PROGRESS NOTES
"Chief Complaint  follow up to anxiety    Subjective          Marguerite Petersen presents to [facility]IMARY [facility]  History of Present Illness  Patient with recent increase stressors with siblings due to death of their mother patient is reached out to counselor Alesha Oleary, help with coping mechanisms  She feels she needs to increase rates of panic attacks throughout the day and she like to get back on her Xanax she stopped taking the oxycodone for shoulder pain she was counseled as to the interaction between oxycodone and Xanax including increased somnolence and death due to respiratory drive suppressed.  She is aware and will not take medications simultaneously  Objective   Vital Signs:  /75 (BP Location: Right arm, Patient Position: Sitting, Cuff Size: Adult)   Pulse 106   Ht 157.5 cm (62\")   Wt 66.7 kg (147 lb 1.6 oz)   SpO2 100%   BMI 26.90 kg/m²           Physical Exam  Vitals and nursing note reviewed.   Cardiovascular:      Rate and Rhythm: Normal rate.      Pulses: Normal pulses.   Pulmonary:      Effort: Pulmonary effort is normal.   Neurological:      Mental Status: She is alert. Mental status is at baseline.   Psychiatric:         Behavior: Behavior normal.         Judgment: Judgment normal.      Comments: Anxious depressed        Result Review :                 Assessment and Plan    Diagnoses and all orders for this visit:    1. Anxiety (Primary)  -     ALPRAZolam (Xanax) 0.25 MG tablet; Take 1 tablet by mouth 3 (Three) Times a Day As Needed for Anxiety.  Dispense: 90 tablet; Refill: 0    2. Panic attacks    Continue Cymbalta 60 mg daily discontinue Cymbalta 30 minutes milligrams at bedtime  Continue with counselor         Follow Up   Return in about 1 month (around 6/16/2022), or if symptoms worsen or fail to improve, for Recheck.  Patient was given instructions and counseling regarding her condition or for health maintenance advice. Please see specific information pulled into " the AVS if appropriate.

## 2022-06-01 ENCOUNTER — OFFICE VISIT (OUTPATIENT)
Dept: INTERNAL MEDICINE | Facility: CLINIC | Age: 52
End: 2022-06-01

## 2022-06-01 ENCOUNTER — LAB (OUTPATIENT)
Dept: LAB | Facility: HOSPITAL | Age: 52
End: 2022-06-01

## 2022-06-01 VITALS
BODY MASS INDEX: 26.92 KG/M2 | SYSTOLIC BLOOD PRESSURE: 121 MMHG | WEIGHT: 146.3 LBS | HEIGHT: 62 IN | HEART RATE: 84 BPM | OXYGEN SATURATION: 96 % | DIASTOLIC BLOOD PRESSURE: 79 MMHG

## 2022-06-01 DIAGNOSIS — E11.59 TYPE 2 DIABETES MELLITUS WITH OTHER CIRCULATORY COMPLICATION, WITH LONG-TERM CURRENT USE OF INSULIN: ICD-10-CM

## 2022-06-01 DIAGNOSIS — F41.0 PANIC ATTACKS: ICD-10-CM

## 2022-06-01 DIAGNOSIS — F51.01 PRIMARY INSOMNIA: Primary | ICD-10-CM

## 2022-06-01 DIAGNOSIS — Z79.4 TYPE 2 DIABETES MELLITUS WITH OTHER CIRCULATORY COMPLICATION, WITH LONG-TERM CURRENT USE OF INSULIN: ICD-10-CM

## 2022-06-01 DIAGNOSIS — F41.9 ANXIETY: ICD-10-CM

## 2022-06-01 DIAGNOSIS — M75.02 ADHESIVE CAPSULITIS OF LEFT SHOULDER: ICD-10-CM

## 2022-06-01 LAB — HBA1C MFR BLD: 7.1 % (ref 4.8–5.6)

## 2022-06-01 PROCEDURE — 83036 HEMOGLOBIN GLYCOSYLATED A1C: CPT | Performed by: FAMILY MEDICINE

## 2022-06-01 PROCEDURE — 99214 OFFICE O/P EST MOD 30 MIN: CPT | Performed by: FAMILY MEDICINE

## 2022-06-01 PROCEDURE — 36415 COLL VENOUS BLD VENIPUNCTURE: CPT | Performed by: FAMILY MEDICINE

## 2022-06-01 RX ORDER — DULOXETIN HYDROCHLORIDE 60 MG/1
60 CAPSULE, DELAYED RELEASE ORAL 2 TIMES DAILY
Qty: 180 CAPSULE | Refills: 2
Start: 2022-06-01 | End: 2022-07-26 | Stop reason: SDUPTHER

## 2022-06-01 RX ORDER — TRAZODONE HYDROCHLORIDE 100 MG/1
100 TABLET ORAL NIGHTLY
Qty: 30 TABLET | Refills: 2
Start: 2022-06-01 | End: 2022-09-06

## 2022-06-01 RX ORDER — AZITHROMYCIN 250 MG/1
TABLET, FILM COATED ORAL
Qty: 6 TABLET | Refills: 0 | Status: SHIPPED | OUTPATIENT
Start: 2022-06-01 | End: 2022-06-21

## 2022-06-01 RX ORDER — MELOXICAM 15 MG/1
TABLET ORAL
Qty: 30 TABLET | Refills: 1 | Status: SHIPPED | OUTPATIENT
Start: 2022-06-01 | End: 2022-08-01

## 2022-06-01 RX ORDER — ALPRAZOLAM 0.25 MG/1
0.25 TABLET ORAL 2 TIMES DAILY PRN
Qty: 60 TABLET | Refills: 0
Start: 2022-06-01 | End: 2022-06-21 | Stop reason: SDUPTHER

## 2022-06-01 RX ORDER — TIZANIDINE 2 MG/1
2 TABLET ORAL NIGHTLY
Qty: 30 TABLET | Refills: 1 | Status: SHIPPED | OUTPATIENT
Start: 2022-06-01 | End: 2022-08-01

## 2022-06-01 NOTE — PROGRESS NOTES
"Chief Complaint  follow up to hospital visit     Subjective        Marguerite Petersen presents to [facility]  History of Present Illness  Patient follows up for ongoing treatment for anxiety she was recently 1 week at [facility] after taking Ativan from her mother and oxycodone combination which she had for recent shoulder pain.  She needs refills for her sleep medication as well  They recommended increasing her duloxetine to 120 mg and she is doing well with that would like a refill on her tizanidine and meloxicam for anti-inflammatory benefit for her shoulder, she is followed by Alesha Oleary therapist.  He is committed to improving her outlook on life and is not suicidal  Objective   Vital Signs:  /79 (BP Location: Left arm, Patient Position: Sitting, Cuff Size: Adult)   Pulse 84   Ht 157.5 cm (62\")   Wt 66.4 kg (146 lb 4.8 oz)   SpO2 96%   BMI 26.76 kg/m²           Physical Exam  Vitals and nursing note reviewed.   HENT:      Head: Normocephalic and atraumatic.   Cardiovascular:      Rate and Rhythm: Normal rate and regular rhythm.      Pulses: Normal pulses.   Skin:     General: Skin is warm and dry.   Neurological:      Mental Status: She is alert.   Psychiatric:         Mood and Affect: Mood normal.         Behavior: Behavior normal.         Thought Content: Thought content normal.         Judgment: Judgment normal.        Result Review :    Common labs    Common Labsle 12/10/21 12/10/21 12/10/21 3/23/22 3/23/22 3/23/22 3/23/22 4/28/22 4/28/22    1543 1543 1543 0826 0826 0826 0826 0744 0744   Glucose  146 (A)    133 (A)   177 (A)   BUN  9    17   10   Creatinine  1.02 (A)    1.20 (A)   1.05 (A)   eGFR Non African Am  57 (A)          Sodium  140    140   143   Potassium  3.8    4.2   4.2   Chloride  107    107   109 (A)   Calcium  9.7    9.6   9.1   Albumin      4.80      Total Bilirubin      0.4      Alkaline Phosphatase      125 (A)      AST (SGOT)      18      ALT (SGPT)    "   22      WBC 9.68       6.56    Hemoglobin 15.5       14.8    Hematocrit 45.8       45.3    Platelets 214       186    Total Cholesterol       164     Triglycerides       157 (A)     HDL Cholesterol       41     LDL Cholesterol        95     Hemoglobin A1C   6.80 (A) 6.80 (A)        Microalbumin, Urine     1.2       (A) Abnormal value                      Assessment and Plan   Diagnoses and all orders for this visit:    1. Primary insomnia (Primary)  -     traZODone (DESYREL) 100 MG tablet; Take 1 tablet by mouth Every Night.  Dispense: 30 tablet; Refill: 2    2. Anxiety  -     DULoxetine (CYMBALTA) 60 MG capsule; Take 1 capsule by mouth 2 (Two) Times a Day.  Dispense: 180 capsule; Refill: 2  -     ALPRAZolam (Xanax) 0.25 MG tablet; Take 1 tablet by mouth 2 (Two) Times a Day As Needed for Anxiety.  Dispense: 60 tablet; Refill: 0    3. Adhesive capsulitis of left shoulder  -     meloxicam (MOBIC) 15 MG tablet; 1 PO Daily with food.  Dispense: 30 tablet; Refill: 1  -     tiZANidine (ZANAFLEX) 2 MG tablet; Take 1 tablet by mouth Every Night.  Dispense: 30 tablet; Refill: 1    4. Type 2 diabetes mellitus with other circulatory complication, with long-term current use of insulin (HCC)  -     Hemoglobin A1c    5. Panic attacks    Other orders  -     azithromycin (Zithromax Z-Maldonado) 250 MG tablet; Take 2 tablets by mouth on day 1, then 1 tablet daily on days 2-5  Dispense: 6 tablet; Refill: 0    Xanax dose reduced from 3 times a day to 2 times a day most recent prescription was filled May 20          Follow Up   No follow-ups on file.  Patient was given instructions and counseling regarding her condition or for health maintenance advice. Please see specific information pulled into the AVS if appropriate.

## 2022-06-07 ENCOUNTER — TELEPHONE (OUTPATIENT)
Dept: INTERNAL MEDICINE | Facility: CLINIC | Age: 52
End: 2022-06-07

## 2022-06-21 ENCOUNTER — OFFICE VISIT (OUTPATIENT)
Dept: INTERNAL MEDICINE | Facility: CLINIC | Age: 52
End: 2022-06-21

## 2022-06-21 VITALS
BODY MASS INDEX: 27.68 KG/M2 | SYSTOLIC BLOOD PRESSURE: 112 MMHG | DIASTOLIC BLOOD PRESSURE: 70 MMHG | WEIGHT: 150.4 LBS | OXYGEN SATURATION: 97 % | HEART RATE: 74 BPM | HEIGHT: 62 IN

## 2022-06-21 DIAGNOSIS — F41.9 ANXIETY: Primary | ICD-10-CM

## 2022-06-21 DIAGNOSIS — F51.01 PRIMARY INSOMNIA: ICD-10-CM

## 2022-06-21 DIAGNOSIS — F41.0 PANIC ATTACKS: ICD-10-CM

## 2022-06-21 PROCEDURE — 99214 OFFICE O/P EST MOD 30 MIN: CPT | Performed by: FAMILY MEDICINE

## 2022-06-21 RX ORDER — ALPRAZOLAM 0.25 MG/1
0.25 TABLET ORAL 3 TIMES DAILY PRN
Qty: 90 TABLET | Refills: 0 | Status: SHIPPED | OUTPATIENT
Start: 2022-06-21 | End: 2022-07-20 | Stop reason: SDUPTHER

## 2022-06-21 RX ORDER — METOPROLOL TARTRATE 50 MG/1
50 TABLET, FILM COATED ORAL 2 TIMES DAILY
COMMUNITY
Start: 2022-05-25 | End: 2023-01-25 | Stop reason: SDUPTHER

## 2022-06-21 NOTE — PROGRESS NOTES
"Chief Complaint  follow up to insomnia and follow up to anxiety    Subjective        Marguerite Petersen presents to [facility]  History of Present Illness  Patient follows up for adjustments to anxiety insomnia medications she is sleeping much better with the trazodone at 100 mg daily  Is increased her duloxetine to 60 mg twice a day  Is also taking the Xanax 0.25 mg 3 times a day she feels stable and she like to continue with this course of treatment as she settles her mother's estate  She has no chest pain no shortness of breath no sweats or chills  Objective   Vital Signs:  /70 (BP Location: Right arm, Patient Position: Sitting, Cuff Size: Adult)   Pulse 74   Ht 157.5 cm (62\")   Wt 68.2 kg (150 lb 6.4 oz)   SpO2 97%   BMI 27.51 kg/m²   Estimated body mass index is 27.51 kg/m² as calculated from the following:    Height as of this encounter: 157.5 cm (62\").    Weight as of this encounter: 68.2 kg (150 lb 6.4 oz).          Physical Exam  Vitals and nursing note reviewed.   Constitutional:       Appearance: Normal appearance.   HENT:      Head: Normocephalic and atraumatic.   Cardiovascular:      Rate and Rhythm: Normal rate and regular rhythm.      Pulses: Normal pulses.      Heart sounds: Normal heart sounds.   Pulmonary:      Breath sounds: Normal breath sounds.   Neurological:      General: No focal deficit present.      Mental Status: She is alert.   Psychiatric:         Mood and Affect: Mood normal.         Behavior: Behavior normal.         Thought Content: Thought content normal.         Judgment: Judgment normal.        Result Review :    Common labs    Common Labsle 3/23/22 3/23/22 3/23/22 3/23/22 4/28/22 4/28/22 6/1/22    0826 0826 0826 0826 0744 0744    Glucose   133 (A)   177 (A)    BUN   17   10    Creatinine   1.20 (A)   1.05 (A)    Sodium   140   143    Potassium   4.2   4.2    Chloride   107   109 (A)    Calcium   9.6   9.1    Albumin   4.80       Total " Bilirubin   0.4       Alkaline Phosphatase   125 (A)       AST (SGOT)   18       ALT (SGPT)   22       WBC     6.56     Hemoglobin     14.8     Hematocrit     45.3     Platelets     186     Total Cholesterol    164      Triglycerides    157 (A)      HDL Cholesterol    41      LDL Cholesterol     95      Hemoglobin A1C 6.80 (A)      7.10 (A)   Microalbumin, Urine  1.2        (A) Abnormal value                      Assessment and Plan   Diagnoses and all orders for this visit:    1. Anxiety (Primary)  -     ALPRAZolam (Xanax) 0.25 MG tablet; Take 1 tablet by mouth 3 (Three) Times a Day As Needed for Anxiety.  Dispense: 90 tablet; Refill: 0    2. Primary insomnia    3. Panic attacks      Insomnia trazodone and Topamax  Taken anxiety give Xanax 3 times a day we will recheck in 1 month with attempting to regular reduction in Xanax use       Follow Up   No follow-ups on file.  Patient was given instructions and counseling regarding her condition or for health maintenance advice. Please see specific information pulled into the AVS if appropriate.

## 2022-07-20 DIAGNOSIS — F41.9 ANXIETY: ICD-10-CM

## 2022-07-20 RX ORDER — ALPRAZOLAM 0.25 MG/1
0.25 TABLET ORAL 3 TIMES DAILY PRN
Qty: 90 TABLET | Refills: 0 | Status: SHIPPED | OUTPATIENT
Start: 2022-07-20 | End: 2022-09-20

## 2022-07-26 DIAGNOSIS — F41.9 ANXIETY: ICD-10-CM

## 2022-07-26 NOTE — TELEPHONE ENCOUNTER
Caller:     Relationship:     Best call back number: *  Marguerite Petersen (Self) [phone] (H)         Requested Prescriptions:   Requested Prescriptions     Pending Prescriptions Disp Refills   • DULoxetine (CYMBALTA) 60 MG capsule 180 capsule 2     Sig: Take 1 capsule by mouth 2 (Two) Times a Day.        Pharmacy where request should be sent: [facility] [address] [place] [address] [id number] [place] [address] [phone] [facility] [phone] FX     Additional details provided by patient:   PATIENT IS OUT OF MEDS AND PHARMACY DOES NOT HAVE RX       Does the patient have less than a 3 day supply:  [x] Yes  [] No    Phylicia Gordon   07/26/22 14:42 EDT

## 2022-07-27 RX ORDER — DULOXETIN HYDROCHLORIDE 60 MG/1
60 CAPSULE, DELAYED RELEASE ORAL 2 TIMES DAILY
Qty: 180 CAPSULE | Refills: 2
Start: 2022-07-27 | End: 2022-09-10 | Stop reason: SDUPTHER

## 2022-07-31 DIAGNOSIS — M75.02 ADHESIVE CAPSULITIS OF LEFT SHOULDER: ICD-10-CM

## 2022-08-01 RX ORDER — MELOXICAM 15 MG/1
TABLET ORAL
Qty: 30 TABLET | Refills: 0 | Status: SHIPPED | OUTPATIENT
Start: 2022-08-01 | End: 2022-08-31 | Stop reason: SDUPTHER

## 2022-08-01 RX ORDER — TIZANIDINE 2 MG/1
TABLET ORAL
Qty: 30 TABLET | Refills: 0 | Status: SHIPPED | OUTPATIENT
Start: 2022-08-01 | End: 2022-08-31 | Stop reason: SDUPTHER

## 2022-08-31 ENCOUNTER — OFFICE VISIT (OUTPATIENT)
Dept: INTERNAL MEDICINE | Facility: CLINIC | Age: 52
End: 2022-08-31

## 2022-08-31 VITALS
HEIGHT: 62 IN | BODY MASS INDEX: 26.68 KG/M2 | HEART RATE: 73 BPM | OXYGEN SATURATION: 99 % | DIASTOLIC BLOOD PRESSURE: 60 MMHG | WEIGHT: 145 LBS | SYSTOLIC BLOOD PRESSURE: 124 MMHG

## 2022-08-31 DIAGNOSIS — Z12.31 ENCOUNTER FOR SCREENING MAMMOGRAM FOR MALIGNANT NEOPLASM OF BREAST: ICD-10-CM

## 2022-08-31 DIAGNOSIS — Z79.4 TYPE 2 DIABETES MELLITUS WITH OTHER CIRCULATORY COMPLICATION, WITH LONG-TERM CURRENT USE OF INSULIN: ICD-10-CM

## 2022-08-31 DIAGNOSIS — E11.59 TYPE 2 DIABETES MELLITUS WITH OTHER CIRCULATORY COMPLICATION, WITH LONG-TERM CURRENT USE OF INSULIN: ICD-10-CM

## 2022-08-31 DIAGNOSIS — Z12.11 COLON CANCER SCREENING: ICD-10-CM

## 2022-08-31 DIAGNOSIS — F41.9 ANXIETY: Primary | ICD-10-CM

## 2022-08-31 DIAGNOSIS — M75.02 ADHESIVE CAPSULITIS OF LEFT SHOULDER: ICD-10-CM

## 2022-08-31 PROCEDURE — 99214 OFFICE O/P EST MOD 30 MIN: CPT | Performed by: FAMILY MEDICINE

## 2022-08-31 RX ORDER — TIZANIDINE 2 MG/1
2 TABLET ORAL NIGHTLY
Qty: 30 TABLET | Refills: 2 | Status: SHIPPED | OUTPATIENT
Start: 2022-08-31 | End: 2022-10-10 | Stop reason: SDUPTHER

## 2022-08-31 RX ORDER — CLONAZEPAM 0.5 MG/1
0.5 TABLET ORAL 2 TIMES DAILY PRN
Qty: 60 TABLET | Refills: 1 | Status: SHIPPED | OUTPATIENT
Start: 2022-08-31 | End: 2022-09-20

## 2022-08-31 RX ORDER — MELOXICAM 15 MG/1
TABLET ORAL
Qty: 30 TABLET | Refills: 2 | Status: SHIPPED | OUTPATIENT
Start: 2022-08-31 | End: 2022-12-05

## 2022-08-31 RX ORDER — INSULIN GLARGINE 100 [IU]/ML
INJECTION, SOLUTION SUBCUTANEOUS
COMMUNITY
Start: 2022-06-28 | End: 2022-08-31 | Stop reason: SDUPTHER

## 2022-08-31 RX ORDER — INSULIN GLARGINE 100 [IU]/ML
12 INJECTION, SOLUTION SUBCUTANEOUS DAILY
Qty: 5 PEN | Refills: 1 | Status: SHIPPED | OUTPATIENT
Start: 2022-08-31 | End: 2023-01-25 | Stop reason: SDUPTHER

## 2022-08-31 NOTE — PROGRESS NOTES
"Chief Complaint  follow up to anxiety    Subjective        Marguerite Petersen presents to [facility]  History of Present Illness  Patient follows up for ongoing management of chronic medical problems of anxiety left shoulder pain diabetes she does not check her blood sugars her anxiety is much improved she like to get off the Xanax and transition to longer acting medication she has been doing well with Cymbalta like to continue the same her sleep is stable  Does not have any recurrence of headaches  Improved she is going to beginning job at [employer] she is moving she is changing her name she is dealing well with the death of her mother  Objective   Vital Signs:  /60 (BP Location: Right arm, Patient Position: Sitting, Cuff Size: Adult)   Pulse 73   Ht 157.5 cm (62\")   Wt 65.8 kg (145 lb)   SpO2 99%   BMI 26.52 kg/m²   Estimated body mass index is 26.52 kg/m² as calculated from the following:    Height as of this encounter: 157.5 cm (62\").    Weight as of this encounter: 65.8 kg (145 lb).          Physical Exam  Vitals and nursing note reviewed.   Constitutional:       Appearance: Normal appearance.   HENT:      Head: Normocephalic and atraumatic.   Cardiovascular:      Pulses: Normal pulses.   Pulmonary:      Effort: Pulmonary effort is normal.   Skin:     General: Skin is warm and dry.   Neurological:      Mental Status: She is alert.   Psychiatric:         Mood and Affect: Mood normal.         Behavior: Behavior normal.         Thought Content: Thought content normal.         Judgment: Judgment normal.        Result Review :    Common labs    Common Labsle 3/23/22 3/23/22 3/23/22 3/23/22 4/28/22 4/28/22 6/1/22    0826 0826 0826 0826 0744 0744    Glucose   133 (A)   177 (A)    BUN   17   10    Creatinine   1.20 (A)   1.05 (A)    Sodium   140   143    Potassium   4.2   4.2    Chloride   107   109 (A)    Calcium   9.6   9.1    Albumin   4.80       Total Bilirubin   0.4     "   Alkaline Phosphatase   125 (A)       AST (SGOT)   18       ALT (SGPT)   22       WBC     6.56     Hemoglobin     14.8     Hematocrit     45.3     Platelets     186     Total Cholesterol    164      Triglycerides    157 (A)      HDL Cholesterol    41      LDL Cholesterol     95      Hemoglobin A1C 6.80 (A)      7.10 (A)   Microalbumin, Urine  1.2        (A) Abnormal value                      Assessment and Plan   Diagnoses and all orders for this visit:    1. Anxiety (Primary)  -     clonazePAM (KlonoPIN) 0.5 MG tablet; Take 1 tablet by mouth 2 (Two) Times a Day As Needed for Anxiety.  Dispense: 60 tablet; Refill: 1    2. Type 2 diabetes mellitus with other circulatory complication, with long-term current use of insulin (HCC)  -     Liraglutide (VICTOZA) 18 MG/3ML solution pen-injector injection; Inject 0.6 mg under the skin into the appropriate area as directed Daily.  Dispense: 3 pen; Refill: 3  -     Lantus SoloStar 100 UNIT/ML injection pen; Inject 12 Units under the skin into the appropriate area as directed Daily.  Dispense: 5 pen; Refill: 1    3. Adhesive capsulitis of left shoulder  -     meloxicam (MOBIC) 15 MG tablet; Take 1 tablet by mouth once daily with food  Dispense: 30 tablet; Refill: 2  -     tiZANidine (ZANAFLEX) 2 MG tablet; Take 1 tablet by mouth Every Night.  Dispense: 30 tablet; Refill: 2    4. Encounter for screening mammogram for malignant neoplasm of breast  -     Mammo Screening Bilateral With CAD; Future    5. Colon cancer screening  -     Ambulatory Referral For Screening Colonoscopy    Discontinue Xanax  Initiate clonazepam  Family history colon cancer in father  Continue monitoring blood sugars           Follow Up   Return in about 3 months (around 11/30/2022) for Recheck.  Patient was given instructions and counseling regarding her condition or for health maintenance advice. Please see specific information pulled into the AVS if appropriate.

## 2022-09-04 DIAGNOSIS — F51.01 PRIMARY INSOMNIA: ICD-10-CM

## 2022-09-06 RX ORDER — TRAZODONE HYDROCHLORIDE 100 MG/1
TABLET ORAL
Qty: 60 TABLET | Refills: 0 | Status: SHIPPED | OUTPATIENT
Start: 2022-09-06 | End: 2022-10-10 | Stop reason: SDUPTHER

## 2022-09-10 DIAGNOSIS — F41.9 ANXIETY: ICD-10-CM

## 2022-09-12 RX ORDER — DULOXETIN HYDROCHLORIDE 60 MG/1
60 CAPSULE, DELAYED RELEASE ORAL 2 TIMES DAILY
Qty: 180 CAPSULE | Refills: 0 | Status: SHIPPED | OUTPATIENT
Start: 2022-09-12 | End: 2022-10-26 | Stop reason: SDUPTHER

## 2022-09-20 DIAGNOSIS — F41.9 ANXIETY: ICD-10-CM

## 2022-09-20 RX ORDER — ALPRAZOLAM 0.25 MG/1
0.25 TABLET ORAL 3 TIMES DAILY PRN
Qty: 90 TABLET | Refills: 0 | Status: SHIPPED | OUTPATIENT
Start: 2022-09-20 | End: 2022-10-26 | Stop reason: SDUPTHER

## 2022-09-27 ENCOUNTER — TELEPHONE (OUTPATIENT)
Dept: INTERNAL MEDICINE | Facility: CLINIC | Age: 52
End: 2022-09-27

## 2022-09-28 DIAGNOSIS — R92.8 ABNORMALITY OF RIGHT BREAST ON SCREENING MAMMOGRAM: Primary | ICD-10-CM

## 2022-10-03 DIAGNOSIS — G43.C0 PERIODIC HEADACHE SYNDROME, NOT INTRACTABLE: ICD-10-CM

## 2022-10-10 DIAGNOSIS — M75.02 ADHESIVE CAPSULITIS OF LEFT SHOULDER: ICD-10-CM

## 2022-10-10 DIAGNOSIS — F51.01 PRIMARY INSOMNIA: ICD-10-CM

## 2022-10-11 RX ORDER — TIZANIDINE 2 MG/1
2 TABLET ORAL NIGHTLY
Qty: 30 TABLET | Refills: 2 | Status: SHIPPED | OUTPATIENT
Start: 2022-10-11 | End: 2023-01-25 | Stop reason: SDUPTHER

## 2022-10-11 RX ORDER — TRAZODONE HYDROCHLORIDE 100 MG/1
200 TABLET ORAL NIGHTLY
Qty: 60 TABLET | Refills: 0 | Status: SHIPPED | OUTPATIENT
Start: 2022-10-11 | End: 2022-11-28

## 2022-10-26 ENCOUNTER — LAB (OUTPATIENT)
Dept: LAB | Facility: HOSPITAL | Age: 52
End: 2022-10-26

## 2022-10-26 ENCOUNTER — OFFICE VISIT (OUTPATIENT)
Dept: INTERNAL MEDICINE | Facility: CLINIC | Age: 52
End: 2022-10-26

## 2022-10-26 VITALS
SYSTOLIC BLOOD PRESSURE: 116 MMHG | WEIGHT: 147.8 LBS | BODY MASS INDEX: 27.2 KG/M2 | HEIGHT: 62 IN | HEART RATE: 80 BPM | DIASTOLIC BLOOD PRESSURE: 60 MMHG | OXYGEN SATURATION: 99 %

## 2022-10-26 DIAGNOSIS — F51.01 PRIMARY INSOMNIA: ICD-10-CM

## 2022-10-26 DIAGNOSIS — E11.59 TYPE 2 DIABETES MELLITUS WITH OTHER CIRCULATORY COMPLICATION, WITH LONG-TERM CURRENT USE OF INSULIN: ICD-10-CM

## 2022-10-26 DIAGNOSIS — Z79.4 TYPE 2 DIABETES MELLITUS WITH OTHER CIRCULATORY COMPLICATION, WITH LONG-TERM CURRENT USE OF INSULIN: ICD-10-CM

## 2022-10-26 DIAGNOSIS — Z51.81 MEDICATION MONITORING ENCOUNTER: ICD-10-CM

## 2022-10-26 DIAGNOSIS — F41.9 ANXIETY: Primary | ICD-10-CM

## 2022-10-26 PROCEDURE — 80307 DRUG TEST PRSMV CHEM ANLYZR: CPT | Performed by: FAMILY MEDICINE

## 2022-10-26 PROCEDURE — G0481 DRUG TEST DEF 8-14 CLASSES: HCPCS | Performed by: FAMILY MEDICINE

## 2022-10-26 PROCEDURE — 99214 OFFICE O/P EST MOD 30 MIN: CPT | Performed by: FAMILY MEDICINE

## 2022-10-26 RX ORDER — ALPRAZOLAM 0.25 MG/1
0.25 TABLET ORAL 3 TIMES DAILY PRN
Qty: 90 TABLET | Refills: 2 | Status: SHIPPED | OUTPATIENT
Start: 2022-10-26 | End: 2023-01-25 | Stop reason: SDUPTHER

## 2022-10-26 RX ORDER — DULOXETIN HYDROCHLORIDE 30 MG/1
30 CAPSULE, DELAYED RELEASE ORAL DAILY
Qty: 90 CAPSULE | Refills: 1 | Status: SHIPPED | OUTPATIENT
Start: 2022-10-26 | End: 2023-03-01

## 2022-10-26 RX ORDER — DULOXETIN HYDROCHLORIDE 60 MG/1
60 CAPSULE, DELAYED RELEASE ORAL DAILY
Qty: 90 CAPSULE | Refills: 1 | Status: SHIPPED | OUTPATIENT
Start: 2022-10-26 | End: 2023-01-25 | Stop reason: SDUPTHER

## 2022-10-26 NOTE — PROGRESS NOTES
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  She feels presently stable and doing well with insomnia as long as she does take the trazodone  No unwanted side effects of medications and no elevated blood sugar readings  Objective   Vital Signs:  /60 (BP Location: Left arm, Patient Position: Sitting, Cuff Size: Adult)   Pulse 80   Ht 157.5 cm (62\")   Wt 67 kg (147 lb 12.8 oz)   SpO2 99%   BMI 27.03 kg/m²   Estimated body mass index is 27.03 kg/m² as calculated from the following:    Height as of this encounter: 157.5 cm (62\").    Weight as of this encounter: 67 kg (147 lb 12.8 oz).          Physical Exam  Vitals and nursing note reviewed.   Constitutional:       Appearance: Normal appearance.   Cardiovascular:      Rate and Rhythm: Normal rate and regular rhythm.      Pulses: Normal pulses.      Heart sounds: Normal heart sounds.   Pulmonary:      Effort: Pulmonary effort is normal.      Breath sounds: Normal breath sounds.   Musculoskeletal:      Right lower leg: No edema.      Left lower leg: No edema.   Neurological:      Mental Status: She is alert.   Psychiatric:         Mood and Affect: Mood normal.         Behavior: Behavior normal.         Thought Content: Thought content normal.         Judgment: Judgment normal.        Result Review :    Common labs    Common Labs 3/23/22 3/23/22 3/23/22 3/23/22 4/28/22 4/28/22 6/1/22    0826 0826 0826 0826 [id number] [id number]    Glucose   133 (A)   177 (A)    BUN   17   10    Creatinine   1.20 (A)   1.05 (A)    Sodium   140   143    Potassium   4.2   4.2    Chloride   107   109 (A)    Calcium   9.6   9.1    Albumin   4.80       Total Bilirubin   0.4       Alkaline Phosphatase   125 (A)       AST (SGOT)   18       ALT " (SGPT)   22       WBC     6.56     Hemoglobin     14.8     Hematocrit     45.3     Platelets     186     Total Cholesterol    164      Triglycerides    157 (A)      HDL Cholesterol    41      LDL Cholesterol     95      Hemoglobin A1C 6.80 (A)      7.10 (A)   Microalbumin, Urine  1.2        (A) Abnormal value            Data reviewed: Radiologic studies September 2022 ultrasound right breast normal recommend follow-up mammogram 6 months          Assessment and Plan   Diagnoses and all orders for this visit:    1. Anxiety (Primary)  -     DULoxetine (CYMBALTA) 60 MG capsule; Take 1 capsule by mouth Daily.  Dispense: 90 capsule; Refill: 1  -     DULoxetine (CYMBALTA) 30 MG capsule; Take 1 capsule by mouth Daily.  Dispense: 90 capsule; Refill: 1  -     ALPRAZolam (XANAX) 0.25 MG tablet; Take 1 tablet by mouth 3 (Three) Times a Day As Needed for Anxiety.  Dispense: 90 tablet; Refill: 2    2. Medication monitoring encounter  -     ToxASSURE Select 13 Discrete -    3. Type 2 diabetes mellitus with other circulatory complication, with long-term current use of insulin ([facility])    4. Primary insomnia    Insomnia continue trazodone and Topamax  Diabetes continue Lantus 12 units, Victoza 0.6 mg         Follow Up   Return in about 3 months (around 1/26/2023), or if symptoms worsen or fail to improve, for Recheck.  Patient was given instructions and counseling regarding her condition or for health maintenance advice. Please see specific information pulled into the AVS if appropriate.

## 2022-11-02 LAB
6MAM UR QL CFM: NEGATIVE
6MAM/CREAT UR: NOT DETECTED NG/MG CREAT
7AMINOCLONAZEPAM/CREAT UR: NOT DETECTED NG/MG CREAT
A-OH ALPRAZ/CREAT UR: 159 NG/MG CREAT
A-OH-TRIAZOLAM/CREAT UR CFM: NOT DETECTED NG/MG CREAT
ALFENTANIL/CREAT UR CFM: NOT DETECTED NG/MG CREAT
ALPHA-HYDROXYMIDAZOLAM, URINE: NOT DETECTED NG/MG CREAT
ALPRAZ/CREAT UR CFM: 42 NG/MG CREAT
AMOBARBITAL UR QL CFM: NOT DETECTED
AMPHET/CREAT UR: NOT DETECTED NG/MG CREAT
AMPHETAMINES UR QL CFM: NEGATIVE
BARBITAL UR QL CFM: NOT DETECTED
BARBITURATES UR QL CFM: NEGATIVE
BENZODIAZ UR QL CFM: NORMAL
BUPRENORPHINE UR QL CFM: NEGATIVE
BUPRENORPHINE/CREAT UR: NOT DETECTED NG/MG CREAT
BUTABARBITAL UR QL CFM: NOT DETECTED
BUTALBITAL UR QL CFM: NOT DETECTED
BZE/CREAT UR: NOT DETECTED NG/MG CREAT
CANNABINOIDS UR QL CFM: NEGATIVE
CARBOXYTHC/CREAT UR: NOT DETECTED NG/MG CREAT
CLONAZEPAM/CREAT UR CFM: NOT DETECTED NG/MG CREAT
COCAETHYLENE/CREAT UR CFM: NOT DETECTED NG/MG CREAT
COCAINE UR QL CFM: NEGATIVE
COCAINE/CREAT UR CFM: NOT DETECTED NG/MG CREAT
CODEINE/CREAT UR: NOT DETECTED NG/MG CREAT
CREAT UR-MCNC: 345 MG/DL
DESALKYLFLURAZ/CREAT UR: NOT DETECTED NG/MG CREAT
DESMETHYLFLUNITRAZEPAM: NOT DETECTED NG/MG CREAT
DHC/CREAT UR: NOT DETECTED NG/MG CREAT
DIAZEPAM/CREAT UR: NOT DETECTED NG/MG CREAT
DRUGS UR: NORMAL
EDDP/CREAT UR: NOT DETECTED NG/MG CREAT
ETHANOL UR CFM-MCNC: NOT DETECTED G/DL
ETHANOL UR QL CFM: NEGATIVE
FENTANYL UR QL CFM: NEGATIVE
FENTANYL/CREAT UR: NOT DETECTED NG/MG CREAT
FLUNITRAZEPAM UR QL CFM: NOT DETECTED NG/MG CREAT
HYDROCODONE/CREAT UR: NOT DETECTED NG/MG CREAT
HYDROMORPHONE/CREAT UR: NOT DETECTED NG/MG CREAT
LORAZEPAM/CREAT UR: NOT DETECTED NG/MG CREAT
MDA/CREAT UR: NOT DETECTED NG/MG CREAT
MDMA/CREAT UR: NOT DETECTED NG/MG CREAT
MEPHOBARBITAL UR QL CFM: NOT DETECTED
METHADONE UR QL CFM: NEGATIVE
METHADONE/CREAT UR: NOT DETECTED NG/MG CREAT
METHAMPHET/CREAT UR: NOT DETECTED NG/MG CREAT
MIDAZOLAM/CREAT UR CFM: NOT DETECTED NG/MG CREAT
MORPHINE/CREAT UR: NOT DETECTED NG/MG CREAT
N-NORTRAMADOL/CREAT UR CFM: NOT DETECTED NG/MG CREAT
NARCOTICS UR: NEGATIVE
NORBUPRENORPHINE/CREAT UR: NOT DETECTED NG/MG CREAT
NORCODEINE/CREAT UR CFM: NOT DETECTED NG/MG CREAT
NORDIAZEPAM/CREAT UR: NOT DETECTED NG/MG CREAT
NORFENTANYL/CREAT UR: NOT DETECTED NG/MG CREAT
NORHYDROCODONE/CREAT UR: NOT DETECTED NG/MG CREAT
NORMORPHINE UR-MCNC: NOT DETECTED NG/MG CREAT
NOROXYCODONE/CREAT UR: NOT DETECTED NG/MG CREAT
NOROXYMORPHONE/CREAT UR CFM: NOT DETECTED NG/MG CREAT
O-NORTRAMADOL UR CFM-MCNC: NOT DETECTED NG/MG CREAT
OPIATES UR QL CFM: NEGATIVE
OXAZEPAM/CREAT UR: NOT DETECTED NG/MG CREAT
OXYCODONE UR QL CFM: NEGATIVE
OXYCODONE/CREAT UR: NOT DETECTED NG/MG CREAT
OXYMORPHONE/CREAT UR: NOT DETECTED NG/MG CREAT
PENTOBARB UR QL CFM: NOT DETECTED
PHENOBARB UR QL CFM: NOT DETECTED
SECOBARBITAL UR QL CFM: NOT DETECTED
SERVICE CMNT 02-IMP: NORMAL
SUFENTANIL/CREAT UR CFM: NOT DETECTED NG/MG CREAT
TAPENTADOL UR QL CFM: NEGATIVE
TAPENTADOL/CREAT UR: NOT DETECTED NG/MG CREAT
TEMAZEPAM/CREAT UR: NOT DETECTED NG/MG CREAT
THIOPENTAL UR QL CFM: NOT DETECTED
TRAMADOL UR QL CFM: NOT DETECTED NG/MG CREAT

## 2022-11-25 DIAGNOSIS — F51.01 PRIMARY INSOMNIA: ICD-10-CM

## 2022-11-28 RX ORDER — TRAZODONE HYDROCHLORIDE 100 MG/1
TABLET ORAL
Qty: 60 TABLET | Refills: 0 | Status: SHIPPED | OUTPATIENT
Start: 2022-11-28 | End: 2022-12-19

## 2022-12-05 DIAGNOSIS — M75.02 ADHESIVE CAPSULITIS OF LEFT SHOULDER: ICD-10-CM

## 2022-12-05 RX ORDER — MELOXICAM 15 MG/1
TABLET ORAL
Qty: 30 TABLET | Refills: 0 | Status: SHIPPED | OUTPATIENT
Start: 2022-12-05 | End: 2023-01-04

## 2022-12-19 DIAGNOSIS — F51.01 PRIMARY INSOMNIA: ICD-10-CM

## 2022-12-19 DIAGNOSIS — K21.00 GASTROESOPHAGEAL REFLUX DISEASE WITH ESOPHAGITIS WITHOUT HEMORRHAGE: ICD-10-CM

## 2022-12-19 RX ORDER — TRAZODONE HYDROCHLORIDE 100 MG/1
TABLET ORAL
Qty: 60 TABLET | Refills: 0 | Status: SHIPPED | OUTPATIENT
Start: 2022-12-19 | End: 2023-01-25

## 2022-12-19 RX ORDER — OMEPRAZOLE 20 MG/1
CAPSULE, DELAYED RELEASE ORAL
Qty: 90 CAPSULE | Refills: 0 | Status: SHIPPED | OUTPATIENT
Start: 2022-12-19 | End: 2023-01-25

## 2023-01-04 DIAGNOSIS — M75.02 ADHESIVE CAPSULITIS OF LEFT SHOULDER: ICD-10-CM

## 2023-01-04 DIAGNOSIS — E78.2 MIXED HYPERLIPIDEMIA: ICD-10-CM

## 2023-01-04 DIAGNOSIS — G43.C0 PERIODIC HEADACHE SYNDROME, NOT INTRACTABLE: ICD-10-CM

## 2023-01-04 DIAGNOSIS — Z79.4 TYPE 2 DIABETES MELLITUS WITH OTHER CIRCULATORY COMPLICATION, WITH LONG-TERM CURRENT USE OF INSULIN: ICD-10-CM

## 2023-01-04 DIAGNOSIS — E11.59 TYPE 2 DIABETES MELLITUS WITH OTHER CIRCULATORY COMPLICATION, WITH LONG-TERM CURRENT USE OF INSULIN: ICD-10-CM

## 2023-01-04 RX ORDER — MELOXICAM 15 MG/1
TABLET ORAL
Qty: 30 TABLET | Refills: 0 | Status: SHIPPED | OUTPATIENT
Start: 2023-01-04 | End: 2023-01-25

## 2023-01-04 RX ORDER — PRAVASTATIN SODIUM 20 MG
TABLET ORAL
Qty: 90 TABLET | Refills: 0 | Status: SHIPPED | OUTPATIENT
Start: 2023-01-04 | End: 2023-01-25

## 2023-01-20 DIAGNOSIS — M75.02 ADHESIVE CAPSULITIS OF LEFT SHOULDER: ICD-10-CM

## 2023-01-20 DIAGNOSIS — E78.2 MIXED HYPERLIPIDEMIA: ICD-10-CM

## 2023-01-20 DIAGNOSIS — E11.59 TYPE 2 DIABETES MELLITUS WITH OTHER CIRCULATORY COMPLICATION, WITH LONG-TERM CURRENT USE OF INSULIN: ICD-10-CM

## 2023-01-20 DIAGNOSIS — K21.00 GASTROESOPHAGEAL REFLUX DISEASE WITH ESOPHAGITIS WITHOUT HEMORRHAGE: ICD-10-CM

## 2023-01-20 DIAGNOSIS — Z79.4 TYPE 2 DIABETES MELLITUS WITH OTHER CIRCULATORY COMPLICATION, WITH LONG-TERM CURRENT USE OF INSULIN: ICD-10-CM

## 2023-01-20 DIAGNOSIS — F51.01 PRIMARY INSOMNIA: ICD-10-CM

## 2023-01-20 DIAGNOSIS — G43.C0 PERIODIC HEADACHE SYNDROME, NOT INTRACTABLE: ICD-10-CM

## 2023-01-25 DIAGNOSIS — G43.C0 PERIODIC HEADACHE SYNDROME, NOT INTRACTABLE: ICD-10-CM

## 2023-01-25 DIAGNOSIS — E11.59 TYPE 2 DIABETES MELLITUS WITH OTHER CIRCULATORY COMPLICATION, WITH LONG-TERM CURRENT USE OF INSULIN: ICD-10-CM

## 2023-01-25 DIAGNOSIS — F51.01 PRIMARY INSOMNIA: ICD-10-CM

## 2023-01-25 DIAGNOSIS — M75.02 ADHESIVE CAPSULITIS OF LEFT SHOULDER: ICD-10-CM

## 2023-01-25 DIAGNOSIS — E78.2 MIXED HYPERLIPIDEMIA: ICD-10-CM

## 2023-01-25 DIAGNOSIS — F41.9 ANXIETY: ICD-10-CM

## 2023-01-25 DIAGNOSIS — K21.00 GASTROESOPHAGEAL REFLUX DISEASE WITH ESOPHAGITIS WITHOUT HEMORRHAGE: ICD-10-CM

## 2023-01-25 DIAGNOSIS — Z79.4 TYPE 2 DIABETES MELLITUS WITH OTHER CIRCULATORY COMPLICATION, WITH LONG-TERM CURRENT USE OF INSULIN: ICD-10-CM

## 2023-01-25 RX ORDER — OMEPRAZOLE 20 MG/1
CAPSULE, DELAYED RELEASE ORAL
Qty: 30 CAPSULE | Refills: 0 | Status: SHIPPED | OUTPATIENT
Start: 2023-01-25 | End: 2023-02-27 | Stop reason: SDUPTHER

## 2023-01-25 RX ORDER — TRAZODONE HYDROCHLORIDE 100 MG/1
200 TABLET ORAL NIGHTLY
Qty: 60 TABLET | Refills: 0 | OUTPATIENT
Start: 2023-01-25

## 2023-01-25 RX ORDER — TRAZODONE HYDROCHLORIDE 100 MG/1
TABLET ORAL
Qty: 60 TABLET | Refills: 0 | Status: SHIPPED | OUTPATIENT
Start: 2023-01-25 | End: 2023-02-27 | Stop reason: SDUPTHER

## 2023-01-25 RX ORDER — MELOXICAM 15 MG/1
15 TABLET ORAL DAILY
Qty: 30 TABLET | Refills: 0 | OUTPATIENT
Start: 2023-01-25

## 2023-01-25 RX ORDER — OMEPRAZOLE 20 MG/1
20 CAPSULE, DELAYED RELEASE ORAL DAILY
Qty: 90 CAPSULE | Refills: 0 | OUTPATIENT
Start: 2023-01-25

## 2023-01-25 RX ORDER — INSULIN GLARGINE 100 [IU]/ML
12 INJECTION, SOLUTION SUBCUTANEOUS DAILY
Qty: 15 ML | Refills: 0 | Status: SHIPPED | OUTPATIENT
Start: 2023-01-25

## 2023-01-25 RX ORDER — ALPRAZOLAM 0.25 MG/1
0.25 TABLET ORAL 3 TIMES DAILY PRN
Qty: 90 TABLET | Refills: 0 | Status: SHIPPED | OUTPATIENT
Start: 2023-01-25 | End: 2023-02-27 | Stop reason: SDUPTHER

## 2023-01-25 RX ORDER — TIZANIDINE 2 MG/1
2 TABLET ORAL NIGHTLY
Qty: 30 TABLET | Refills: 2 | Status: SHIPPED | OUTPATIENT
Start: 2023-01-25 | End: 2023-02-27 | Stop reason: SDUPTHER

## 2023-01-25 RX ORDER — MELOXICAM 15 MG/1
TABLET ORAL
Qty: 30 TABLET | Refills: 0 | Status: SHIPPED | OUTPATIENT
Start: 2023-01-25 | End: 2023-02-27 | Stop reason: SDUPTHER

## 2023-01-25 RX ORDER — METOPROLOL TARTRATE 50 MG/1
50 TABLET, FILM COATED ORAL 2 TIMES DAILY
Qty: 60 TABLET | Refills: 0 | Status: SHIPPED | OUTPATIENT
Start: 2023-01-25 | End: 2023-02-27 | Stop reason: SDUPTHER

## 2023-01-25 RX ORDER — DULOXETIN HYDROCHLORIDE 60 MG/1
60 CAPSULE, DELAYED RELEASE ORAL DAILY
Qty: 90 CAPSULE | Refills: 1 | Status: SHIPPED | OUTPATIENT
Start: 2023-01-25 | End: 2023-02-27 | Stop reason: SDUPTHER

## 2023-01-25 RX ORDER — PRAVASTATIN SODIUM 20 MG
20 TABLET ORAL EVERY EVENING
Qty: 90 TABLET | Refills: 0 | OUTPATIENT
Start: 2023-01-25

## 2023-01-25 RX ORDER — PRAVASTATIN SODIUM 20 MG
20 TABLET ORAL EVERY EVENING
Qty: 30 TABLET | Refills: 0 | Status: SHIPPED | OUTPATIENT
Start: 2023-01-25 | End: 2023-02-27 | Stop reason: SDUPTHER

## 2023-02-27 DIAGNOSIS — E78.2 MIXED HYPERLIPIDEMIA: ICD-10-CM

## 2023-02-27 DIAGNOSIS — G43.C0 PERIODIC HEADACHE SYNDROME, NOT INTRACTABLE: ICD-10-CM

## 2023-02-27 DIAGNOSIS — Z79.4 TYPE 2 DIABETES MELLITUS WITH OTHER CIRCULATORY COMPLICATION, WITH LONG-TERM CURRENT USE OF INSULIN: ICD-10-CM

## 2023-02-27 DIAGNOSIS — F41.9 ANXIETY: ICD-10-CM

## 2023-02-27 DIAGNOSIS — E11.59 TYPE 2 DIABETES MELLITUS WITH OTHER CIRCULATORY COMPLICATION, WITH LONG-TERM CURRENT USE OF INSULIN: ICD-10-CM

## 2023-02-27 DIAGNOSIS — M75.02 ADHESIVE CAPSULITIS OF LEFT SHOULDER: ICD-10-CM

## 2023-02-27 DIAGNOSIS — K21.00 GASTROESOPHAGEAL REFLUX DISEASE WITH ESOPHAGITIS WITHOUT HEMORRHAGE: ICD-10-CM

## 2023-02-27 DIAGNOSIS — F51.01 PRIMARY INSOMNIA: ICD-10-CM

## 2023-03-01 RX ORDER — OMEPRAZOLE 20 MG/1
20 CAPSULE, DELAYED RELEASE ORAL DAILY
Qty: 30 CAPSULE | Refills: 0 | Status: SHIPPED | OUTPATIENT
Start: 2023-03-01

## 2023-03-01 RX ORDER — MELOXICAM 15 MG/1
15 TABLET ORAL DAILY
Qty: 30 TABLET | Refills: 0 | Status: SHIPPED | OUTPATIENT
Start: 2023-03-01

## 2023-03-01 RX ORDER — METOPROLOL TARTRATE 50 MG/1
50 TABLET, FILM COATED ORAL 2 TIMES DAILY
Qty: 60 TABLET | Refills: 0 | Status: SHIPPED | OUTPATIENT
Start: 2023-03-01

## 2023-03-01 RX ORDER — PRAVASTATIN SODIUM 20 MG
20 TABLET ORAL EVERY EVENING
Qty: 30 TABLET | Refills: 0 | Status: SHIPPED | OUTPATIENT
Start: 2023-03-01

## 2023-03-01 RX ORDER — DULOXETIN HYDROCHLORIDE 60 MG/1
60 CAPSULE, DELAYED RELEASE ORAL DAILY
Qty: 30 CAPSULE | Refills: 0 | Status: SHIPPED | OUTPATIENT
Start: 2023-03-01

## 2023-03-01 RX ORDER — ALPRAZOLAM 0.25 MG/1
0.25 TABLET ORAL 2 TIMES DAILY
Qty: 90 TABLET | Refills: 0 | Status: SHIPPED | OUTPATIENT
Start: 2023-03-01

## 2023-03-01 RX ORDER — TRAZODONE HYDROCHLORIDE 100 MG/1
200 TABLET ORAL NIGHTLY
Qty: 60 TABLET | Refills: 0 | Status: SHIPPED | OUTPATIENT
Start: 2023-03-01

## 2023-03-01 RX ORDER — TIZANIDINE 2 MG/1
2 TABLET ORAL NIGHTLY
Qty: 30 TABLET | Refills: 0 | Status: SHIPPED | OUTPATIENT
Start: 2023-03-01

## (undated) DEVICE — GLV SURG BIOGEL LTX PF 6 1/2

## (undated) DEVICE — SKIN PREP TRAY W/CHG: Brand: MEDLINE INDUSTRIES, INC.

## (undated) DEVICE — DRAPE,SHOULDER,BEACH ULTRAGARD: Brand: MEDLINE

## (undated) DEVICE — KT POSTN ARM TRIMANO BEACH CHR W/DRP

## (undated) DEVICE — PK ARTHSCP SHLDR TOWER 40

## (undated) DEVICE — NDL HYPO ECLPS SFTY 22G 1 1/2IN

## (undated) DEVICE — ARM SLING: Brand: DEROYAL

## (undated) DEVICE — PREP SOL DYNA-HEX CHG LIQ 4% BT 4OZ

## (undated) DEVICE — DRSNG WND GZ CURAD OIL EMULSION 3X3IN STRL

## (undated) DEVICE — LOU CYSTO: Brand: MEDLINE INDUSTRIES, INC.

## (undated) DEVICE — PATIENT RETURN ELECTRODE, SINGLE-USE, CONTACT QUALITY MONITORING, ADULT, WITH 9FT CORD, FOR PATIENTS WEIGING OVER 33LBS. (15KG): Brand: MEGADYNE

## (undated) DEVICE — TBG ARTHSCP PT W CONN/REDUC 8FT

## (undated) DEVICE — SYR LUERLOK 5CC

## (undated) DEVICE — GLV SURG BIOGEL LTX PF 7